# Patient Record
Sex: FEMALE | Race: BLACK OR AFRICAN AMERICAN | NOT HISPANIC OR LATINO | Employment: FULL TIME | URBAN - METROPOLITAN AREA
[De-identification: names, ages, dates, MRNs, and addresses within clinical notes are randomized per-mention and may not be internally consistent; named-entity substitution may affect disease eponyms.]

---

## 2018-03-27 ENCOUNTER — HISTORICAL (OUTPATIENT)
Dept: URGENT CARE | Facility: CLINIC | Age: 23
End: 2018-03-27

## 2018-03-27 LAB
BILIRUB SERPL-MCNC: NEGATIVE MG/DL
BLOOD URINE, POC: NEGATIVE
CLARITY, POC UA: NORMAL
COLOR, POC UA: YELLOW
GLUCOSE UR QL STRIP: NEGATIVE
KETONES UR QL STRIP: NEGATIVE
LEUKOCYTE EST, POC UA: NORMAL
NITRITE, POC UA: NEGATIVE
PH, POC UA: 7
PROTEIN, POC: NORMAL
SPECIFIC GRAVITY, POC UA: 1.01
UROBILINOGEN, POC UA: NORMAL

## 2019-04-29 LAB
INFLUENZA A ANTIGEN, POC: NEGATIVE
INFLUENZA B ANTIGEN, POC: NEGATIVE
RAPID GROUP A STREP (OHS): NEGATIVE

## 2019-10-11 ENCOUNTER — HISTORICAL (OUTPATIENT)
Dept: URGENT CARE | Facility: CLINIC | Age: 24
End: 2019-10-11

## 2019-10-11 LAB
ABS NEUT (OLG): 4.61 X10(3)/MCL (ref 2.1–9.2)
ALBUMIN SERPL-MCNC: 4.4 GM/DL (ref 3.4–5)
ALBUMIN/GLOB SERPL: 1.3 RATIO (ref 1.1–2)
ALP SERPL-CCNC: 56 UNIT/L (ref 38–126)
ALT SERPL-CCNC: 13 UNIT/L (ref 12–78)
AST SERPL-CCNC: 10 UNIT/L (ref 15–37)
BASOPHILS # BLD AUTO: 0 X10(3)/MCL (ref 0–0.2)
BASOPHILS NFR BLD AUTO: 0 %
BILIRUB SERPL-MCNC: 0.5 MG/DL (ref 0.2–1)
BILIRUBIN DIRECT+TOT PNL SERPL-MCNC: 0.1 MG/DL (ref 0–0.5)
BILIRUBIN DIRECT+TOT PNL SERPL-MCNC: 0.4 MG/DL (ref 0–0.8)
BUN SERPL-MCNC: 7 MG/DL (ref 7–18)
CALCIUM SERPL-MCNC: 9.3 MG/DL (ref 8.5–10.1)
CHLORIDE SERPL-SCNC: 107 MMOL/L (ref 98–107)
CO2 SERPL-SCNC: 27 MMOL/L (ref 21–32)
CREAT SERPL-MCNC: 0.81 MG/DL (ref 0.55–1.02)
EOSINOPHIL # BLD AUTO: 0 X10(3)/MCL (ref 0–0.9)
EOSINOPHIL NFR BLD AUTO: 1 %
ERYTHROCYTE [DISTWIDTH] IN BLOOD BY AUTOMATED COUNT: 12.3 % (ref 11.5–17)
GLOBULIN SER-MCNC: 3.3 GM/DL (ref 2.4–3.5)
GLUCOSE SERPL-MCNC: 82 MG/DL (ref 74–106)
HCT VFR BLD AUTO: 42.5 % (ref 37–47)
HGB BLD-MCNC: 13.3 GM/DL (ref 12–16)
INFLUENZA A ANTIGEN, POC: NEGATIVE
INFLUENZA B ANTIGEN, POC: NEGATIVE
LYMPHOCYTES # BLD AUTO: 2.1 X10(3)/MCL (ref 0.6–4.6)
LYMPHOCYTES NFR BLD AUTO: 28 %
MCH RBC QN AUTO: 29.4 PG (ref 27–31)
MCHC RBC AUTO-ENTMCNC: 31.3 GM/DL (ref 33–36)
MCV RBC AUTO: 94 FL (ref 80–94)
MONOCYTES # BLD AUTO: 0.7 X10(3)/MCL (ref 0.1–1.3)
MONOCYTES NFR BLD AUTO: 10 %
NEUTROPHILS # BLD AUTO: 4.61 X10(3)/MCL (ref 2.1–9.2)
NEUTROPHILS NFR BLD AUTO: 61 %
PLATELET # BLD AUTO: 231 X10(3)/MCL (ref 130–400)
PMV BLD AUTO: 11.9 FL (ref 9.4–12.4)
POTASSIUM SERPL-SCNC: 4 MMOL/L (ref 3.5–5.1)
PROT SERPL-MCNC: 7.7 GM/DL (ref 6.4–8.2)
RBC # BLD AUTO: 4.52 X10(6)/MCL (ref 4.2–5.4)
SODIUM SERPL-SCNC: 142 MMOL/L (ref 136–145)
TSH SERPL-ACNC: 1.5 MIU/L (ref 0.36–3.74)
WBC # SPEC AUTO: 7.6 X10(3)/MCL (ref 4.5–11.5)

## 2019-12-02 ENCOUNTER — HISTORICAL (OUTPATIENT)
Dept: ADMINISTRATIVE | Facility: HOSPITAL | Age: 24
End: 2019-12-02

## 2019-12-02 ENCOUNTER — HISTORICAL (OUTPATIENT)
Dept: URGENT CARE | Facility: CLINIC | Age: 24
End: 2019-12-02

## 2019-12-02 LAB
BILIRUB SERPL-MCNC: NEGATIVE MG/DL
BLOOD URINE, POC: NEGATIVE
CLARITY, POC UA: NORMAL
COLOR, POC UA: YELLOW
GLUCOSE UR QL STRIP: NEGATIVE
INFLUENZA A ANTIGEN, POC: NEGATIVE
INFLUENZA B ANTIGEN, POC: NEGATIVE
KETONES UR QL STRIP: NORMAL
LEUKOCYTE EST, POC UA: NEGATIVE
NITRITE, POC UA: POSITIVE
PH, POC UA: 6
PROTEIN, POC: NORMAL
SPECIFIC GRAVITY, POC UA: 1.02
UROBILINOGEN, POC UA: NORMAL

## 2020-03-29 ENCOUNTER — HISTORICAL (OUTPATIENT)
Dept: ADMINISTRATIVE | Facility: HOSPITAL | Age: 25
End: 2020-03-29

## 2020-03-29 LAB
BILIRUB SERPL-MCNC: NEGATIVE MG/DL
BLOOD URINE, POC: NEGATIVE
CLARITY, POC UA: CLEAR
COLOR, POC UA: YELLOW
GLUCOSE UR QL STRIP: NEGATIVE
KETONES UR QL STRIP: NORMAL
LEUKOCYTE EST, POC UA: NEGATIVE
NITRITE, POC UA: NEGATIVE
PH, POC UA: 6.5
PROTEIN, POC: NEGATIVE
SPECIFIC GRAVITY, POC UA: 1.02
UROBILINOGEN, POC UA: NORMAL

## 2020-05-08 ENCOUNTER — HISTORICAL (OUTPATIENT)
Dept: RADIOLOGY | Facility: HOSPITAL | Age: 25
End: 2020-05-08

## 2022-04-10 ENCOUNTER — HISTORICAL (OUTPATIENT)
Dept: ADMINISTRATIVE | Facility: HOSPITAL | Age: 27
End: 2022-04-10
Payer: COMMERCIAL

## 2022-04-27 VITALS
OXYGEN SATURATION: 100 % | DIASTOLIC BLOOD PRESSURE: 69 MMHG | SYSTOLIC BLOOD PRESSURE: 105 MMHG | BODY MASS INDEX: 22.22 KG/M2 | WEIGHT: 141.56 LBS | HEIGHT: 67 IN

## 2022-05-03 NOTE — HISTORICAL [facility]
This is a historical note converted from Jenny. Formatting and pictures may have been removed.  Please reference Jenny for original formatting and attached multimedia. Chief Complaint  mid right abdominal pain, nausea, vomiting for 2 days (reoccuring issue)  History of Present Illness  24-year-old female presents for concern of abdominal pain x 2 days, mainly R sided with associated nausea and vomiting.? Vomiting x 4 yesterday.? Came with pain and without the pain.? No fever, no radiation of the pain.? Described as a crampy and sharp sensation.? Comes and goes.? Sometimes feels it in the L flank.? Some darkening of the urine noted over the last couple of days. No hematuria.?  LMP 3/24 (5 days prior), no blood clots, denies possibility of pregnancy.  Review of Systems  Constitutional_negative for fever  ENMT_negative for rhinorrhea, sinus pressure, sore throat,?blurry vision or change in vision  Respiratory_negative for?cough  Gastrointestinal_negative for nausea or vomiting  Integumentary_negative for rash  Physical Exam  Vitals & Measurements  T:?36.7? ?C (Oral)? HR:?67(Peripheral)? RR:?17? BP:?144/79? SpO2:?100%?  HT:?170?cm? WT:?60?kg? BMI:?20.76? LMP:?03/24/2020 00:00 CDT?  Gen: WD NAD  CV: S1S2 RRR no MRG  Abd: ND, mild tenderness to deep palpation of epigastrium, no organomegaly, normoactive BS  Resp: CTA B  Extr: no CCE  Integument: warm, no rashes or lesions  Psych: Cooperative, approp mood and affect  Assessment/Plan  1.?Nausea?R11.0  ?Injection of Zofran given today with risks and benefits discussed, voiced understanding.?Force fluids, bland diet.  ?Force fluids, Gatorade, take Zofran for nausea, take 30 minutes before eating, monitor for improvement.? Avoid diarrhea suppression medications.  Ordered:  ketorolac, 10 mg = 1 tab(s), Oral, q6hr, PRN PRN for pain, take with food, X 5 day(s), # 20 tab(s), 0 Refill(s), Pharmacy: [facility] #[id number], 170, cm, Height/Length Dosing, 03/29/20 8:50:00 CDT,  60, kg, Weight Dosing, 03/29/20 8:50:00 CDT  ondansetron, 8 mg = 1 tab(s), Oral, BID, # 6 tab(s), 0 Refill(s), Pharmacy: [facility] #[id number], 170, cm, Height/Length Dosing, 03/29/20 8:50:00 CDT, 60, kg, Weight Dosing, 03/29/20 8:50:00 CDT  XR Abdomen Flat and Erect, Routine, 03/29/20 9:19:00 CDT, Abdominal Pain, RLQ and B flank pain, None, Ambulatory, Rad Type, Nausea  RLQ abdominal pain  Flank pain, Not Scheduled, 03/29/20 9:19:00 CDT  ?  2.?RLQ abdominal pain?R10.31  ?X ray of the abdomen done today, per my review no stones seen. Notable for trapped gas.  Will also call with final report.  ER precautions for worsening symptoms.  ?  Discussed CT if symptoms not improving. Will plan to schedule since patient does not have primary MD.  This pain has been reoccurring, severe but stable today.? Rule out nephrolithiasis.  Ordered:  ketorolac, 10 mg = 1 tab(s), Oral, q6hr, PRN PRN for pain, take with food, X 5 day(s), # 20 tab(s), 0 Refill(s), Pharmacy: [facility] #[id number], 170, cm, Height/Length Dosing, 03/29/20 8:50:00 CDT, 60, kg, Weight Dosing, 03/29/20 8:50:00 CDT  ondansetron, 8 mg = 1 tab(s), Oral, BID, # 6 tab(s), 0 Refill(s), Pharmacy: [facility] #[id number], 170, cm, Height/Length Dosing, 03/29/20 8:50:00 CDT, 60, kg, Weight Dosing, 03/29/20 8:50:00 CDT  XR Abdomen Flat and Erect, Routine, 03/29/20 9:19:00 CDT, Abdominal Pain, RLQ and B flank pain, None, Ambulatory, Rad Type, Nausea  RLQ abdominal pain  Flank pain, Not Scheduled, 03/29/20 9:19:00 CDT  ?  3.?Flank pain?R10.9  ?Urine dip with ketones otherwise clear today.  Ordered:  ketorolac, 10 mg = 1 tab(s), Oral, q6hr, PRN PRN for pain, take with food, X 5 day(s), # 20 tab(s), 0 Refill(s), Pharmacy: [facility] #[id number], 170, cm, Height/Length Dosing, 03/29/20 8:50:00 CDT, 60, kg, Weight Dosing, 03/29/20 8:50:00 CDT  ondansetron, 8 mg = 1 tab(s), Oral, BID, # 6 tab(s), 0 Refill(s), Pharmacy: [facility] #[id number], 170,  cm, Height/Length Dosing, 03/29/20 8:50:00 CDT, 60, kg, Weight Dosing, 03/29/20 8:50:00 CDT  XR Abdomen Flat and Erect, Routine, 03/29/20 9:19:00 CDT, Abdominal Pain, RLQ and B flank pain, None, Ambulatory, Rad Type, Nausea  RLQ abdominal pain  Flank pain, Not Scheduled, 03/29/20 9:19:00 CDT  ?   Problem List/Past Medical History  Ongoing  No chronic problems  Historical  No qualifying data  Procedure/Surgical History  Removal of pilonidal cyst (2010)   Medications  No active medications  Allergies  amoxicillin?(Rash)  Social History  Abuse/Neglect  No, 03/29/2020  Alcohol  Never, 11/11/2017  Substance Use  Never, 04/29/2019  Tobacco - Denies Tobacco Use, 11/18/2015  Never (less than 100 in lifetime), N/A, 03/29/2020  Family History  Family history is negative  Immunizations  Vaccine Date Status   meningococcal conjugate vaccine 06/22/2018 Given   Health Maintenance  Health Maintenance  ???Pending?(in the next year)  ??? ??OverDue  ??? ? ? ?Alcohol Misuse Screening due??01/01/20??and every 1??year(s)  ??? ??Due?  ??? ? ? ?ADL Screening due??03/29/20??and every 1??year(s)  ??? ? ? ?Depression Screening due??03/29/20??and every?  ??? ? ? ?Tetanus Vaccine due??03/29/20??and every 10??year(s)  ??? ??Due In Future?  ??? ? ? ?Obesity Screening not due until??01/01/21??and every 1??year(s)  ???Satisfied?(in the past 1 year)  ??? ??Satisfied?  ??? ? ? ?Blood Pressure Screening on??03/29/20.??Satisfied by Joselo Leon  ??? ? ? ?Body Mass Index Check on??03/29/20.??Satisfied by Joselo Leon  ??? ? ? ?Cervical Cancer Screening on??07/01/19.??Satisfied by Luca, Rosalie  ??? ? ? ?Influenza Vaccine on??12/02/19.??Satisfied by Joselo Leon  ??? ? ? ?Obesity Screening on??03/29/20.??Satisfied by Joselo Leon  ?  Diagnostic Results  Yellow  Clear  6.5  1.020  Negative  Negative  2+  Negative  Negative  0.2 mg/dl  Negative  Negative  ?      denies pain/discomfort

## 2022-05-03 NOTE — HISTORICAL [facility]
This is a historical note converted from Jenny. Formatting and pictures may have been removed.  Please reference Jenny for original formatting and attached multimedia. Chief Complaint  cough, chest tightness, vomiting, diarrhea, upset, chills, achy for 6 days exposed to flu  History of Present Illness  23 yo F exposed to flu presents for about 5-6 days of NVD, chest congestion, chills, and general GI upset, tolerating PO, no vomiting in clinic, no measured fever.? Loose BM less than 5 a day.? Vomiting rare. No focal abd pain reported.?Denies?any sexual activity with a man.? Has history of ovarian cyst?many years ago.? LMP about 10 days ago.  Review of Systems  Constitutional_negative for measured fever, positive subjective fever  ENMT_+rhinorrhea, + sinus pressure frontal headache, + sore throat, no blurry vision or change in vision  Respiratory_+ cough  Gastrointestinal_pos for nausea and vomiting  Integumentary_negative for rash  Physical Exam  Vitals & Measurements  T:?36.8? ?C (Oral)? HR:?62(Peripheral)? RR:?17? BP:?120/81? SpO2:?100%?  HT:?170?cm? WT:?63?kg? BMI:?21.8? LMP:?11/21/2019 00:00 CST?  Gen: WD NAD  CV: S1S2 RRR no MRG  Resp: CTA B no RRW,? full excursions  HEENT: clear rhinorrhea, no cervical MARINA, +PND, TMs without bulging or erythema bilaterally, no posterior pharyngeal erythema?  Extr: no CCE  Integument: warm, no rashes to abdomen  GI: + TTP of RLQ, + pain with R hip flexion, no flank TTP  Psych: Cooperative, approp mood and affect  Assessment/Plan  1.?Cough?R05  ?flu test negative  Ordered:  nitrofurantoin, 100 mg = 1 cap(s), Oral, BID, X 7 day(s), # 14 cap(s), 0 Refill(s), Pharmacy: [facility] #[id number]  phenazopyridine, 100 mg = 1 tab(s), Oral, TID, X 3 day(s), # 9 tab(s), 0 Refill(s), Pharmacy: [facility] #[id number]  Urine Culture [id number], Routine collect, 12/02/19 13:28:00 CST, Order for future visit, Urine, Nurse collect, Stop date 12/02/19 13:28:00 CST, Cough  RLQ abdominal  pain  Acute cystitis  XR Abdomen Flat and Erect, Routine, 12/02/19 13:13:00 CST, Abdominal Pain, RLQ pain, None, Ambulatory, Rad Type, Cough  RLQ abdominal pain, Not Scheduled, 12/02/19 13:13:00 CST  ?  2.?RLQ abdominal pain?R10.31  ?Denies possibility of pregnancy.  Urine dip concerning for infection.  X ray of the abdomen with some gas otherwise no concerns.  Ordered:  nitrofurantoin, 100 mg = 1 cap(s), Oral, BID, X 7 day(s), # 14 cap(s), 0 Refill(s), Pharmacy: [facility] #[id number]  phenazopyridine, 100 mg = 1 tab(s), Oral, TID, X 3 day(s), # 9 tab(s), 0 Refill(s), Pharmacy: [facility] #[id number]  Urine Culture [id number], Routine collect, 12/02/19 13:28:00 CST, Order for future visit, Urine, Nurse collect, Stop date 12/02/19 13:28:00 CST, Cough  RLQ abdominal pain  Acute cystitis  XR Abdomen Flat and Erect, Routine, 12/02/19 13:13:00 CST, Abdominal Pain, RLQ pain, None, Ambulatory, Rad Type, Cough  RLQ abdominal pain, Not Scheduled, 12/02/19 13:13:00 CST  ?  3.?Acute cystitis?N30.00  ?Recommend taking Macrobid as directed, may use Pyridium for discomfort,?will send urine for culture, should get urine culture results?in 3 days.? Increase fluid intake.?  Also take gas reducer like Gas X, warm compress or warm bath.  ER for continued or worsening symptoms.  Differential includes ovarian cyst and appendicitis.  Ordered:  nitrofurantoin, 100 mg = 1 cap(s), Oral, BID, X 7 day(s), # 14 cap(s), 0 Refill(s), Pharmacy: [facility] #[id number]  phenazopyridine, 100 mg = 1 tab(s), Oral, TID, X 3 day(s), # 9 tab(s), 0 Refill(s), Pharmacy: [facility] #[id number]  Urine Culture [id number], Routine collect, 12/02/19 13:28:00 CST, Order for future visit, Urine, Nurse collect, Stop date 12/02/19 13:28:00 CST, Cough  RLQ abdominal pain  Acute cystitis  ?   Problem List/Past Medical History  Ongoing  No chronic problems  Historical  No qualifying data  Procedure/Surgical History  Removal of pilonidal cyst (2010)    Medications  No active medications  Allergies  amoxicillin?(Rash)  Social History  Abuse/Neglect  No, 12/02/2019  Alcohol  Never, 11/11/2017  Substance Use  Never, 04/29/2019  Tobacco - Denies Tobacco Use, 11/18/2015  Never (less than 100 in lifetime), N/A, 12/02/2019  Family History  Family history is negative  Immunizations  Vaccine Date Status   meningococcal conjugate vaccine 06/22/2018 Given   Health Maintenance  Health Maintenance  ???Pending?(in the next year)  ??? ??OverDue  ??? ? ? ?Alcohol Misuse Screening due??01/01/19??and every 1??year(s)  ??? ??Due?  ??? ? ? ?ADL Screening due??12/02/19??and every 1??year(s)  ??? ? ? ?Depression Screening due??12/02/19??and every?  ??? ? ? ?Influenza Vaccine due??12/02/19??and every?  ??? ? ? ?Tetanus Vaccine due??12/02/19??and every 10??year(s)  ??? ??Due In Future?  ??? ? ? ?Obesity Screening not due until??01/01/20??and every 1??year(s)  ??? ? ? ?Blood Pressure Screening not due until??12/01/20??and every 1??year(s)  ??? ? ? ?Body Mass Index Check not due until??12/01/20??and every 1??year(s)  ???Satisfied?(in the past 1 year)  ??? ??Satisfied?  ??? ? ? ?Blood Pressure Screening on??12/02/19.??Satisfied by Radha RT Joselo A.  ??? ? ? ?Body Mass Index Check on??12/02/19.??Satisfied by Radha RT Joselo A.  ??? ? ? ?Cervical Cancer Screening on??07/01/19.??Satisfied by Rosalie Segovia  ??? ? ? ?Influenza Vaccine on??12/02/19.??Satisfied by Radha RT Joselo A.  ??? ? ? ?Obesity Screening on??12/02/19.??Satisfied by Joselo Leon  ?  Diagnostic Results  Yellow  Cloudy  6  1.020  Negative  Negative  2+  1+ (30 mg/dl)  Negative  0.2 mg/dl  Negative  Positive  ?

## 2022-06-07 ENCOUNTER — OFFICE VISIT (OUTPATIENT)
Dept: URGENT CARE | Facility: CLINIC | Age: 27
End: 2022-06-07
Payer: COMMERCIAL

## 2022-06-07 VITALS
DIASTOLIC BLOOD PRESSURE: 64 MMHG | SYSTOLIC BLOOD PRESSURE: 101 MMHG | RESPIRATION RATE: 18 BRPM | WEIGHT: 130 LBS | OXYGEN SATURATION: 100 % | HEART RATE: 113 BPM | HEIGHT: 66 IN | BODY MASS INDEX: 20.89 KG/M2 | TEMPERATURE: 101 F

## 2022-06-07 DIAGNOSIS — R52 BODY ACHES: ICD-10-CM

## 2022-06-07 DIAGNOSIS — R51.9 ACUTE NONINTRACTABLE HEADACHE, UNSPECIFIED HEADACHE TYPE: ICD-10-CM

## 2022-06-07 DIAGNOSIS — R05.9 COUGH: ICD-10-CM

## 2022-06-07 DIAGNOSIS — U07.1 COVID-19: Primary | ICD-10-CM

## 2022-06-07 LAB
CTP QC/QA: YES
CTP QC/QA: YES
FLUAV AG NPH QL: NEGATIVE
FLUBV AG NPH QL: NEGATIVE
SARS-COV-2 RDRP RESP QL NAA+PROBE: POSITIVE

## 2022-06-07 PROCEDURE — U0002: ICD-10-PCS | Mod: QW,,, | Performed by: FAMILY MEDICINE

## 2022-06-07 PROCEDURE — 87804 POCT INFLUENZA A/B: ICD-10-PCS | Mod: QW,,, | Performed by: FAMILY MEDICINE

## 2022-06-07 PROCEDURE — 99213 PR OFFICE/OUTPT VISIT, EST, LEVL III, 20-29 MIN: ICD-10-PCS | Mod: 25,,, | Performed by: FAMILY MEDICINE

## 2022-06-07 PROCEDURE — 87804 INFLUENZA ASSAY W/OPTIC: CPT | Mod: QW,,, | Performed by: FAMILY MEDICINE

## 2022-06-07 PROCEDURE — U0002 COVID-19 LAB TEST NON-CDC: HCPCS | Mod: QW,,, | Performed by: FAMILY MEDICINE

## 2022-06-07 PROCEDURE — 99213 OFFICE O/P EST LOW 20 MIN: CPT | Mod: 25,,, | Performed by: FAMILY MEDICINE

## 2022-06-07 RX ORDER — COVID-19 MOLECULAR TEST ASSAY
KIT MISCELLANEOUS
COMMUNITY
Start: 2022-04-19 | End: 2022-07-19

## 2022-06-07 NOTE — PROGRESS NOTES
"Subjective:       Patient ID: Ellis Ramos is a 26 y.o. female.    Vitals:  height is 5' 6" (1.676 m) and weight is 59 kg (130 lb). Her temperature is 100.6 °F (38.1 °C) (abnormal). Her blood pressure is 101/64 and her pulse is 113 (abnormal). Her respiration is 18 and oxygen saturation is 100%.     Chief Complaint: Chills, Cough, Dizziness, Headache (Started yesterday, was exposed to covid  on 06/04. ), and Generalized Body Aches    HPI   Patient is seen and evaluated in an limited status for concern for COVID-19. In order to decrease the risk of exposure to the hospital and health care workers, only a limited exam was done.     Risk factors include :  26-year-old female works for [employer], not vaccinated for COVID-19 present to clinic with concerns of feeling feverish, chills, headache and congestion since yesterday.  Exposure to COVID-19 2 days ago.  Complains of body aches.  Temp in the clinic 100.6.  Otherwise healthy does not take any prescription medications.      Provider Precautions  N95 mask  Gloves  Eye protection- Face Shield    Covid Screening  +  confirmation close contact  No travel to high risk area  No recent testing done    Review of Systems  Constitutional _fever, body aches, headache  ENMT_sore throat, nasal congestion and postnasal drip  Respiratory_coughing, no shortness of breath or wheezing  Cardiovascular_no chest pain, no palpitations  Gastrointestinal_negative for nausea or vomiting  Integumentary_negative for rash    Objective:      Physical Exam    General : Alert and Oriented, No apparent distress, febrile, sounds stuffy and congested  Neck - supple  HENT : Oropharynx no redness or swelling.    Respiratory : Bilateral equal breath sounds, nonlabored respirations  Cardiovascular : Rate, rhythm regular, normal volume pulse, no murmur  Integumentary : Warm, Dry and no rash  Assessment:       1. COVID-19    2. Cough    3. Body aches          Plan:   With Concern for COVID-19 infection. " Swabs obtained today. COVID-19 Test positive  Adequate hydration and rest. Monitor the symptoms closely  Signs and symptoms that should prompt reevaluation discussed as well  Recommendation is to follow a timeline quarantine measures per CDC and LDH  Tylenol as needed for fever, body aches and headache. Antihistamine of choice for congestion.   Robitussin-DM for cough and cold as needed and as directed. Can try vitamin C, zinc 50 mg, vitamin D3 5000 IU for 10 days.  At home quarantine instructions for 10 days since start of symptoms, no fever (100.4 and above) for 24 hours and with improved symptoms can stop home quarantine  Self-care and home quarantine instructions like- Wear a mask, cover your cough and sneeze. Clean any shared surfaces  Call or return to clinic for any questions. ER precautions with any acute change in symptoms. Follow up with primary MD    COVID-19    Cough  -     POCT COVID-19 Rapid Screening    Body aches  -     POCT Influenza A/B    Flu test negative, COVID-19 test negative.  Copy of test results for work  Work excuse for 1 week

## 2022-06-08 ENCOUNTER — TELEPHONE (OUTPATIENT)
Dept: URGENT CARE | Facility: CLINIC | Age: 27
End: 2022-06-08
Payer: COMMERCIAL

## 2022-06-08 RX ORDER — BUTALBITAL, ACETAMINOPHEN AND CAFFEINE 50; 325; 40 MG/1; MG/1; MG/1
1 TABLET ORAL EVERY 6 HOURS PRN
Qty: 20 TABLET | Refills: 0 | Status: SHIPPED | OUTPATIENT
Start: 2022-06-08 | End: 2022-06-13

## 2022-06-08 NOTE — TELEPHONE ENCOUNTER
----- Message from Violeta Daugherty MD sent at 6/8/2022 12:01 PM CDT -----  Regarding: RE: pt condition  Force fluids, rest, Tylenol.  Can also send in Fioricet for headache if desires.   ----- Message -----  From: Yuki Lewis LPN  Sent: 6/8/2022  11:13 AM CDT  To: [facility] Results  Subject: pt condition                                       ----- Message -----  From: Svetlana Faith  Sent: 6/8/2022   9:32 AM CDT  To: [facility] Clinical Support Staff    Jana visited our clinic on 06/07/2022. She tested positive for Covid. She called and stated that her body aches and headaches are severe. She was wondering if she can take anything else for her symptoms. Please advise

## 2022-06-09 RX ORDER — ONDANSETRON 8 MG/1
8 TABLET, ORALLY DISINTEGRATING ORAL EVERY 6 HOURS PRN
Qty: 12 TABLET | Refills: 0 | Status: SHIPPED | OUTPATIENT
Start: 2022-06-09 | End: 2022-07-19

## 2022-07-19 ENCOUNTER — OFFICE VISIT (OUTPATIENT)
Dept: GYNECOLOGY | Facility: CLINIC | Age: 27
End: 2022-07-19
Payer: COMMERCIAL

## 2022-07-19 VITALS
OXYGEN SATURATION: 100 % | DIASTOLIC BLOOD PRESSURE: 66 MMHG | HEART RATE: 63 BPM | HEIGHT: 66 IN | SYSTOLIC BLOOD PRESSURE: 99 MMHG | BODY MASS INDEX: 21.53 KG/M2 | WEIGHT: 134 LBS | RESPIRATION RATE: 18 BRPM | TEMPERATURE: 98 F

## 2022-07-19 DIAGNOSIS — N94.6 DYSMENORRHEA: ICD-10-CM

## 2022-07-19 DIAGNOSIS — Z12.4 PAP SMEAR FOR CERVICAL CANCER SCREENING: Primary | ICD-10-CM

## 2022-07-19 DIAGNOSIS — Z11.3 ROUTINE SCREENING FOR STI (SEXUALLY TRANSMITTED INFECTION): ICD-10-CM

## 2022-07-19 LAB
B-HCG UR QL: NEGATIVE
CTP QC/QA: YES

## 2022-07-19 PROCEDURE — 87591 N.GONORRHOEAE DNA AMP PROB: CPT | Performed by: NURSE PRACTITIONER

## 2022-07-19 PROCEDURE — 81025 URINE PREGNANCY TEST: CPT | Mod: PBBFAC | Performed by: NURSE PRACTITIONER

## 2022-07-19 PROCEDURE — 99395 PREV VISIT EST AGE 18-39: CPT | Mod: S$PBB,,, | Performed by: NURSE PRACTITIONER

## 2022-07-19 PROCEDURE — 87491 CHLMYD TRACH DNA AMP PROBE: CPT | Performed by: NURSE PRACTITIONER

## 2022-07-19 PROCEDURE — 99214 OFFICE O/P EST MOD 30 MIN: CPT | Mod: PBBFAC | Performed by: NURSE PRACTITIONER

## 2022-07-19 PROCEDURE — 99395 PR PREVENTIVE VISIT,EST,18-39: ICD-10-PCS | Mod: S$PBB,,, | Performed by: NURSE PRACTITIONER

## 2022-07-26 LAB
CHLAMYDIA TRACHOMATIS (PRECISION): NEGATIVE
INSULIN SERPL-ACNC: NORMAL U[IU]/ML
LAB AP BETHESDA CATEGORY: NORMAL
LAB AP CLINICAL FINDINGS: NORMAL
LAB AP CONTRACEPTIVES: NORMAL
LAB AP GYN MOLECULAR TESTING: NORMAL
LAB AP LMP DATE: NORMAL
LAB AP OCHS PAP SPECIMEN ADEQUACY: NORMAL
LAB AP OHS PAP INTERPRETATION: NORMAL
LAB AP PAP DISCLAIMER COMMENTS: NORMAL
LAB AP PAP ESTROGEN REPLACEMENT THERAPY: NORMAL
LAB AP PAP PMP: NORMAL
LAB AP PAP PREVIOUS BX: NORMAL
LAB AP PAP PRIOR TREATMENT: NORMAL
NEISSERIA GONORRHOEAE (PRECISION): NEGATIVE

## 2022-07-28 ENCOUNTER — HOSPITAL ENCOUNTER (OUTPATIENT)
Dept: RADIOLOGY | Facility: HOSPITAL | Age: 27
Discharge: HOME | End: 2022-07-28
Attending: NURSE PRACTITIONER
Payer: COMMERCIAL

## 2022-07-28 DIAGNOSIS — N94.6 DYSMENORRHEA: ICD-10-CM

## 2022-07-28 PROCEDURE — 76830 TRANSVAGINAL US NON-OB: CPT | Mod: TC

## 2022-07-29 ENCOUNTER — TELEPHONE (OUTPATIENT)
Dept: GYNECOLOGY | Facility: CLINIC | Age: 27
End: 2022-07-29
Payer: COMMERCIAL

## 2022-07-29 DIAGNOSIS — N94.6 DYSMENORRHEA: Primary | ICD-10-CM

## 2022-08-18 ENCOUNTER — HOSPITAL ENCOUNTER (OUTPATIENT)
Dept: RADIOLOGY | Facility: HOSPITAL | Age: 27
Discharge: HOME | End: 2022-08-18
Attending: NURSE PRACTITIONER
Payer: COMMERCIAL

## 2022-08-18 DIAGNOSIS — N94.6 DYSMENORRHEA: ICD-10-CM

## 2022-08-18 PROCEDURE — 76830 TRANSVAGINAL US NON-OB: CPT | Mod: TC

## 2022-08-19 ENCOUNTER — TELEPHONE (OUTPATIENT)
Dept: GYNECOLOGY | Facility: CLINIC | Age: 27
End: 2022-08-19
Payer: COMMERCIAL

## 2022-08-19 NOTE — TELEPHONE ENCOUNTER
----- Message from Amanda Sauceda sent at 8/19/2022 12:12 PM CDT -----  Regarding: Patient call  Contact: ARIANA OBRIEN  Patient is calling and would like the results of the US that was done. I am not sure if you ordered the US or not . Patient stated that she wanted an emergency appointment because she saw her results in the patient portal and saw that it stated she has cyst.   Please advise

## 2022-08-19 NOTE — TELEPHONE ENCOUNTER
Reviewed pelvic uls with pt. Pt was to have f/u in 6 weeks,however was performed in 10 days. Pt is scheduled with GYN on 9/19/22. Admits some intermittent pelvic pain on Rt side, encouraged Ibuprofen as needed.

## 2022-08-20 ENCOUNTER — HOSPITAL ENCOUNTER (EMERGENCY)
Facility: HOSPITAL | Age: 27
Discharge: HOME | End: 2022-08-20
Attending: FAMILY MEDICINE
Payer: COMMERCIAL

## 2022-08-20 VITALS
DIASTOLIC BLOOD PRESSURE: 69 MMHG | BODY MASS INDEX: 22.15 KG/M2 | HEART RATE: 67 BPM | SYSTOLIC BLOOD PRESSURE: 109 MMHG | TEMPERATURE: 97 F | HEIGHT: 67 IN | OXYGEN SATURATION: 98 % | WEIGHT: 141.13 LBS | RESPIRATION RATE: 20 BRPM

## 2022-08-20 DIAGNOSIS — N94.6 DYSMENORRHEA: Primary | ICD-10-CM

## 2022-08-20 LAB
B-HCG UR QL: NEGATIVE
CTP QC/QA: YES

## 2022-08-20 PROCEDURE — 81025 URINE PREGNANCY TEST: CPT | Performed by: FAMILY MEDICINE

## 2022-08-20 PROCEDURE — 99284 EMERGENCY DEPT VISIT MOD MDM: CPT | Mod: 25

## 2022-08-20 PROCEDURE — 63600175 PHARM REV CODE 636 W HCPCS: Performed by: FAMILY MEDICINE

## 2022-08-20 PROCEDURE — 96372 THER/PROPH/DIAG INJ SC/IM: CPT | Performed by: FAMILY MEDICINE

## 2022-08-20 RX ORDER — KETOROLAC TROMETHAMINE 10 MG/1
10 TABLET, FILM COATED ORAL EVERY 6 HOURS PRN
Qty: 20 TABLET | Refills: 0 | Status: SHIPPED | OUTPATIENT
Start: 2022-08-20 | End: 2022-08-25

## 2022-08-20 RX ORDER — KETOROLAC TROMETHAMINE 30 MG/ML
60 INJECTION, SOLUTION INTRAMUSCULAR; INTRAVENOUS
Status: COMPLETED | OUTPATIENT
Start: 2022-08-20 | End: 2022-08-20

## 2022-08-20 RX ADMIN — KETOROLAC TROMETHAMINE 60 MG: 30 INJECTION, SOLUTION INTRAMUSCULAR at 02:08

## 2022-08-20 NOTE — ED PROVIDER NOTES
Encounter Date: 8/20/2022       History     Chief Complaint   Patient presents with    Abdominal Pain     Abd pain due to menstruation     26-year-old female presents emergency room with complaints dysmenorrhea.  Symptoms began yesterday with a  menstrual cycle.  Patient denies fever chills.  Denies nausea vomiting.  Reports severe lower abdominal cramping.  Patient use Tylenol at home without significant improvement.  Patient reports she has taking Toradol before in the past with good symptom relief.  Symptoms are moderate, nothing makes better or worse, describes as cramping.        Review of patient's allergies indicates:   Allergen Reactions    Amoxicillin Dermatitis     Other reaction(s): Rash     Past Medical History:   Diagnosis Date    Abnormal Pap smear of cervix      Past Surgical History:   Procedure Laterality Date    CYST REMOVAL      On right foot     Family History   Problem Relation Age of Onset    Diabetes Maternal Aunt      Social History     Tobacco Use    Smoking status: Never Smoker    Smokeless tobacco: Never Used   Substance Use Topics    Alcohol use: Yes     Comment: Occasionally    Drug use: Never     Review of Systems   Constitutional: Negative for chills, fatigue and fever.   HENT: Negative for ear pain, rhinorrhea and sore throat.    Eyes: Negative for photophobia and pain.   Respiratory: Negative for cough, shortness of breath and wheezing.    Cardiovascular: Negative for chest pain.   Gastrointestinal: Negative for abdominal pain, diarrhea, nausea and vomiting.   Genitourinary: Positive for pelvic pain and vaginal bleeding. Negative for dysuria.   Neurological: Negative for dizziness, weakness and headaches.   All other systems reviewed and are negative.      Physical Exam     Initial Vitals [08/20/22 0200]   BP Pulse Resp Temp SpO2   109/69 67 20 96.8 °F (36 °C) 98 %      MAP       --         Physical Exam    Nursing note and vitals reviewed.  Constitutional: She appears  well-developed and well-nourished. No distress.   HENT:   Head: Normocephalic and atraumatic.   Eyes: Conjunctivae and EOM are normal. Pupils are equal, round, and reactive to light.   Neck: Neck supple.   Normal range of motion.  Cardiovascular: Normal rate, regular rhythm and normal heart sounds.   Pulmonary/Chest: No respiratory distress. She has no wheezes. She has no rhonchi. She has no rales.   Abdominal: Abdomen is soft. Bowel sounds are normal. She exhibits no distension. There is no abdominal tenderness. There is no rebound and no guarding.   Musculoskeletal:         General: Normal range of motion.      Cervical back: Normal range of motion and neck supple.     Neurological: She is alert and oriented to person, place, and time.   Skin: Skin is warm and dry. Capillary refill takes less than 2 seconds. No erythema.   Psychiatric: She has a normal mood and affect. Her behavior is normal. Judgment and thought content normal.         ED Course   Procedures  Labs Reviewed   POCT URINE PREGNANCY          Imaging Results    None          Medications   ketorolac injection 60 mg (has no administration in time range)     Medical Decision Making:   Initial Assessment:   Patient currently no acute distress.  Will give a dose of Toradol here in the emergency room, and will give her 5 days worth of Toradol to use as needed for pain.  Patient has gyn follow-up.  ER precautions given for any acute worsening.                      Clinical Impression:   Final diagnoses:  [N94.6] Dysmenorrhea (Primary)          ED Disposition Condition    Discharge Stable        ED Prescriptions     Medication Sig Dispense Start Date End Date Auth. Provider    ketorolac (TORADOL) 10 mg tablet Take 1 tablet (10 mg total) by mouth every 6 (six) hours as needed for Pain. 20 tablet 8/20/2022 8/25/2022 Barry Pete MD        Follow-up Information     Follow up With Specialties Details Why Contact Info    [facility]  Dept Emergency Medicine  As needed, If symptoms worsen [address] [facility] [phone]    Lida Hoyos NP [facility], Emergency Medicine   [address] [place] [id number]  [phone]      Primary Care Physician  In 5 days             Barry Pete MD  08/20/22 0237       Barry Pete MD  08/20/22 0238

## 2022-08-25 ENCOUNTER — TELEPHONE (OUTPATIENT)
Dept: GYNECOLOGY | Facility: CLINIC | Age: 27
End: 2022-08-25
Payer: COMMERCIAL

## 2022-08-25 NOTE — TELEPHONE ENCOUNTER
----- Message from JENNA King sent at 8/25/2022  1:05 PM CDT -----  Regarding: FW: referral  I would not be able to send it, I have not seen the bunion to be able to refer her. I would suggest pt f/u with her PCP regarding the bunion and they will decide on the need for the referral.  ----- Message -----  From: Adia Perdue  Sent: 8/25/2022  10:29 AM CDT  To: [facility] Staff  Subject: referral                                         Above pt called asking if  can send a referral to ortho for a bunion she has. I told her she needs to contact her pcp but she stated she hasn't seen them in awhile and Heather was the last provider she seen and wondered if she could sent it. Please Advise Thanks

## 2022-08-30 ENCOUNTER — TELEPHONE (OUTPATIENT)
Dept: GYNECOLOGY | Facility: CLINIC | Age: 27
End: 2022-08-30
Payer: COMMERCIAL

## 2022-09-17 ENCOUNTER — HISTORICAL (OUTPATIENT)
Dept: ADMINISTRATIVE | Facility: HOSPITAL | Age: 27
End: 2022-09-17
Payer: COMMERCIAL

## 2022-09-19 ENCOUNTER — OFFICE VISIT (OUTPATIENT)
Dept: GYNECOLOGY | Facility: CLINIC | Age: 27
End: 2022-09-19
Payer: COMMERCIAL

## 2022-09-19 VITALS
TEMPERATURE: 99 F | HEART RATE: 76 BPM | RESPIRATION RATE: 20 BRPM | BODY MASS INDEX: 22.46 KG/M2 | WEIGHT: 139.75 LBS | DIASTOLIC BLOOD PRESSURE: 69 MMHG | OXYGEN SATURATION: 100 % | SYSTOLIC BLOOD PRESSURE: 106 MMHG | HEIGHT: 66 IN

## 2022-09-19 DIAGNOSIS — N94.6 DYSMENORRHEA: ICD-10-CM

## 2022-09-19 PROCEDURE — 99214 OFFICE O/P EST MOD 30 MIN: CPT | Mod: PBBFAC

## 2022-09-19 RX ORDER — LEVONORGESTREL / ETHINYL ESTRADIOL AND ETHINYL ESTRADIOL 150-30(84)
1 KIT ORAL DAILY
Qty: 1 EACH | Refills: 3 | Status: SHIPPED | OUTPATIENT
Start: 2022-09-19 | End: 2023-03-08

## 2022-09-19 NOTE — PROGRESS NOTES
"  [facility] GYNECOLOGY CLINIC NOTE     Ellis Ramos is a 26 y.o.  presenting to GYN clinic for dysmenorrhea.   Pt reports continued pain with menses. She used Ibuprofen and heating pad in past with little relief. Pain fluctuates throughout menses. Previously on OCPs with no improvement. Last OCP use unknown number of years ago. Previously followed with Dr. Poe. No recent appts; only ED visits due to pain according to patient. Pain mostly in Rt lower quadrant radiating to back. No dyspareunia. No known family hx of endometriosis.     Gynecology  LMP: 23  Menses: onset 12 y/o, irregular, lasting 6 days, occasionally bleeding 2x /month, 5-6 pads daily with quarter size clots   Pap smear: 22 NIL HPV Neg  BC: none years since last BC  STD: denies. Last GC neg     OB History          0    Para   0    Term   0       0    AB   0    Living   0         SAB   0    IAB   0    Ectopic   0    Multiple   0    Live Births   0                Past Medical History:   Diagnosis Date    Abnormal Pap smear of cervix       Past Surgical History:   Procedure Laterality Date    CYST REMOVAL      On right foot      No current outpatient medications  Social History     Tobacco Use    Smoking status: Never    Smokeless tobacco: Never   Substance Use Topics    Alcohol use: Yes     Comment: Occasionally    Drug use: Never       Review of Systems  Pertinent items are noted in HPI.     Objective:     /69 (BP Location: Right arm, Patient Position: Sitting, BP Method: Large (Automatic))   Pulse 76   Temp 98.7 °F (37.1 °C) (Oral)   Resp 20   Ht 5' 6" (1.676 m)   Wt 63.4 kg (139 lb 12.4 oz)   SpO2 100%   BMI 22.56 kg/m²   Physical Exam:  Gen: Well-nourished, well-developed female appearing stated age. Alert, cooperative, in no acute distress.  Chest: ctabl, no wheezes/rales/rhonchi  Abdomen: Soft, non-tender, no masses  Extrem: Extremities normal, atraumatic, non-tender calves.      Relevant Imaging:  US " Pelvis Comp with Transvag NON-OB (xpd  Narrative: EXAMINATION:  US PELVIS COMP WITH TRANSVAG NON-OB (XPD  CLINICAL HISTORY:  dysmenorrhea, repeat per recommendations.; Dysmenorrhea, unspecified  Size: 8.3 x 3.8 x 4 cm  Masses: There is heterogenicity of the uterine parenchyma  Endometrium: Endometrial stripe measures approximately 9 mm with some ill-defined areas and with some areas of increased vascularity.  Right ovary:Size: 3.7 x 2.6 x 2.5 cm cm  Appearance: 18 wall cyst identified measures 2.6 x 1.7 x 1.8 cm  Vascular flow: Normal.  Left ovary:Size: 3.2 x 2.2 x 2.7 cm  Appearance: With a small cystic structure adjacent to the ovary that measures 9 mm x 8 mm x 1.1 cm  Vascular Flow: Normal.    None.  Impression: Endometrial stripe at 9 mm slightly ill-defined with some areas of increased vascularity. Thick wall cyst in the right ovary with measurements as above. Paraovarian cyst on the left with measurements as above some areas with slight increased vascularity    Electronically signed by: Girogio Amezquita  Date:    2022  Time:    13:11        Assessment:       26 y.o.  here for dysmenorrhea.     Plan:     Problem List Items Addressed This Visit          Renal/    Dysmenorrhea     U/S results reviewed with pt and mother  Seasonique prescribed today  Instructed to take Ibuprofen 2-3 days prior to menses    Return to clinic 5 months      Pam Johnson M.D.  [facility] -2

## 2022-11-22 ENCOUNTER — HOSPITAL ENCOUNTER (EMERGENCY)
Facility: HOSPITAL | Age: 27
Discharge: HOME | End: 2022-11-22
Attending: STUDENT IN AN ORGANIZED HEALTH CARE EDUCATION/TRAINING PROGRAM

## 2022-11-22 VITALS
HEIGHT: 66 IN | TEMPERATURE: 98 F | HEART RATE: 65 BPM | OXYGEN SATURATION: 100 % | BODY MASS INDEX: 21.97 KG/M2 | WEIGHT: 136.69 LBS | SYSTOLIC BLOOD PRESSURE: 112 MMHG | DIASTOLIC BLOOD PRESSURE: 76 MMHG | RESPIRATION RATE: 16 BRPM

## 2022-11-22 DIAGNOSIS — Z53.21 ELOPED FROM EMERGENCY DEPARTMENT: Primary | ICD-10-CM

## 2022-11-22 LAB
ALBUMIN SERPL-MCNC: 4.3 GM/DL (ref 3.5–5)
ALBUMIN/GLOB SERPL: 1.5 RATIO (ref 1.1–2)
ALP SERPL-CCNC: 57 UNIT/L (ref 40–150)
ALT SERPL-CCNC: 7 UNIT/L (ref 0–55)
AST SERPL-CCNC: 12 UNIT/L (ref 5–34)
BASOPHILS # BLD AUTO: 0.02 X10(3)/MCL (ref 0–0.2)
BASOPHILS NFR BLD AUTO: 0.2 %
BILIRUBIN DIRECT+TOT PNL SERPL-MCNC: 0.5 MG/DL
BUN SERPL-MCNC: 7.6 MG/DL (ref 7–18.7)
CALCIUM SERPL-MCNC: 9.7 MG/DL (ref 8.4–10.2)
CHLORIDE SERPL-SCNC: 108 MMOL/L (ref 98–107)
CO2 SERPL-SCNC: 27 MMOL/L (ref 22–29)
CREAT SERPL-MCNC: 0.85 MG/DL (ref 0.55–1.02)
EOSINOPHIL # BLD AUTO: 0.04 X10(3)/MCL (ref 0–0.9)
EOSINOPHIL NFR BLD AUTO: 0.5 %
ERYTHROCYTE [DISTWIDTH] IN BLOOD BY AUTOMATED COUNT: 14.4 % (ref 11.5–17)
GFR SERPLBLD CREATININE-BSD FMLA CKD-EPI: >60 MLS/MIN/1.73/M2
GLOBULIN SER-MCNC: 2.9 GM/DL (ref 2.4–3.5)
GLUCOSE SERPL-MCNC: 89 MG/DL (ref 74–100)
HCT VFR BLD AUTO: 39.5 % (ref 37–47)
HGB BLD-MCNC: 12.9 GM/DL (ref 12–16)
IMM GRANULOCYTES # BLD AUTO: 0.02 X10(3)/MCL (ref 0–0.04)
IMM GRANULOCYTES NFR BLD AUTO: 0.2 %
LIPASE SERPL-CCNC: 8 U/L
LYMPHOCYTES # BLD AUTO: 1.79 X10(3)/MCL (ref 0.6–4.6)
LYMPHOCYTES NFR BLD AUTO: 21.9 %
MCH RBC QN AUTO: 29.5 PG (ref 27–31)
MCHC RBC AUTO-ENTMCNC: 32.7 MG/DL (ref 33–36)
MCV RBC AUTO: 90.2 FL (ref 80–94)
MONOCYTES # BLD AUTO: 0.74 X10(3)/MCL (ref 0.1–1.3)
MONOCYTES NFR BLD AUTO: 9.1 %
NEUTROPHILS # BLD AUTO: 5.6 X10(3)/MCL (ref 2.1–9.2)
NEUTROPHILS NFR BLD AUTO: 68.1 %
NRBC BLD AUTO-RTO: 0 %
PLATELET # BLD AUTO: 179 X10(3)/MCL (ref 130–400)
PMV BLD AUTO: 10.9 FL (ref 7.4–10.4)
POTASSIUM SERPL-SCNC: 4.5 MMOL/L (ref 3.5–5.1)
PROT SERPL-MCNC: 7.2 GM/DL (ref 6.4–8.3)
RBC # BLD AUTO: 4.38 X10(6)/MCL (ref 4.2–5.4)
SODIUM SERPL-SCNC: 142 MMOL/L (ref 136–145)
WBC # SPEC AUTO: 8.2 X10(3)/MCL (ref 4.5–11.5)

## 2022-11-22 PROCEDURE — 80053 COMPREHEN METABOLIC PANEL: CPT | Performed by: PHYSICIAN ASSISTANT

## 2022-11-22 PROCEDURE — 99283 EMERGENCY DEPT VISIT LOW MDM: CPT | Mod: 25

## 2022-11-22 PROCEDURE — 85025 COMPLETE CBC W/AUTO DIFF WBC: CPT | Performed by: PHYSICIAN ASSISTANT

## 2022-11-22 PROCEDURE — 83690 ASSAY OF LIPASE: CPT | Performed by: PHYSICIAN ASSISTANT

## 2022-11-22 RX ORDER — KETOROLAC TROMETHAMINE 30 MG/ML
30 INJECTION, SOLUTION INTRAMUSCULAR; INTRAVENOUS
Status: DISCONTINUED | OUTPATIENT
Start: 2022-11-22 | End: 2022-11-22 | Stop reason: HOSPADM

## 2022-11-22 RX ORDER — ONDANSETRON 4 MG/1
4 TABLET, ORALLY DISINTEGRATING ORAL
Status: DISCONTINUED | OUTPATIENT
Start: 2022-11-22 | End: 2022-11-22 | Stop reason: HOSPADM

## 2022-11-22 NOTE — ED PROVIDER NOTES
Encounter Date: 11/22/2022       History     Chief Complaint   Patient presents with    Abdominal Cramping     PT W CO PERIOD CRAMPS X 2 DAYS.       Patient with pmhx of dysmenorrhea presents today c/o lower abdominal cramping that started yesterday with onset of menses. She also endorses nausea. This is typical of her usual menstrual related pain. She has tried taking ibuprofen at home with no improvement. Patient says toradol PO typically helps her pain, but she does not have any. She is on ocps. Followed by [facility] gyn clinic.     The history is provided by the patient. No  was used.   Review of patient's allergies indicates:   Allergen Reactions    Amoxicillin Dermatitis     Other reaction(s): Rash     Past Medical History:   Diagnosis Date    Abnormal Pap smear of cervix      Past Surgical History:   Procedure Laterality Date    CYST REMOVAL      On right foot     Family History   Problem Relation Age of Onset    Diabetes Maternal Aunt      Social History     Tobacco Use    Smoking status: Never    Smokeless tobacco: Never   Substance Use Topics    Alcohol use: Yes     Comment: Occasionally    Drug use: Never     Review of Systems   Constitutional:  Negative for chills and fever.   Respiratory:  Negative for cough, chest tightness and shortness of breath.    Cardiovascular:  Negative for chest pain, palpitations and leg swelling.   Gastrointestinal:  Positive for nausea. Negative for abdominal pain, constipation, diarrhea, rectal pain and vomiting.   Genitourinary:  Positive for hematuria, menstrual problem, pelvic pain and vaginal bleeding. Negative for decreased urine volume, dysuria, flank pain, urgency, vaginal discharge and vaginal pain.   Musculoskeletal:  Negative for arthralgias and myalgias.   Skin:  Negative for rash.   Neurological:  Negative for syncope, light-headedness and headaches.     Physical Exam     Initial Vitals [11/22/22 1314]   BP Pulse Resp Temp SpO2   112/76 65 16 97.9  SUBJECTIVE:    Tramaine Ramirez is an 37 year old male, assigned to me, who presents for follow-up for swelling in his ankles which has been ongoing for some time.  Patient has a history of underweight as well as some vitamin deficiencies for which he has been taking some potassium supplementation as well as other multivitamins/vitamin supplements.  Patient is accompanied by mother today who states that his body appearance is been normal throughout the course of his life as well as very similar to his father's essential skinny appearance.  Patient states he has been noticing some small distention in his abdomen as well has been abstaining from any alcohol but otherwise feels well with some minor fatigue here and there but some improvement since last time I have seen him.  Patient has some lab work done at previous visit for which we have previously gone over but he is doing well overall past that.  No other new issues or concerns to discuss today.        PROBLEM LIST/PAST MEDICAL HISTORY:    There is no problem list on file for this patient.    Past Medical History:   Diagnosis Date   • Diabetes mellitus (CMS/HCC)    • Pneumonia       History reviewed. No pertinent surgical history.    Current Outpatient Medications   Medication Sig Dispense Refill   • folic acid (FOLATE) 1 MG tablet TAKE 1 TABLET BY MOUTH DAILY 30 tablet 1   • potassium CHLORIDE (KLOR-CON M) 20 MEQ julianne ER tablet      • potassium chloride (KLOR-CON) 10 MEQ ER tablet TAKE 1 TABLET BY MOUTH DAILY 7 tablet 0     No current facility-administered medications for this visit.     ALLERGIES:  No Known Allergies    Social History     Tobacco Use   • Smoking status: Current Every Day Smoker     Packs/day: 0.50     Years: 14.00     Pack years: 7.00     Types: Cigarettes   • Smokeless tobacco: Never Used   Substance Use Topics   • Alcohol use: Yes     Alcohol/week: 2.0 - 3.0 standard drinks     Types: 2 - 3 Cans of beer per week     Comment: 2-3 beers  evryother day          FAMILY HISTORY:    Family history reviewed and updated as needed    REVIEW OF SYSTEMS:  Eye Problem(s):negative  ENT Problem(s):negative  Cardiovascular problem(s):negative  Respiratory problem(s):negative  Gastro-intestinal problem(s):excessive gas or bloating  Genito-urinary problem(s):negative  Musculoskeletal problem(s):negative and muscular weakness  Integumentary problem(s):negative  Neurological problem(s):negative  Psychiatric problem(s):negative  Endocrine problem(s):negative  Hematologic and/or Lymphatic problem(s):negative      OBJECTIVE:    Visit Vitals  /72 (BP Location: RUE - Right upper extremity, Patient Position: Sitting, Cuff Size: Regular)   Pulse (!) 108   Ht 5' 9\" (1.753 m)   Wt 47 kg   SpO2 99%   BMI 15.31 kg/m²     Physical Exam:  Examination of the patient reveals:     GENERAL: appears stated age, in no distress, normal affect and ABNORMAL FINDINGS>> patient cachectic and appearance of malnutrition  SKIN normal color, normal texture, normal turgor, no skin rashes, no atypical appearing skin lesions and no bruises  HEAD: normocephalic  EYES: pupils are equal and reactive to light and accommodation extraocular movements are full sclerae and conjunctivae are normal lids and lashes are normal  NOSE: external nose is normal to inspection  MOUTH/THROAT: tongue is midline and appears normal, oropharynx appears normal, soft palate and uvula are normal and oral mucosa is normal  NECK: neck is supple, no thyromegaly, no anterior cervical adenopathy, no posterior cervical adenopathy and no supraclavicular adenopathy  CHEST: contour is normal with normal AP diameter, normal respiratory excursion and respiratory effort is not labored  LUNGS: lungs are clear to auscultation with normal inspiratory/expiratory sounds, no rales, no rhonchi and no wheezes  HEART: normal PMI, normal rate and rhythm, S1 and S2 normal, no S3 or S4, no murmurs and no extra heart sounds  ABDOMEN:  °F (36.6 °C) 100 %      MAP       --         Physical Exam    Vitals reviewed.  Constitutional: She appears well-developed and well-nourished. She is not diaphoretic. No distress.   HENT:   Head: Normocephalic and atraumatic.   Mouth/Throat: Oropharynx is clear and moist. No oropharyngeal exudate.   Eyes: Conjunctivae and EOM are normal.   Neck: Neck supple.   Normal range of motion.  Cardiovascular:  Normal rate, regular rhythm, normal heart sounds and intact distal pulses.           Pulmonary/Chest: Breath sounds normal. No respiratory distress.   Abdominal: Abdomen is soft. Bowel sounds are normal. She exhibits no distension. There is no abdominal tenderness. There is no rebound and no guarding.   Musculoskeletal:         General: No edema.      Cervical back: Normal range of motion and neck supple.     Neurological: She is alert and oriented to person, place, and time. GCS score is 15. GCS eye subscore is 4. GCS verbal subscore is 5. GCS motor subscore is 6.   Skin: Skin is warm and dry. Capillary refill takes less than 2 seconds.   Psychiatric: She has a normal mood and affect.     ED Course   Procedures  Labs Reviewed   COMPREHENSIVE METABOLIC PANEL - Abnormal; Notable for the following components:       Result Value    Chloride 108 (*)     All other components within normal limits   CBC WITH DIFFERENTIAL - Abnormal; Notable for the following components:    MCHC 32.7 (*)     MPV 10.9 (*)     All other components within normal limits   LIPASE - Normal   CBC W/ AUTO DIFFERENTIAL    Narrative:     The following orders were created for panel order CBC auto differential.  Procedure                               Abnormality         Status                     ---------                               -----------         ------                     CBC with Differential[[id number]]        Abnormal            Final result                 Please view results for these tests on the individual orders.          Imaging Results     None          Medications - No data to display          APC / Resident Notes:   I was not physically present during the history, exam or disposition of this patient. I was available at all times for consultation. (Jamie)                   Clinical Impression:   Final diagnoses:  [Z53.21] Eloped from emergency department (Primary)      ED Disposition Condition    Eloped Stable                EVERETT Hammond  11/22/22 1543       Heri Ayala MD  11/22/22 3120     abdomen is soft, normal active bowel sounds, nontender, without masses and ABNORMAL FINDINGS>> abdomen distended a lower portion of the abdomen, no ascites appreciated with percussion  NEUROLOGIC: cranial nerves 2 through 10 normal, motor strength normal, gait and station normal, coordination normal, DTR's normal and symmetric and no tremor noted  EXTREMITIES: no clubbing, no cyanosis, no edema and normal muscle tone and development bilaterally    ASSESSMENT AND PLAN:     1. Abdominal distention    2. Anemia due to folic acid deficiency, unspecified deficiency type    3. Weakness of both legs    4. Decreased liver function      1. Unsure of etiology be given patient's liver function another medical issues will be doing a CT of his abdomen and pelvis to rule out any other significant issues.  2. Will be doing cm P today and promoted continuation of his multivitamin usage and potassium supplementation based on his potassium levels today.  3. Continue with protein input and appropriate nutrition.  4. Given abdominal distension and his other possible liver function issues will monitor CMP today but likely will refer to a hepatologist for further evaluation.      Orders Placed This Encounter   • CT ABDOMEN PELVIS WO CONTRAST   • Comprehensive Metabolic Panel   • SERVICE TO GASTROENTEROLOGY   • DISCONTD: potassium chloride (KLOR-CON) 10 MEQ ER tablet

## 2023-01-23 ENCOUNTER — CLINICAL SUPPORT (OUTPATIENT)
Dept: GYNECOLOGY | Facility: CLINIC | Age: 28
End: 2023-01-23
Payer: COMMERCIAL

## 2023-01-23 DIAGNOSIS — N94.6 DYSMENORRHEA: Primary | ICD-10-CM

## 2023-01-23 DIAGNOSIS — Z30.9 ENCOUNTER FOR CONTRACEPTIVE MANAGEMENT, UNSPECIFIED TYPE: ICD-10-CM

## 2023-01-23 RX ORDER — KETOROLAC TROMETHAMINE 10 MG/1
10 TABLET, FILM COATED ORAL EVERY 6 HOURS PRN
COMMUNITY
Start: 2022-11-22 | End: 2024-02-25

## 2023-01-23 NOTE — PROGRESS NOTES
Established Patient - Audio Only Telehealth Visit     The patient location is: Home  The chief complaint leading to consultation is: Dysmenorrhea  Visit type: Virtual visit with audio only (telephone)  Total time spent with patient: 15 min     The reason for the audio only service rather than synchronous audio and video virtual visit was related to technical difficulties or patient preference/necessity.     Each patient to whom I provide medical services by telemedicine is:  (1) informed of the relationship between the physician and patient and the respective role of any other health care provider with respect to management of the patient; and (2) notified that they may decline to receive medical services by telemedicine and may withdraw from such care at any time. Patient verbally consented to receive this service via voice-only telephone call.     HPI: Ms. Ramos presents via telemedicine for follow up of dysmenorrhea.    She was last seen in 9/2022. At that time she was prescribed Seasonique for suppression of ovulation and was instructed to take NSAIDs 2-3 days prior to menses. Patient states she was still having painful, monthly periods on Seasonique and initially states she was taking medication at the same time every day. She states she went to the ED in 11/2022 where she was prescribed PO toradol and has been taking that for 2-3 days each month. She states that her pain is controlled on the toradol. On further discussion, she admits to having days where she does not take the pill. Discussed with patient that continuous birth control will not work unless taken every day, ideally close to the same time. Discussed with her that if she is forgetting to take the pill, depo Provera would be an option for suppression of ovulation. She states she would be concerned about weight gain with taking depo provera and would prefer to try again with taking the pills every day. She has no other complaints or concerns today.      Assessment and plan:    - Patient would like to try again with taking Seasonique  - Discussed with her that this medication needs to be taken every single day for its intended effect  - Discussed this is a continuous OCP and should suppress ovulation; she should not be having a period.   - Encouraged her to call clinic with any questions or concerns.     RTC 3 months for telemedicine follow up .     Discussed with Dr. Mccullough.     Daija Shields MD  [facility] PGY3                     This service was not originating from a related E/M service provided within the previous 7 days nor will  to an E/M service or procedure within the next 24 hours or my soonest available appointment.  Prevailing standard of care was able to be met in this audio-only visit.

## 2023-03-08 ENCOUNTER — OFFICE VISIT (OUTPATIENT)
Dept: GYNECOLOGY | Facility: CLINIC | Age: 28
End: 2023-03-08
Payer: COMMERCIAL

## 2023-03-08 VITALS
HEIGHT: 66 IN | OXYGEN SATURATION: 99 % | WEIGHT: 138 LBS | HEART RATE: 87 BPM | TEMPERATURE: 99 F | RESPIRATION RATE: 18 BRPM | DIASTOLIC BLOOD PRESSURE: 67 MMHG | SYSTOLIC BLOOD PRESSURE: 98 MMHG | BODY MASS INDEX: 22.18 KG/M2

## 2023-03-08 DIAGNOSIS — N94.6 DYSMENORRHEA: ICD-10-CM

## 2023-03-08 DIAGNOSIS — N92.0 MENORRHAGIA WITH REGULAR CYCLE: ICD-10-CM

## 2023-03-08 DIAGNOSIS — Z30.013 ENCOUNTER FOR INITIAL PRESCRIPTION OF INJECTABLE CONTRACEPTIVE: Primary | ICD-10-CM

## 2023-03-08 DIAGNOSIS — Z30.09 ENCOUNTER FOR COUNSELING REGARDING CONTRACEPTION: ICD-10-CM

## 2023-03-08 DIAGNOSIS — Z30.013 INITIATION OF DEPO PROVERA: ICD-10-CM

## 2023-03-08 DIAGNOSIS — N94.3 PREMENSTRUAL SYNDROME: ICD-10-CM

## 2023-03-08 DIAGNOSIS — N93.9 ABNORMAL UTERINE BLEEDING: ICD-10-CM

## 2023-03-08 LAB
B-HCG UR QL: NEGATIVE
CTP QC/QA: YES

## 2023-03-08 PROCEDURE — 81025 URINE PREGNANCY TEST: CPT | Mod: PBBFAC

## 2023-03-08 PROCEDURE — 96372 THER/PROPH/DIAG INJ SC/IM: CPT | Mod: PBBFAC

## 2023-03-08 PROCEDURE — 99213 OFFICE O/P EST LOW 20 MIN: CPT | Mod: PBBFAC,25

## 2023-03-08 RX ORDER — MEDROXYPROGESTERONE ACETATE 150 MG/ML
150 INJECTION, SUSPENSION INTRAMUSCULAR
Status: COMPLETED | OUTPATIENT
Start: 2023-03-08 | End: 2023-03-08

## 2023-03-08 RX ORDER — MEDROXYPROGESTERONE ACETATE 150 MG/ML
150 INJECTION, SUSPENSION INTRAMUSCULAR
Status: DISCONTINUED | OUTPATIENT
Start: 2023-06-06 | End: 2023-07-20

## 2023-03-08 RX ADMIN — MEDROXYPROGESTERONE ACETATE 150 MG: 150 INJECTION, SUSPENSION, EXTENDED RELEASE INTRAMUSCULAR at 10:03

## 2023-03-08 NOTE — PROGRESS NOTES
Gave patient the depo shot in the right arm. Patient tolerated well and seemed fine when she left the appointment. Patient was advised to call the clinic with any reactions or concerns,

## 2023-03-08 NOTE — PROGRESS NOTES
[facility] GYNECOLOGY CLINIC NOTE   Ellis Ramos is a 27 y.o.  presenting to GYN clinic for counseling on contraception. She has previously been on Seasonique but had difficulty taking daily pills, continued dysmenorrhea, and heavy bleeding. She reports that she has cycles every 30 days with 7-8 days of bleeding with 3 days of heavy bleeding, soaking through both a pad and tampon every hour. She endorses severe pain with her menstrual cycles that affects ADLs and has gone to the ER. She also endorses nausea, vomiting, and migraines with aura during her cycle. Her LMP was 2 weeks ago and she has not been sexually active since then. She has not taken Seasonique for the last month. She desires Depo Provera shots at this time.     She denies smoking or drug use. She drinks alcohol occasionally. She is sexually active.   She has had one abnormal pap one year ago and subsequent negative HPV. She denies any history of STDs.   Denies any medical problems or surgeries aside form a cyst removal on her foot. Does not take any medications.   She endorses a family history of lung, ovarian, and pancreatic cancer- both paternal and maternal grandmother had ovarian cancer and possibly mother as well. She denies any personal or family history of bleeding disorders.     Gynecology  OB History          0    Para   0    Term   0       0    AB   0    Living   0         SAB   0    IAB   0    Ectopic   0    Multiple   0    Live Births   0                Past Medical History:   Diagnosis Date    Abnormal Pap smear of cervix       Past Surgical History:   Procedure Laterality Date    CYST REMOVAL      On right foot      Current Outpatient Medications   Medication Instructions    ketorolac (TORADOL) 10 mg, Oral, Every 6 hours PRN    L norgest/e.estradioL-e.estrad (SEASONIQUE) 0.15 mg-30 mcg (84)/10 mcg (7) 3MPk 1 tablet, Oral, Daily     Social History     Tobacco Use    Smoking status: Never    Smokeless tobacco: Never  "  Substance Use Topics    Alcohol use: Yes     Comment: Occasionally    Drug use: Never       Review of Systems  Pertinent items are noted in HPI.     Objective:     BP 98/67 (BP Location: Right arm, Patient Position: Sitting, BP Method: Medium (Automatic))   Pulse 87   Temp 98.6 °F (37 °C) (Oral)   Resp 18   Ht 5' 6" (1.676 m)   Wt 62.6 kg (138 lb)   LMP 2023 (Exact Date)   SpO2 99%   BMI 22.27 kg/m²   Physical Exam:  Gen: Well-nourished, well-developed female appearing stated age. Alert, cooperative, in no acute distress.  HEENT: No thyromegaly, goiter, or masses  Breasts: General/bilateral: deferred    CV: rrr, no murmurs/rubs/gallops  Chest: ctabl, no wheezes/rales/rhonchi  Abdomen: Soft, mild tenderness to deep palpation in RUQ and LLQ, no masses.  Extrem: Extremities normal, atraumatic, non-tender calves.  Genitals: deferred     Relevant Labs:   none    Relevant Imaging:  none      Assessment:       27 y.o.  here for AUB, dysmenorrhea, and contraception counseling. Patient desires Depo Provera shot every 3 months.   No diagnosis found.       Plan:     Started on Depo Provera shots every 3 months.   -counseled on possible weight and decreased fertility for 18 months after stopping   -counseled on possible irregular bleeding after the first injection and then subsequent decreased menstrual bleeding     Problem List Items Addressed This Visit    None      Return to clinic with any further concerns about contraception or dysmenorrhea     Discussed patient and plan with Dr. Murrell.     "

## 2023-03-08 NOTE — LETTER
March 8, 2023      [facility]  [address] [address]  Phone: [phone]       Patient: Ellis Ramos   YOB: 1995  Date of Visit: 03/08/2023    To Whom It May Concern:    Rafaela Rmaos  was at [facility] on 03/08/2023. The patient may return to work/school on 03/08/2023 with no restrictions. If you have any questions or concerns, or if I can be of further assistance, please do not hesitate to contact me.    Sincerely,    Jaqueline Murrell MD

## 2023-03-09 NOTE — PROGRESS NOTES
[facility] GYNECOLOGY CLINIC NOTE   Ellis Ramos is a 27 y.o.  presenting to GYN clinic for counseling on contraception. She has previously been on Seasonique but had difficulty taking daily pills, continued dysmenorrhea, and heavy bleeding. She reports that she has cycles every 30 days with 7-8 days of bleeding with 3 days of heavy bleeding, soaking through both a pad and tampon every hour. She endorses severe pain with her menstrual cycles that affects ADLs and has gone to the ER. She also endorses nausea, vomiting, and migraines with aura during her cycle. Her LMP was 2 weeks ago and she has not been sexually active since then. She has not taken Seasonique for the last month. She desires Depo Provera shots at this time.     Denies any medical problems or surgeries aside form a cyst removal on her foot. Does not take any medications.     She endorses a family history of lung, ovarian, and pancreatic cancer- both paternal and maternal grandmother had ovarian cancer and possibly mother as well. She denies any personal or family history of bleeding disorders.     Gynecology  She has had one abnormal pap one year ago and subsequent negative HPV. She denies any history of STDs.     Past Medical History:   Diagnosis Date    Abnormal Pap smear of cervix       Past Surgical History:   Procedure Laterality Date    CYST REMOVAL      On right foot      Current Outpatient Medications   Medication Instructions    ketorolac (TORADOL) 10 mg, Oral, Every 6 hours PRN     Social History     Tobacco Use    Smoking status: Never    Smokeless tobacco: Never   Substance Use Topics    Alcohol use: Yes     Comment: Occasionally    Drug use: Never   She denies smoking or drug use. She drinks alcohol occasionally. She is sexually active.     Review of Systems  Pertinent items are noted in HPI.     Objective:     BP 98/67 (BP Location: Right arm, Patient Position: Sitting, BP Method: Medium (Automatic))   Pulse 87   Temp 98.6 °F (37  "°C) (Oral)   Resp 18   Ht 5' 6" (1.676 m)   Wt 62.6 kg (138 lb)   LMP 2023 (Exact Date)   SpO2 99%   BMI 22.27 kg/m²   Physical Exam:  Gen: Well-nourished, well-developed female appearing stated age. Alert, cooperative, in no acute distress.  CV: rrr, no murmurs/rubs/gallops  Chest: ctabl, no wheezes/rales/rhonchi  Abdomen: Soft, mild tenderness to deep palpation in RUQ and LLQ, no masses.  Extrem: Extremities normal, atraumatic, non-tender calves.  Genitals: deferred   Assessment:       27 y.o.  here for AUB, dysmenorrhea, and contraception counseling. Patient desires Depo Provera shot every 3 months.   1. Encounter for initial prescription of injectable contraceptive  medroxyPROGESTERone (DEPO-PROVERA) injection 150 mg    POCT Urine Pregnancy      2. Initiation of Depo Provera  medroxyPROGESTERone (DEPO-PROVERA) injection 150 mg      3. Abnormal uterine bleeding        4. Dysmenorrhea        5. Premenstrual syndrome        6. Menorrhagia with regular cycle        7. Encounter for counseling regarding contraception               Plan:     Started on Depo Provera shots every 3 months.   -counseled on possible weight and decreased fertility for 18 months after stopping   -counseled on possible irregular bleeding after the first injection and then subsequent decreased menstrual bleeding     Problem List Items Addressed This Visit          Renal/    Dysmenorrhea    Initiation of Depo Provera - Primary    Relevant Medications    medroxyPROGESTERone (DEPO-PROVERA) injection 150 mg (Completed)    medroxyPROGESTERone (DEPO-PROVERA) injection 150 mg (Start on 2023 12:00 AM)    Abnormal uterine bleeding    Premenstrual syndrome    Menorrhagia with regular cycle    Encounter for counseling regarding contraception     Initate Depo Provera contraceptive today, repeat q3 months  Discussed risk of weight gain, slow return of fertility up to 18 months  Possible irregular bleeding after the first injection " and then subsequent decrease in bleeding    Return to clinic with any further concerns about contraception or dysmenorrhea     Discussed patient and plan with Dr. Murrell.     Kathleen Santizo MD PGY-3  Obstetrics and Gynecology       Yes

## 2023-04-14 NOTE — PROGRESS NOTES
"  Subjective:       Patient ID: Ellis Ramos is a 26 y.o. female.    Chief Complaint:  Well Woman    History of Present Illness  The patient G0 here for annual exam. Her LMP was 22. Period last 5-7 days and changes pads every 2-3 hours. Pt states cycles are not heavy, however c/o dysmenorrhea with cycles since age of 15. Hx of OCPs in past, no relief. More recent dysmenorrhea, she had to be given Morphine and requesting stronger medications. Usually uses 2 tabs of 800 mg Ibuprofen, heating pads. Previously workup with Dr. Poe, however does not remember outcome. Pt has been followed by [facility], however feels that her concerns have not been addressed. Pt requesting hysterectomy. Pt states over last month she has been also having intermittent pelvic pain and endorses pain with intercourse. History of abnormal pap in [facility], pt had colpo with GYN. Denies breast or urinary complaints. Denies abnormal bleeding or discharge.Pt reports no STIs in the past and no concerns. HPV vaccinated. Denies tobacco use. Dep. screening 0. Denies fly hx of breast, uterine or colon cancer. Ovarian cancer in [facility].      GYN & OB History  Patient's last menstrual period was 2022.       Review of patient's allergies indicates:   Allergen Reactions    Amoxicillin Dermatitis     Other reaction(s): Rash     Past Medical History:   Diagnosis Date    Abnormal Pap smear of cervix      OB History    Para Term  AB Living   0 0 0 0 0 0   SAB IAB Ectopic Multiple Live Births   0 0 0 0 0        Review of Systems  Review of Systems    Negative except for pertinent findings for positives per HPI     Objective:    Physical Exam    BP 99/66 (BP Location: Left arm, Patient Position: Sitting, BP Method: Medium (Automatic))   Pulse 63   Temp 97.9 °F (36.6 °C)   Resp 18   Ht 5' 6" (1.676 m)   Wt 60.8 kg (134 lb)   LMP 2022   SpO2 100%   BMI 21.63 kg/m²   GENERAL: Well-developed female in no acute distress.  SKIN: " Normal to inspection, warm and intact.  BREASTS: No masses, lumps, discharge, tenderness.  VULVA: General appearance normal; external genitalia with no lesions or erythema.  VAGINA: Mucosa normal, pink, no discharge or lesions.  CERVIX: Grossly normal, pink, no erythema or discharge.  BIMANUAL EXAM: reveals a 6 week-sized uterus. The uterus is regular, mobile, and non tender. Yousif adnexa reveal no evidence of masses, tenderness.  PSYCHIATRIC: Patient is oriented to person, place, and time. Mood and affect are normal.    Assessment:       1. Pap smear for cervical cancer screening    2. Dysmenorrhea    3. Routine screening for STI (sexually transmitted infection)       Plan:   Ellis was seen today for well woman.    Diagnoses and all orders for this visit:    Pap smear for cervical cancer screening  -     Liquid-Based Pap Smear, Screening Screening    Dysmenorrhea  -     US Pelvis Comp with Transvag NON-OB (xpd; Future  -     POCT urine pregnancy    Routine screening for STI (sexually transmitted infection)  -     Liquid-Based Pap Smear, Screening Screening    Pap today with G/C  UPT neg  Pelvic uls ordered. Will schedule with GYN for evaluation.   Follow up in about 1 year (around 7/19/2023) for annual exam.     Detail Level: Detailed

## 2023-06-08 ENCOUNTER — CLINICAL SUPPORT (OUTPATIENT)
Dept: GYNECOLOGY | Facility: CLINIC | Age: 28
End: 2023-06-08
Payer: COMMERCIAL

## 2023-06-08 DIAGNOSIS — Z30.9 ENCOUNTER FOR CONTRACEPTIVE MANAGEMENT, UNSPECIFIED TYPE: Primary | ICD-10-CM

## 2023-06-08 PROCEDURE — 96372 THER/PROPH/DIAG INJ SC/IM: CPT | Mod: PBBFAC

## 2023-06-08 RX ADMIN — MEDROXYPROGESTERONE ACETATE 150 MG: 150 INJECTION, SUSPENSION, EXTENDED RELEASE INTRAMUSCULAR at 09:06

## 2023-06-08 NOTE — PROGRESS NOTES
Depo-Provera injection administered per Kathleen Geronimo MD order. Patient tolerated well and left in stable condition.

## 2023-06-13 ENCOUNTER — PATIENT MESSAGE (OUTPATIENT)
Dept: RESEARCH | Facility: HOSPITAL | Age: 28
End: 2023-06-13
Payer: COMMERCIAL

## 2023-06-20 ENCOUNTER — PATIENT MESSAGE (OUTPATIENT)
Dept: RESEARCH | Facility: HOSPITAL | Age: 28
End: 2023-06-20
Payer: COMMERCIAL

## 2023-06-27 ENCOUNTER — PATIENT MESSAGE (OUTPATIENT)
Dept: RESEARCH | Facility: HOSPITAL | Age: 28
End: 2023-06-27
Payer: COMMERCIAL

## 2023-07-11 ENCOUNTER — PATIENT MESSAGE (OUTPATIENT)
Dept: RESEARCH | Facility: HOSPITAL | Age: 28
End: 2023-07-11
Payer: COMMERCIAL

## 2023-07-20 ENCOUNTER — OFFICE VISIT (OUTPATIENT)
Dept: GYNECOLOGY | Facility: CLINIC | Age: 28
End: 2023-07-20
Payer: COMMERCIAL

## 2023-07-20 VITALS
WEIGHT: 147.19 LBS | TEMPERATURE: 98 F | HEART RATE: 72 BPM | BODY MASS INDEX: 23.65 KG/M2 | HEIGHT: 66 IN | DIASTOLIC BLOOD PRESSURE: 62 MMHG | SYSTOLIC BLOOD PRESSURE: 96 MMHG | RESPIRATION RATE: 20 BRPM

## 2023-07-20 DIAGNOSIS — N94.6 DYSMENORRHEA: ICD-10-CM

## 2023-07-20 DIAGNOSIS — Z01.419 ENCOUNTER FOR ANNUAL ROUTINE GYNECOLOGICAL EXAMINATION: Primary | ICD-10-CM

## 2023-07-20 PROCEDURE — 3078F PR MOST RECENT DIASTOLIC BLOOD PRESSURE < 80 MM HG: ICD-10-PCS | Mod: CPTII,,, | Performed by: NURSE PRACTITIONER

## 2023-07-20 PROCEDURE — 3074F SYST BP LT 130 MM HG: CPT | Mod: CPTII,,, | Performed by: NURSE PRACTITIONER

## 2023-07-20 PROCEDURE — 99213 OFFICE O/P EST LOW 20 MIN: CPT | Mod: PBBFAC | Performed by: NURSE PRACTITIONER

## 2023-07-20 PROCEDURE — 3008F BODY MASS INDEX DOCD: CPT | Mod: CPTII,,, | Performed by: NURSE PRACTITIONER

## 2023-07-20 PROCEDURE — 3078F DIAST BP <80 MM HG: CPT | Mod: CPTII,,, | Performed by: NURSE PRACTITIONER

## 2023-07-20 PROCEDURE — 99395 PREV VISIT EST AGE 18-39: CPT | Mod: S$PBB,,, | Performed by: NURSE PRACTITIONER

## 2023-07-20 PROCEDURE — 3008F PR BODY MASS INDEX (BMI) DOCUMENTED: ICD-10-PCS | Mod: CPTII,,, | Performed by: NURSE PRACTITIONER

## 2023-07-20 PROCEDURE — 99395 PR PREVENTIVE VISIT,EST,18-39: ICD-10-PCS | Mod: S$PBB,,, | Performed by: NURSE PRACTITIONER

## 2023-07-20 PROCEDURE — 3074F PR MOST RECENT SYSTOLIC BLOOD PRESSURE < 130 MM HG: ICD-10-PCS | Mod: CPTII,,, | Performed by: NURSE PRACTITIONER

## 2023-07-20 RX ORDER — MEDROXYPROGESTERONE ACETATE 150 MG/ML
150 INJECTION, SUSPENSION INTRAMUSCULAR
Status: ACTIVE | OUTPATIENT
Start: 2023-09-12 | End: 2024-12-04

## 2023-07-20 NOTE — PROGRESS NOTES
"  Subjective:       Patient ID: Ellis Ramos is a 27 y.o. female.    Chief Complaint:  Gynecologic Exam      History of Present Illness  The patient G0 here for annual exam. Pt was started on Depo in 3/2023 for dysmenorrhea per GYN. She is currently amenorrheic and wishes to continue. Does admit some pelvic pain since her last Depo in 2023, described as "stabbing", occurs occasionally last approx a few mins. History of abnormal pap in [facility], pt had colpo with GYN. Pap in [facility]. Denies breast or urinary complaints. Denies abnormal bleeding or discharge.Pt reports no STIs in the past and no concerns. HPV vaccinated. Denies tobacco use. Dep. screening 0. Denies fly hx of breast, uterine or colon cancer. Ovarian cancer in PGM and [facility].    GYN & OB History  No LMP recorded. Patient has had an injection.   Date of Last Pap: 2022    Review of patient's allergies indicates:   Allergen Reactions    Amoxicillin Dermatitis     Other reaction(s): Rash     Past Medical History:   Diagnosis Date    Abnormal Pap smear of cervix      OB History    Para Term  AB Living   0 0 0 0 0 0   SAB IAB Ectopic Multiple Live Births   0 0 0 0 0        Review of Systems  Review of Systems    Negative except for pertinent findings for positives per HPI     Objective:    Physical Exam    BP 96/62 (BP Location: Left arm, Patient Position: Sitting, BP Method: Medium (Automatic))   Pulse 72   Temp 98.1 °F (36.7 °C) (Oral)   Resp 20   Ht 5' 6" (1.676 m)   Wt 66.8 kg (147 lb 3.2 oz)   BMI 23.76 kg/m²   GENERAL: Well-developed female in no acute distress.  SKIN: Normal to inspection, warm and intact.  BREASTS: No masses, lumps, discharge, tenderness.  VULVA: General appearance normal; external genitalia with no lesions or erythema.  VAGINA: Mucosa normal, pink, no abnormal discharge or lesions.  CERVIX: Grossly normal, pink, no erythema or abnormal discharge.  BIMANUAL EXAM: reveals week-sized uterus. The uterus is " regular, mobile, and non tender. Yousif adnexa reveal no evidence of masses, tenderness.  PSYCHIATRIC: Patient is oriented to person, place, and time. Mood and affect are normal.    Assessment:       1. Encounter for annual routine gynecological examination    2. Dysmenorrhea         Plan:   Ellis was seen today for gynecologic exam.    Diagnoses and all orders for this visit:    Encounter for annual routine gynecological examination  -     medroxyPROGESTERone (DEPO-PROVERA) injection 150 mg    Dysmenorrhea    Pelvic today, pap utd per ACOG  Continue Depo  Discussed long term use of Depo can cause bone weakening. Diet high in calcium and vit. D. Pt instructed to increase dietary calcium and vit. D intake. Encouraged dietary calcium rich foods like broccoli, mustard, turnip greens, oranges, dairy products, sardines, salmon, and almonds. Encouraged Vit. D absorption with 30 mins in sun 5 days/week and fatty fish. Increase muscle strengthening and aerobic exercise (walking, climbing, bicycle) at least 30 mins for 3 times/week. Handouts provided.  Discussed risk and benefits of Depo to include weight gain and amenorrhea. Discussed delayed return of ovulation and cycles with Depo, encouraged to stop Depo at least 1 year prior to decision to conceive. Also discussed it can take up to 3-4 shots to regulate cycles with Depo during this time may experience irregular or prolonged bleeding. Pt verbalized understanding.   Follow up in about 1 year (around 7/20/2024) for annual exam.

## 2023-08-21 ENCOUNTER — TELEPHONE (OUTPATIENT)
Dept: GYNECOLOGY | Facility: CLINIC | Age: 28
End: 2023-08-21
Payer: COMMERCIAL

## 2023-08-21 DIAGNOSIS — N93.9 ABNORMAL UTERINE BLEEDING: Primary | ICD-10-CM

## 2023-08-21 NOTE — TELEPHONE ENCOUNTER
----- Message from JENNA King sent at 8/21/2023  9:23 AM CDT -----  Regarding: RE: Depo Shot  At annual in 7/2023, my note states she was amenorrheic. It is common to have some vaginal bleeding with Depo and can take up to 1 year to regulate. She started Depo with GYN in 3/2023. Need more info on bleeding pattern, change pads how often and when did the bleeding restart?  ----- Message -----  From: Ashanti Cervantes MT  Sent: 8/21/2023   9:02 AM CDT  To: JENNA King  Subject: FW: Depo Shot                                    Patient started Depo on 03/08/23. Annual with you on 07/19/22. She has been seen by GYN for Dysmenorrhea and her next annual appointment is 07/25/2024.  ----- Message -----  From: Sammy Ray  Sent: 8/18/2023   3:51 PM CDT  To: Dani DE Staff  Subject: Depo Shot                                        The patient above called about some concerns she is having because she received the Depo shot and is still having some bleeding and wanted to know if it is normal to still cramp and bleed while on the shot. Please advise, Thanks.

## 2023-08-21 NOTE — TELEPHONE ENCOUNTER
Patient called back stating that she is on the depo and is still bleeding as well as her cramping has not gotten better at all. She restarted depo back in 03/2023 for AUB and dysmenorrhea. She has only received 2 injections at this point. She stated she started her cycle last Tuesday and is still on it today. She admits to have somewhat of a cycle every month. When she is on her cycle she changed her pads every 4 hours, admits to feeling weak and dizzy when she is on her cycle. She also stated that her cramping has not gotten better. The cramping happens even when she is not on her cycle. She rates the pain 8/10. She is taking ketorolac to try to help the pain and it is not helping her anymore. She would like to know what she needs to do. Please advise. Thank you.     Pharmacy is correct in patients chart if needed.

## 2023-09-14 ENCOUNTER — TELEPHONE (OUTPATIENT)
Dept: GYNECOLOGY | Facility: CLINIC | Age: 28
End: 2023-09-14

## 2023-09-14 ENCOUNTER — CLINICAL SUPPORT (OUTPATIENT)
Dept: GYNECOLOGY | Facility: CLINIC | Age: 28
End: 2023-09-14
Payer: COMMERCIAL

## 2023-09-14 DIAGNOSIS — N93.9 ABNORMAL UTERINE BLEEDING: Primary | ICD-10-CM

## 2023-09-14 PROCEDURE — 96372 THER/PROPH/DIAG INJ SC/IM: CPT | Mod: PBBFAC

## 2023-09-14 RX ADMIN — MEDROXYPROGESTERONE ACETATE 150 MG: 150 INJECTION, SUSPENSION INTRAMUSCULAR at 10:09

## 2023-09-14 NOTE — PROGRESS NOTES
Depo provera given in left hip, tolerated well.  Let her know about blood work ordered in August, she'll get it done today

## 2023-09-14 NOTE — TELEPHONE ENCOUNTER
Called patient x2 without answer. Left VM regarding normal labs with instruction to call back with further questions.    Lizzette Shaver MD  [employer] PGY-2  09/14/2023 5:32 PM

## 2023-12-04 ENCOUNTER — CLINICAL SUPPORT (OUTPATIENT)
Dept: GYNECOLOGY | Facility: CLINIC | Age: 28
End: 2023-12-04
Payer: COMMERCIAL

## 2023-12-04 DIAGNOSIS — R10.2 PELVIC PAIN: Primary | ICD-10-CM

## 2023-12-04 NOTE — PROGRESS NOTES
"    [facility] Progress Note    Primary Site: [facility]  Patient location: Home  Physician location: In office      Chief Complaint: Follow up of pelvic pain    HPI  Ellis Ramos joined me via our telemedicine platform.    28 y.o. G0 here for follow up of pelvic pain.     Per last visit with NP 07/23:     The patient G0 here for annual exam. Pt was started on Depo in 3/2023 for dysmenorrhea per GYN. She is currently amenorrheic and wishes to continue. Does admit some pelvic pain since her last Depo in 6/2023, described as "stabbing", occurs occasionally last approx a few mins.     Reports that she is still experiencing this pelvic pain, occurs approximately once or twice/week, lasts several minutes. Occasionally takes Toradol for the pain, otherwise does not like to take medicine. Reports pain is worse with intercourse specifically deep penetration. Discussed TVUS results showing thick walled R ovarian cyst and para-ovarian cyst on left side. Pt reports bleeding as improved from last visit, reports light bleeding once/month.     I have reviewed family history, social history, medications and allergies as documented in the patient's electronic medical record.    Reports, labs, outside records, and images have been reviewed with pertinent interpretations below. See telemedicine notes records for intervening communications.    TVUS 07/22    FINDINGS:  Uterus:     Size: 8.3 x 3.8 x 4 cm     Masses: There is heterogenicity of the uterine parenchyma     Endometrium: Endometrial stripe measures approximately 9 mm with some ill-defined areas and with some areas of increased vascularity.     Right ovary:     Size: 3.7 x 2.6 x 2.5 cm cm     Appearance: 18 wall cyst identified measures 2.6 x 1.7 x 1.8 cm     Vascular flow: Normal.     Left ovary:     Size: 3.2 x 2.2 x 2.7 cm     Appearance: With a small cystic structure adjacent to the ovary that measures 9 mm x 8 mm x 1.1 cm     Vascular Flow: Normal.     Free " Fluid:     None.     Impression:     Endometrial stripe at 9 mm slightly ill-defined with some areas of increased vascularity     Thick wall cyst in the right ovary with measurements as above.     Paraovarian cyst on the left with measurements as above some areas with slight increased vascularity    Assessment/Plan  Problem List Items Addressed This Visit    None  Visit Diagnoses       Pelvic pain    -  Primary    Relevant Orders    US Pelvis Comp with Transvag NON-OB (xpd          28 y.o. G0 here for follow up of pelvic pain.     Pelvic Pain:   - Reviewed US findings including thick walled right ovarian cyst from 07/22. Plan to obtain repeat TVUS as it has been over a year since last imaging.   - Discussed with pt multiple possible etiologies of pelvic pain. Recommend in person visit for pelvic exam. Pt voiced understanding.   - RTC in 3 months for in person exam, and to discuss TVUS findings     See correspondence above for plan.   Patient's needs assessed and health education provided. Patient understands risks, benefits, and alternatives of treatment prescribed above. Patient verbalizes understanding and agrees to follow plan.    Patient's identity was confirmed and confidentiality/privacy confirmed prior to visit. I certify that this visit was done via secure two-way transmission with informed consent of the patient and/or guardian.  Each patient to whom I provide medical services by telemedicine is:  (1) informed of the relationship between the physician and patient and the respective role of any other health care provider with respect to management of the patient; and (2) notified that they may decline to receive medical services by telemedicine and may withdraw from such care at any time. Patient verbally consented to receive this service via voice-only telephone call.    Audio only was selected because there was no capability for video conferencing with patient.    50% of the time was counseling or  coordinating care.  Start Time: 8:15 AM   End Time: 8:20 AM     Pam Dias MD   [facility] PGY-3

## 2023-12-05 ENCOUNTER — TELEPHONE (OUTPATIENT)
Dept: GYNECOLOGY | Facility: CLINIC | Age: 28
End: 2023-12-05
Payer: COMMERCIAL

## 2023-12-05 NOTE — TELEPHONE ENCOUNTER
Can we please schedule this patient for a 1-2 month follow up with resident clinic for Ultrasound results and pelvic exam? Thank you!       ----- Message from Pam Dias MD sent at 12/4/2023  8:14 AM CST -----  Regarding: Schedule follow up  Hi!     For this telehealth friend we will be getting a TVUS and scheduling a follow up in person visit for pelvic exam and results review, do we have any availability within the next couple of months?     Thanks,  Pam

## 2023-12-14 ENCOUNTER — CLINICAL SUPPORT (OUTPATIENT)
Dept: GYNECOLOGY | Facility: CLINIC | Age: 28
End: 2023-12-14
Payer: COMMERCIAL

## 2023-12-14 DIAGNOSIS — N94.6 DYSMENORRHEA: Primary | ICD-10-CM

## 2023-12-14 PROCEDURE — 96372 THER/PROPH/DIAG INJ SC/IM: CPT | Mod: PBBFAC

## 2023-12-14 RX ADMIN — MEDROXYPROGESTERONE ACETATE 150 MG: 150 INJECTION, SUSPENSION INTRAMUSCULAR at 10:12

## 2023-12-22 ENCOUNTER — HOSPITAL ENCOUNTER (OUTPATIENT)
Dept: RADIOLOGY | Facility: HOSPITAL | Age: 28
Discharge: HOME | End: 2023-12-22
Attending: STUDENT IN AN ORGANIZED HEALTH CARE EDUCATION/TRAINING PROGRAM
Payer: COMMERCIAL

## 2023-12-22 DIAGNOSIS — R10.2 PELVIC PAIN: ICD-10-CM

## 2023-12-22 PROCEDURE — 76830 TRANSVAGINAL US NON-OB: CPT | Mod: TC

## 2024-01-02 ENCOUNTER — TELEPHONE (OUTPATIENT)
Dept: GYNECOLOGY | Facility: CLINIC | Age: 29
End: 2024-01-02
Payer: COMMERCIAL

## 2024-01-02 NOTE — TELEPHONE ENCOUNTER
----- Message from Sammy Ray sent at 1/2/2024 10:47 AM CST -----  Regarding: Results  The patient above called because she would like for someone to go over her results with her. I did let her know that she has an appt scheduled on 01/22 to go over that but she still would like a call. Please advise,Thanks.   Will message Dr Shields and ask her to review US results and get back with me. Patient verbalizes understanding

## 2024-01-03 ENCOUNTER — TELEPHONE (OUTPATIENT)
Dept: GYNECOLOGY | Facility: CLINIC | Age: 29
End: 2024-01-03
Payer: COMMERCIAL

## 2024-01-03 NOTE — TELEPHONE ENCOUNTER
Called patient to review TVUS with her. Discussed thickened endometrium, possible polyp or other underlying pathology. Discussed with her we will plan to do EMB as well as pelvic pain exam on her in person visit 1/22/24. She states understanding, all questions answered.     Discussed and US reviewed with Dr. Murrell.     Daija Shields MD  [facility] PGY4

## 2024-01-04 ENCOUNTER — TELEPHONE (OUTPATIENT)
Dept: GYNECOLOGY | Facility: CLINIC | Age: 29
End: 2024-01-04
Payer: COMMERCIAL

## 2024-01-04 NOTE — TELEPHONE ENCOUNTER
Called patient to let her know that Dr Shields looked over her US and will go over results during her appt in Jan, nothing to worry about

## 2024-01-22 ENCOUNTER — OFFICE VISIT (OUTPATIENT)
Dept: GYNECOLOGY | Facility: CLINIC | Age: 29
End: 2024-01-22
Payer: COMMERCIAL

## 2024-01-22 VITALS
WEIGHT: 147 LBS | HEIGHT: 66 IN | TEMPERATURE: 99 F | DIASTOLIC BLOOD PRESSURE: 69 MMHG | HEART RATE: 80 BPM | BODY MASS INDEX: 23.63 KG/M2 | SYSTOLIC BLOOD PRESSURE: 103 MMHG | OXYGEN SATURATION: 100 % | RESPIRATION RATE: 18 BRPM

## 2024-01-22 DIAGNOSIS — N85.00 ENDOMETRIAL HYPERPLASIA: Primary | ICD-10-CM

## 2024-01-22 LAB
B-HCG UR QL: NEGATIVE
CTP QC/QA: YES

## 2024-01-22 PROCEDURE — 99213 OFFICE O/P EST LOW 20 MIN: CPT | Mod: PBBFAC,25

## 2024-01-22 PROCEDURE — 58100 BIOPSY OF UTERUS LINING: CPT | Mod: 73,PBBFAC | Performed by: OBSTETRICS & GYNECOLOGY

## 2024-01-22 PROCEDURE — 81025 URINE PREGNANCY TEST: CPT | Mod: PBBFAC

## 2024-01-22 NOTE — PROGRESS NOTES
[facility] OB/[facility]N CLINIC NOTE  [facility]  [address]  [place] [address]  Phone: [phone]  Fax: [phone]    Subjective:     Ellis Ramos is a 28 y.o., G0, uterine fibroid, b/l ovarian cysts, dysmenorrhea, and R pelvic pain, who presents to GYN clinic for endometrial biopsy.     1) Right-sided Pelvic pain   Onset was 12/2023, that is sharp and throbbing  That's pelvic pain occurs 2-3x weekly that lasts about 2 hours every morning; nothing makes the pain worse but improved with lying down and intercourse upon insertion and deep penetration  Has only tried OTC Tylenol but minimal pain relief  Pt was seen previously in clinic on 12/04/23  Repeat TVUS showed Thickened, heterogeneous endometrium with increased vascularity; uterine fibroid measuring 1 cm, Heterogeneous endometrium measuring 1.5 cm in thickness with increased vascularity within the endometrium  Previous TVUS on 07/29/22 - showed Endometrial stripe measures approximately 9 mm with some ill-defined areas and with some areas of increased vascularity   Despite patient being on Depo since 03/2023 (last given on 12/04/23 in previous clinic visit)   Patient was contacted regarding results and was scheduled to do EMB in today's clinic visit   Denies fever, chills, difficulty with urination, change in bowel habits/appetite, but admits to pelvic pain         Allergies: Amoxicillin  OBHx: none  GynHx:  LMP: 09/2023   History of abnormal pap in 2021-ASCUS;pt had colpo with GYN. Pap in 2022-NIL   Contraception: Depo since 03/2023 in setting of dysmenorrhea, compliant.    SurgHx:   Past Surgical History:   Procedure Laterality Date    CYST REMOVAL      On right foot     Medications:     Current Outpatient Medications:     ketorolac (TORADOL) 10 mg tablet, Take 10 mg by mouth every 6 (six) hours as needed., Disp: , Rfl:     Current Facility-Administered Medications:     medroxyPROGESTERone (DEPO-PROVERA) injection 150 mg, 150 mg, Intramuscular, Q90 Days, Dani  "JENNA Parikh, 150 mg at 12/14/23 1015    FM Hx: Denies hx of ovarian, uterine, endometrial, or colon cancer. Denies history of bleeding or coagulation disorders.  Family History   Problem Relation Age of Onset    Ovarian cancer Paternal Grandmother     Ovarian cancer Maternal Grandmother     Diabetes Maternal Aunt        Social Hx: Denies tobacco, alcohol and illicit drug usage.  Social History     Socioeconomic History    Marital status: Single   Tobacco Use    Smoking status: Some Days     Types: Cigarettes, Vaping with nicotine     Passive exposure: Current    Smokeless tobacco: Current   Substance and Sexual Activity    Alcohol use: Not Currently     Comment: Occasionally    Drug use: Never    Sexual activity: Yes     Partners: Male     Birth control/protection: None       Review of Systems  Denies fevers, chills, headache, blurry vision, nausea, vomiting, dizziness, or syncope.   Denies chest pain, shortness of breath, RUQ pain, or calf pain.    Objective:     Vitals:    01/22/24 1031   BP: 103/69   Pulse: 80   Resp: 18   Temp: 98.7 °F (37.1 °C)   TempSrc: Oral   SpO2: 100%   Weight: 66.7 kg (147 lb)   Height: 5' 6" (1.676 m)     Body mass index is 23.73 kg/m².    Physical Exam:   General: alert and oriented, in no acute distress  Lungs: clear to auscultation bilaterally, no conversational dyspnea  Heart: regular rate and rhythm  Abdomen: Soft, non-distended, mild tender to palpation in right adnexal region, no involuntary guarding, no rebound tenderness  Extremities: Normal, atraumatic, non-edematous, No cords or calf tenderness, No significant calf/ankle edema  External genitalia: Normal female genitalia without lesion, discharge or tenderness. Normal appearing urethral meatus. Normal appearing external anus.  Bimanual Exam: No pelvic lymphadenopathy noted bilaterally. Vagina with adequate capacity. Uterus 8cm in size, no cervical motion tenderness. Smooth in contour, no masses. Good descent, mobile. Tenderness " present on posterior vaginal wall on levator ani muscle. No adnexal fullness/tenderness. Normal urethra. Normal bladder  Speculum Exam: Vaginal mucosa normal in appearance. Pink. No masses/lesions. Cervix well visualized deviated to the right along with uterus, smooth in contour no masses or lesions. Os normal in appearance, no blood or discharge coming from the os    Note: RN chaperone present for entirety of exam.     Imaging  No images are attached to the encounter.  Assessment/Plan:      Endometrial Mass  Increase in endometrial strip from 9 mm on 07/2022 to 1.5 cm on 12/2022  Failure to achieve endometrial sample today in outpt clinic due to right-sided deviated uterus and cervix  Will plan for outpatient hysteroscopy with D&C; will contact patient with appt time/date    Health maintenance  Pap 07/19/22 - NIL  RTC in 6 months with GYN NP.    Future Appointments   Date Time Provider Department Center   3/14/2024 10:10 AM NURSE, [facility] GYN [facility]   7/25/2024  9:10 AM Heather Louise, ANP [facility] GYN [facility]       Patient and plan were discussed with Dr. Murrell.     Guanaco Cooney MD   [facility] Resident HO-1  01/22/2024

## 2024-01-29 ENCOUNTER — TELEPHONE (OUTPATIENT)
Dept: GYNECOLOGY | Facility: CLINIC | Age: 29
End: 2024-01-29
Payer: COMMERCIAL

## 2024-01-29 NOTE — TELEPHONE ENCOUNTER
Dr. Cooney saw her on 1/22/24 and told her she would be getting a call with a SX date for [facility] D&C.          ----- Message from Adia Perdue sent at 1/26/2024  8:09 AM CST -----  Regarding: SX date  Above pt is calling for her SX date please Advise Thanks

## 2024-01-31 ENCOUNTER — TELEPHONE (OUTPATIENT)
Dept: GYNECOLOGY | Facility: CLINIC | Age: 29
End: 2024-01-31
Payer: COMMERCIAL

## 2024-01-31 NOTE — TELEPHONE ENCOUNTER
----- Message from Adia Perdue sent at 1/31/2024  9:23 AM CST -----  Regarding: SX date  Above pt is calling for her SX date. Please Advise Thanks

## 2024-02-04 ENCOUNTER — TELEPHONE (OUTPATIENT)
Dept: GYNECOLOGY | Facility: CLINIC | Age: 29
End: 2024-02-04
Payer: COMMERCIAL

## 2024-02-04 NOTE — TELEPHONE ENCOUNTER
Called Ms Ellensocrates Richard to discuss surgical planning. Voice recording stated not accepting calls at this time. Will attempt again at later date.    Jeff De Anda MD  [facility]  PGY-3

## 2024-02-08 ENCOUNTER — TELEPHONE (OUTPATIENT)
Dept: GYNECOLOGY | Facility: CLINIC | Age: 29
End: 2024-02-08
Payer: COMMERCIAL

## 2024-02-08 NOTE — TELEPHONE ENCOUNTER
Hi,  Pt called wanting to schedule her sx.  I told her to make sure to answer her phone and to be expecting a call.  # [phone]

## 2024-02-09 ENCOUNTER — TELEPHONE (OUTPATIENT)
Dept: GYNECOLOGY | Facility: CLINIC | Age: 29
End: 2024-02-09
Payer: COMMERCIAL

## 2024-02-09 NOTE — TELEPHONE ENCOUNTER
----- Message from Sammy Ray sent at 2/9/2024  9:39 AM CST -----  Regarding: Schedule Surgery  The patient above called because she would like to be scheduled for surgery and would like for someone to call her back as soon as possible. Please advise, thanks.

## 2024-02-18 ENCOUNTER — TELEPHONE (OUTPATIENT)
Dept: GYNECOLOGY | Facility: CLINIC | Age: 29
End: 2024-02-18
Payer: COMMERCIAL

## 2024-02-18 NOTE — TELEPHONE ENCOUNTER
Called Ms Ellis Ramos again to discuss surgical planning. Phone number listed as her mobile number is actually patient's mother's telephone number. Spoke with her mother who inquired about scheduling patient's surgery. Instructed her to have her daughter call the clinic this week during clinic hours to discuss scheduling     Also called 2nd number listed (listed at home number) confirmed this is patient's cell phone number. No answer x 2. Left voicemail.    Jeff De Anda MD  [facility]  PGY-3

## 2024-02-19 ENCOUNTER — TELEPHONE (OUTPATIENT)
Dept: GYNECOLOGY | Facility: CLINIC | Age: 29
End: 2024-02-19
Payer: COMMERCIAL

## 2024-02-19 NOTE — TELEPHONE ENCOUNTER
Patient returning Dr. Macedo call yesterday to discuss scheduling her surgery. I informed patient I will let the doctors know she called and will return her call today. Patient verbalized understanding.       Jeff De Anda MD    2/18/24  5:28 PM  Note  Called Ms Ellis Ramos again to discuss surgical planning. Phone number listed as her mobile number is actually patient's mother's telephone number. Spoke with her mother who inquired about scheduling patient's surgery. Instructed her to have her daughter call the clinic this week during clinic hours to discuss scheduling      Also called 2nd number listed (listed at home number) confirmed this is patient's cell phone number. No answer x 2. Left voicemail.     Jeff De Anda MD  [facility]  PGY-3

## 2024-02-20 ENCOUNTER — HOSPITAL ENCOUNTER (EMERGENCY)
Facility: HOSPITAL | Age: 29
Discharge: HOME | End: 2024-02-20
Attending: INTERNAL MEDICINE
Payer: COMMERCIAL

## 2024-02-20 VITALS
RESPIRATION RATE: 16 BRPM | HEART RATE: 78 BPM | TEMPERATURE: 98 F | BODY MASS INDEX: 24.21 KG/M2 | DIASTOLIC BLOOD PRESSURE: 77 MMHG | OXYGEN SATURATION: 100 % | SYSTOLIC BLOOD PRESSURE: 114 MMHG | WEIGHT: 150 LBS

## 2024-02-20 DIAGNOSIS — N93.9 VAGINAL BLEEDING: Primary | ICD-10-CM

## 2024-02-20 LAB
ALBUMIN SERPL-MCNC: 4.3 G/DL (ref 3.5–5)
ALBUMIN/GLOB SERPL: 1.4 RATIO (ref 1.1–2)
ALP SERPL-CCNC: 54 UNIT/L (ref 40–150)
ALT SERPL-CCNC: 8 UNIT/L (ref 0–55)
APPEARANCE UR: ABNORMAL
AST SERPL-CCNC: 13 UNIT/L (ref 5–34)
B-HCG UR QL: NEGATIVE
BACTERIA #/AREA URNS AUTO: ABNORMAL /HPF
BASOPHILS # BLD AUTO: 0.02 X10(3)/MCL
BASOPHILS NFR BLD AUTO: 0.3 %
BILIRUB SERPL-MCNC: 1 MG/DL
BILIRUB UR QL STRIP.AUTO: NEGATIVE
BUN SERPL-MCNC: 8.1 MG/DL (ref 7–18.7)
CALCIUM SERPL-MCNC: 9.4 MG/DL (ref 8.4–10.2)
CAOX CRY URNS QL MICRO: ABNORMAL /HPF
CHLORIDE SERPL-SCNC: 111 MMOL/L (ref 98–107)
CO2 SERPL-SCNC: 24 MMOL/L (ref 22–29)
COLOR UR AUTO: ABNORMAL
CREAT SERPL-MCNC: 0.88 MG/DL (ref 0.55–1.02)
CTP QC/QA: YES
EOSINOPHIL # BLD AUTO: 0.05 X10(3)/MCL (ref 0–0.9)
EOSINOPHIL NFR BLD AUTO: 0.8 %
ERYTHROCYTE [DISTWIDTH] IN BLOOD BY AUTOMATED COUNT: 11.7 % (ref 11.5–17)
GFR SERPLBLD CREATININE-BSD FMLA CKD-EPI: >60 MLS/MIN/1.73/M2
GLOBULIN SER-MCNC: 3 GM/DL (ref 2.4–3.5)
GLUCOSE SERPL-MCNC: 94 MG/DL (ref 74–100)
GLUCOSE UR QL STRIP.AUTO: NORMAL
HCT VFR BLD AUTO: 42.7 % (ref 37–47)
HGB BLD-MCNC: 14.3 G/DL (ref 12–16)
HOLD SPECIMEN: NORMAL
HOLD SPECIMEN: NORMAL
HYALINE CASTS #/AREA URNS LPF: ABNORMAL /LPF
IMM GRANULOCYTES # BLD AUTO: 0.02 X10(3)/MCL (ref 0–0.04)
IMM GRANULOCYTES NFR BLD AUTO: 0.3 %
KETONES UR QL STRIP.AUTO: NEGATIVE
LEUKOCYTE ESTERASE UR QL STRIP.AUTO: 25
LYMPHOCYTES # BLD AUTO: 1.7 X10(3)/MCL (ref 0.6–4.6)
LYMPHOCYTES NFR BLD AUTO: 25.8 %
MCH RBC QN AUTO: 31.5 PG (ref 27–31)
MCHC RBC AUTO-ENTMCNC: 33.5 G/DL (ref 33–36)
MCV RBC AUTO: 94.1 FL (ref 80–94)
MONOCYTES # BLD AUTO: 0.81 X10(3)/MCL (ref 0.1–1.3)
MONOCYTES NFR BLD AUTO: 12.3 %
MUCOUS THREADS URNS QL MICRO: ABNORMAL /LPF
NEUTROPHILS # BLD AUTO: 4 X10(3)/MCL (ref 2.1–9.2)
NEUTROPHILS NFR BLD AUTO: 60.5 %
NITRITE UR QL STRIP.AUTO: NEGATIVE
NRBC BLD AUTO-RTO: 0 %
PH UR STRIP.AUTO: 6.5 [PH]
PLATELET # BLD AUTO: 195 X10(3)/MCL (ref 130–400)
PMV BLD AUTO: 10.4 FL (ref 7.4–10.4)
POTASSIUM SERPL-SCNC: 4.3 MMOL/L (ref 3.5–5.1)
PROT SERPL-MCNC: 7.3 GM/DL (ref 6.4–8.3)
PROT UR QL STRIP.AUTO: ABNORMAL
RBC # BLD AUTO: 4.54 X10(6)/MCL (ref 4.2–5.4)
RBC #/AREA URNS AUTO: >100 /HPF
RBC UR QL AUTO: ABNORMAL
SODIUM SERPL-SCNC: 141 MMOL/L (ref 136–145)
SP GR UR STRIP.AUTO: 1.02 (ref 1–1.03)
SQUAMOUS #/AREA URNS LPF: ABNORMAL /HPF
UROBILINOGEN UR STRIP-ACNC: NORMAL
WBC # SPEC AUTO: 6.6 X10(3)/MCL (ref 4.5–11.5)
WBC #/AREA URNS AUTO: ABNORMAL /HPF
WBC CLUMPS UR QL AUTO: ABNORMAL
YEAST BUDDING URNS QL: ABNORMAL /HPF

## 2024-02-20 PROCEDURE — 87086 URINE CULTURE/COLONY COUNT: CPT | Performed by: PHYSICIAN ASSISTANT

## 2024-02-20 PROCEDURE — 85025 COMPLETE CBC W/AUTO DIFF WBC: CPT | Performed by: PHYSICIAN ASSISTANT

## 2024-02-20 PROCEDURE — 96372 THER/PROPH/DIAG INJ SC/IM: CPT | Performed by: PHYSICIAN ASSISTANT

## 2024-02-20 PROCEDURE — 81025 URINE PREGNANCY TEST: CPT | Performed by: PHYSICIAN ASSISTANT

## 2024-02-20 PROCEDURE — 81001 URINALYSIS AUTO W/SCOPE: CPT | Performed by: PHYSICIAN ASSISTANT

## 2024-02-20 PROCEDURE — 80053 COMPREHEN METABOLIC PANEL: CPT | Performed by: PHYSICIAN ASSISTANT

## 2024-02-20 PROCEDURE — 63600175 PHARM REV CODE 636 W HCPCS: Performed by: PHYSICIAN ASSISTANT

## 2024-02-20 PROCEDURE — 99284 EMERGENCY DEPT VISIT MOD MDM: CPT | Mod: 25

## 2024-02-20 RX ORDER — METHOCARBAMOL 100 MG/ML
500 INJECTION, SOLUTION INTRAMUSCULAR; INTRAVENOUS
Status: DISCONTINUED | OUTPATIENT
Start: 2024-02-20 | End: 2024-02-20 | Stop reason: HOSPADM

## 2024-02-20 RX ORDER — IBUPROFEN 800 MG/1
800 TABLET ORAL 3 TIMES DAILY
Qty: 21 TABLET | Refills: 0 | Status: SHIPPED | OUTPATIENT
Start: 2024-02-20 | End: 2024-02-27

## 2024-02-20 RX ORDER — KETOROLAC TROMETHAMINE 30 MG/ML
30 INJECTION, SOLUTION INTRAMUSCULAR; INTRAVENOUS
Status: COMPLETED | OUTPATIENT
Start: 2024-02-20 | End: 2024-02-20

## 2024-02-20 RX ADMIN — KETOROLAC TROMETHAMINE 30 MG: 30 INJECTION, SOLUTION INTRAMUSCULAR; INTRAVENOUS at 12:02

## 2024-02-20 NOTE — FIRST PROVIDER EVALUATION
Medical screening examination initiated.  I have conducted a focused provider triage encounter, findings are as follows:    Brief history of present illness:  Patient with hx of ovarian cysts, uterine fibroid, and dysmenorrhea presents today c/o increased pelvic pain and vaginal bleeding for 1 week.     There were no vitals filed for this visit.    Pertinent physical exam:  Vitals stable. In NAD.    Brief workup plan:  Labs    Preliminary workup initiated; this workup will be continued and followed by the physician or advanced practice provider that is assigned to the patient when roomed.

## 2024-02-20 NOTE — Clinical Note
"Ellis Villafana" Richard was seen and treated in our emergency department on 2/20/2024.  She may return to work on 02/22/2024.       If you have any questions or concerns, please don't hesitate to call.      Tavo Anderson MD"

## 2024-02-20 NOTE — ED PROVIDER NOTES
Encounter Date: 2/20/2024       History     Chief Complaint   Patient presents with    Vaginal Bleeding     PT REPORTS CONTINUED RLQ ABD PAIN W  VAG BLEEDING SINCE 2/14/24. PT REPORTS NEEDING D&C JUST NOT SCHED YET.       Patient reports to the emergency room with complaints of vaginal bleeding for the past 1 week; patient reports associated lower abdominal pain for the same amount of time; patient is currently being seen in gynecology clinic in a D&C has been discussed but has been unable to be scheduled at this point    The history is provided by the patient.   Vaginal Bleeding  The current episode started more than 1 week ago. The problem has not changed since onset.Associated symptoms include abdominal pain. Pertinent negatives include no chest pain, no headaches and no shortness of breath.     Review of patient's allergies indicates:   Allergen Reactions    Amoxicillin Dermatitis     Other reaction(s): Rash     Past Medical History:   Diagnosis Date    Abnormal Pap smear of cervix      Past Surgical History:   Procedure Laterality Date    CYST REMOVAL      On right foot     Family History   Problem Relation Age of Onset    Ovarian cancer Paternal Grandmother     Ovarian cancer Maternal Grandmother     Diabetes Maternal Aunt      Social History     Tobacco Use    Smoking status: Some Days     Types: Cigarettes, Vaping with nicotine     Passive exposure: Current    Smokeless tobacco: Current   Substance Use Topics    Alcohol use: Not Currently     Comment: Occasionally    Drug use: Never     Review of Systems   Constitutional:  Negative for fever.   HENT:  Negative for sore throat.    Eyes: Negative.    Respiratory:  Negative for shortness of breath.    Cardiovascular:  Negative for chest pain.   Gastrointestinal:  Positive for abdominal pain. Negative for nausea.   Genitourinary:  Positive for vaginal bleeding. Negative for dysuria.   Musculoskeletal:  Negative for back pain.   Skin:  Negative for rash.    Neurological:  Negative for weakness and headaches.   Hematological:  Does not bruise/bleed easily.   Psychiatric/Behavioral: Negative.         Physical Exam     Initial Vitals [02/20/24 0919]   BP Pulse Resp Temp SpO2   114/77 78 16 97.9 °F (36.6 °C) 100 %      MAP       --         Physical Exam    Vitals reviewed.  Constitutional: She appears well-developed and well-nourished.   HENT:   Head: Normocephalic and atraumatic.   Eyes: Conjunctivae and EOM are normal. Pupils are equal, round, and reactive to light.   Neck: Neck supple.   Normal range of motion.  Cardiovascular:  Normal rate, regular rhythm, normal heart sounds and intact distal pulses.           Pulmonary/Chest: Breath sounds normal. No respiratory distress. She has no wheezes. She exhibits no tenderness.   Abdominal: Abdomen is soft. Bowel sounds are normal. There is abdominal tenderness.   Tenderness with palpation as marked     There is no rebound, no guarding and negative Cervantes's sign. negative Rovsing's sign  Musculoskeletal:         General: Normal range of motion.      Cervical back: Normal range of motion and neck supple.     Neurological: She is alert and oriented to person, place, and time. She displays normal reflexes. No cranial nerve deficit or sensory deficit.   Skin: Skin is warm and dry.   Psychiatric: She has a normal mood and affect. Her behavior is normal. Judgment and thought content normal.         ED Course   Procedures  Labs Reviewed   COMPREHENSIVE METABOLIC PANEL - Abnormal; Notable for the following components:       Result Value    Chloride 111 (*)     All other components within normal limits   URINALYSIS, REFLEX TO URINE CULTURE - Abnormal; Notable for the following components:    Color, UA Light-Orange (*)     Appearance, UA Turbid (*)     Protein, UA 1+ (*)     Blood, UA 3+ (*)     Leukocyte Esterase, UA 25 (*)     WBC, UA 21-50 (*)     WBC Clumps, UA Moderate (*)     Budding Yeast, UA Few (*)     Squamous Epithelial  Cells, UA Few (*)     Mucous, UA Few (*)     Calcium Oxalate Crystals, UA Few (*)     RBC, UA >100 (*)     All other components within normal limits   CBC WITH DIFFERENTIAL - Abnormal; Notable for the following components:    MCV 94.1 (*)     MCH 31.5 (*)     All other components within normal limits   CULTURE, URINE   CBC W/ AUTO DIFFERENTIAL    Narrative:     The following orders were created for panel order CBC auto differential.  Procedure                               Abnormality         Status                     ---------                               -----------         ------                     CBC with Differential[[id number]]       Abnormal            Final result                 Please view results for these tests on the individual orders.   EXTRA TUBES    Narrative:     The following orders were created for panel order EXTRA TUBES.  Procedure                               Abnormality         Status                     ---------                               -----------         ------                     Light Blue Top Hold[[id number]]                             Final result               Gold Top Hold[[id number]]                                   Final result                 Please view results for these tests on the individual orders.   LIGHT BLUE TOP HOLD   GOLD TOP HOLD   POCT URINE PREGNANCY          Imaging Results              US Pelvis Comp with Transvag NON-OB (xpd (Final result)  Result time 02/20/24 12:10:49      Final result by Adwoa Sim MD (02/20/24 12:10:49)                   Impression:      Mildly heterogeneous, thickened endometrium.      Electronically signed by: Adwoa Sim  Date:    02/20/2024  Time:    12:10               Narrative:    EXAMINATION:  US PELVIS COMP WITH TRANSVAG NON-OB (XPD)    CLINICAL HISTORY:  vaginal bleeding / right sided pain;    TECHNIQUE:  Transabdominal sonography of the pelvis was performed, followed by transvaginal sonography to better  evaluate the uterus and ovaries.    COMPARISON:  12/22/2023    FINDINGS:  UTERUS/CERVIX:    Size: 7.4 x 4.4 x 3.9 cm    Masses: None    Endometrium: 14 mm, mildly heterogeneous.    RIGHT OVARY:    Size: 3 x 2.4 x 2.1 cm    Appearance: Dominant follicle measures 1.9 cm.    Vascular flow: Normal spectral waveforms.    LEFT OVARY:    Attempted visualization of the left ovary.  Left ovary not seen for unknown reason, possibly due to shadowing bowel gas and/or body habitus.    FREE FLUID:    None                                       Medications   ketorolac injection 30 mg (30 mg Intramuscular Given 2/20/24 1217)     Medical Decision Making  Amount and/or Complexity of Data Reviewed  Radiology: ordered.  Discussion of management or test interpretation with external provider(s): Discussed with Gynecology on-call Dr. Roach - she recommended an ultrasound be performed in the emergency room and call back with results    Risk  Prescription drug management.  Risk Details: Given strict ED return precautions. I have spoken with the patient and/or caregivers. I have explained the patient's condition, diagnoses and treatment plan based on the information available to me at this time. I have answered the patient's and/or caregiver's questions and addressed any concerns. The patient and/or caregivers have as good an understanding of the patient's diagnosis, condition and treatment plan as can be expected at this point. The vital signs have been stable. The patient's condition is stable and appropriate for discharge from the emergency department.      The patient will pursue further outpatient evaluation with the primary care physician or other designated or consulting physician as outlined in the discharge instructions. The patient and/or caregivers are agreeable to this plan of care and follow-up instructions have been explained in detail. The patient and/or caregivers have received these instructions in written format and have  expressed an understanding of the discharge instructions. The patient and/or caregivers are aware that any significant change in condition or worsening of symptoms should prompt an immediate return to this or the closest emergency department or a call to 911.               ED Course as of 24e 2024   1230 Informed gyn on-call who secure chat that ultrasound results had returned [AL]   1355 Still awaiting gyn on-call to provider recommendation based on today's ultrasound results [AL]      ED Course User Index  [AL] Howard Haynes PA                           Clinical Impression:  Final diagnoses:  [N93.9] Vaginal bleeding (Primary)          ED Disposition Condition    Discharge Stable          ED Prescriptions       Medication Sig Dispense Start Date End Date Auth. Provider    ibuprofen (ADVIL,MOTRIN) 800 MG tablet () Take 1 tablet (800 mg total) by mouth 3 (three) times daily. for 7 days 21 tablet 2024 Howard Haynes PA          Follow-up Information       Follow up With Specialties Details Why Contact Info    Lida Hoyos NP [facility], Emergency Medicine   [address] [place] [address]  [phone]      discharge followup    If your symptoms become WORSE or you DO NOT IMPROVE and you are unable to reach your health care provider, you should RETURN to the emergency department    discharge info    Discussed all pertinent ED information, results, diagnosis and treatment plan; All questions and concerns were addressed at this time. Patient voices understanding of information and instructions. Patient is comfortable with plan and discharge             Tavo Anderson MD  24

## 2024-02-22 LAB — BACTERIA UR CULT: NORMAL

## 2024-02-23 ENCOUNTER — TELEPHONE (OUTPATIENT)
Dept: GYNECOLOGY | Facility: CLINIC | Age: 29
End: 2024-02-23
Payer: COMMERCIAL

## 2024-02-23 NOTE — TELEPHONE ENCOUNTER
Good Afternoon,     There have been a lot of missed called between  and pt to get her a Sx date.   She awaits by phone for another call, even if it is on the weekend.  # [phone]   or   [phone].     Thank You, Nory

## 2024-02-25 ENCOUNTER — TELEPHONE (OUTPATIENT)
Dept: GYNECOLOGY | Facility: CLINIC | Age: 29
End: 2024-02-25
Payer: COMMERCIAL

## 2024-02-25 ENCOUNTER — TELEPHONE (OUTPATIENT)
Dept: OBSTETRICS AND GYNECOLOGY | Facility: HOSPITAL | Age: 29
End: 2024-02-25
Payer: COMMERCIAL

## 2024-02-25 ENCOUNTER — HOSPITAL ENCOUNTER (EMERGENCY)
Facility: HOSPITAL | Age: 29
Discharge: HOME | End: 2024-02-25
Attending: STUDENT IN AN ORGANIZED HEALTH CARE EDUCATION/TRAINING PROGRAM
Payer: COMMERCIAL

## 2024-02-25 VITALS
BODY MASS INDEX: 24.1 KG/M2 | RESPIRATION RATE: 18 BRPM | OXYGEN SATURATION: 99 % | TEMPERATURE: 98 F | SYSTOLIC BLOOD PRESSURE: 118 MMHG | DIASTOLIC BLOOD PRESSURE: 74 MMHG | HEART RATE: 98 BPM | HEIGHT: 66 IN | WEIGHT: 149.94 LBS

## 2024-02-25 DIAGNOSIS — N80.9 ENDOMETRIOSIS, UNSPECIFIED: ICD-10-CM

## 2024-02-25 DIAGNOSIS — R10.2 PELVIC PAIN: Primary | ICD-10-CM

## 2024-02-25 LAB
ALBUMIN SERPL-MCNC: 4.2 G/DL (ref 3.5–5)
ALBUMIN/GLOB SERPL: 1.4 RATIO (ref 1.1–2)
ALP SERPL-CCNC: 52 UNIT/L (ref 40–150)
ALT SERPL-CCNC: 9 UNIT/L (ref 0–55)
APPEARANCE UR: CLEAR
AST SERPL-CCNC: 13 UNIT/L (ref 5–34)
B-HCG UR QL: NEGATIVE
BACTERIA #/AREA URNS AUTO: ABNORMAL /HPF
BASOPHILS # BLD AUTO: 0.02 X10(3)/MCL
BASOPHILS NFR BLD AUTO: 0.4 %
BILIRUB SERPL-MCNC: 0.4 MG/DL
BILIRUB UR QL STRIP.AUTO: NEGATIVE
BUN SERPL-MCNC: 7 MG/DL (ref 7–18.7)
CALCIUM SERPL-MCNC: 9.2 MG/DL (ref 8.4–10.2)
CAOX CRY URNS QL MICRO: ABNORMAL /HPF
CHLORIDE SERPL-SCNC: 110 MMOL/L (ref 98–107)
CO2 SERPL-SCNC: 25 MMOL/L (ref 22–29)
COLOR UR AUTO: ABNORMAL
CREAT SERPL-MCNC: 0.95 MG/DL (ref 0.55–1.02)
CTP QC/QA: YES
EOSINOPHIL # BLD AUTO: 0.07 X10(3)/MCL (ref 0–0.9)
EOSINOPHIL NFR BLD AUTO: 1.3 %
ERYTHROCYTE [DISTWIDTH] IN BLOOD BY AUTOMATED COUNT: 11.9 % (ref 11.5–17)
GFR SERPLBLD CREATININE-BSD FMLA CKD-EPI: >60 MLS/MIN/1.73/M2
GLOBULIN SER-MCNC: 2.9 GM/DL (ref 2.4–3.5)
GLUCOSE SERPL-MCNC: 121 MG/DL (ref 74–100)
GLUCOSE UR QL STRIP.AUTO: NORMAL
HCT VFR BLD AUTO: 45.2 % (ref 37–47)
HGB BLD-MCNC: 15.1 G/DL (ref 12–16)
HOLD SPECIMEN: NORMAL
HYALINE CASTS #/AREA URNS LPF: ABNORMAL /LPF
IMM GRANULOCYTES # BLD AUTO: 0.01 X10(3)/MCL (ref 0–0.04)
IMM GRANULOCYTES NFR BLD AUTO: 0.2 %
KETONES UR QL STRIP.AUTO: NEGATIVE
LEUKOCYTE ESTERASE UR QL STRIP.AUTO: NEGATIVE
LYMPHOCYTES # BLD AUTO: 1.64 X10(3)/MCL (ref 0.6–4.6)
LYMPHOCYTES NFR BLD AUTO: 31.2 %
MCH RBC QN AUTO: 31.7 PG (ref 27–31)
MCHC RBC AUTO-ENTMCNC: 33.4 G/DL (ref 33–36)
MCV RBC AUTO: 95 FL (ref 80–94)
MONOCYTES # BLD AUTO: 0.32 X10(3)/MCL (ref 0.1–1.3)
MONOCYTES NFR BLD AUTO: 6.1 %
MUCOUS THREADS URNS QL MICRO: ABNORMAL /LPF
NEUTROPHILS # BLD AUTO: 3.19 X10(3)/MCL (ref 2.1–9.2)
NEUTROPHILS NFR BLD AUTO: 60.8 %
NITRITE UR QL STRIP.AUTO: ABNORMAL
NRBC BLD AUTO-RTO: 0 %
PH UR STRIP.AUTO: 7 [PH]
PLATELET # BLD AUTO: 219 X10(3)/MCL (ref 130–400)
PMV BLD AUTO: 10.9 FL (ref 7.4–10.4)
POTASSIUM SERPL-SCNC: 3.6 MMOL/L (ref 3.5–5.1)
PROT SERPL-MCNC: 7.1 GM/DL (ref 6.4–8.3)
PROT UR QL STRIP.AUTO: ABNORMAL
RBC # BLD AUTO: 4.76 X10(6)/MCL (ref 4.2–5.4)
RBC #/AREA URNS AUTO: ABNORMAL /HPF
RBC UR QL AUTO: ABNORMAL
SODIUM SERPL-SCNC: 142 MMOL/L (ref 136–145)
SP GR UR STRIP.AUTO: 1.02 (ref 1–1.03)
SQUAMOUS #/AREA URNS LPF: ABNORMAL /HPF
UROBILINOGEN UR STRIP-ACNC: NORMAL
WBC # SPEC AUTO: 5.25 X10(3)/MCL (ref 4.5–11.5)
WBC #/AREA URNS AUTO: ABNORMAL /HPF

## 2024-02-25 PROCEDURE — 63600175 PHARM REV CODE 636 W HCPCS: Performed by: PHYSICIAN ASSISTANT

## 2024-02-25 PROCEDURE — 85025 COMPLETE CBC W/AUTO DIFF WBC: CPT | Performed by: PHYSICIAN ASSISTANT

## 2024-02-25 PROCEDURE — 96372 THER/PROPH/DIAG INJ SC/IM: CPT | Performed by: PHYSICIAN ASSISTANT

## 2024-02-25 PROCEDURE — 80053 COMPREHEN METABOLIC PANEL: CPT | Performed by: PHYSICIAN ASSISTANT

## 2024-02-25 PROCEDURE — 81025 URINE PREGNANCY TEST: CPT | Performed by: PHYSICIAN ASSISTANT

## 2024-02-25 PROCEDURE — 99283 EMERGENCY DEPT VISIT LOW MDM: CPT

## 2024-02-25 PROCEDURE — 81001 URINALYSIS AUTO W/SCOPE: CPT | Performed by: PHYSICIAN ASSISTANT

## 2024-02-25 PROCEDURE — 25000003 PHARM REV CODE 250: Performed by: PHYSICIAN ASSISTANT

## 2024-02-25 RX ORDER — HYDROCODONE BITARTRATE AND ACETAMINOPHEN 7.5; 325 MG/1; MG/1
1 TABLET ORAL
Status: DISCONTINUED | OUTPATIENT
Start: 2024-02-25 | End: 2024-02-25 | Stop reason: HOSPADM

## 2024-02-25 RX ORDER — KETOROLAC TROMETHAMINE 10 MG/1
10 TABLET, FILM COATED ORAL EVERY 6 HOURS PRN
Qty: 20 TABLET | Refills: 0 | Status: SHIPPED | OUTPATIENT
Start: 2024-02-25 | End: 2024-03-01

## 2024-02-25 RX ORDER — KETOROLAC TROMETHAMINE 30 MG/ML
30 INJECTION, SOLUTION INTRAMUSCULAR; INTRAVENOUS
Status: DISCONTINUED | OUTPATIENT
Start: 2024-02-25 | End: 2024-02-25 | Stop reason: HOSPADM

## 2024-02-25 NOTE — TELEPHONE ENCOUNTER
Called pt to discuss pelvic pain. Pt seen in ED twice for pelvic pain in past 2 days, chronic in nature. Upon further chart review discussed with patient may have endometriosis given deviated uterus without hx pelvic surgery. Pelvic MRI ordered for further evaluation, also discussed may need dx laparoscopy for further evaluation of pain. Pt amenable to this plan, all questions answered.     Luh Dolan MD, MPH  [facility], PGY4

## 2024-02-25 NOTE — Clinical Note
"Ellis Ramos (Bre) was seen and treated in our emergency department on 2/25/2024.  She may return to work on 02/28/2024.       If you have any questions or concerns, please don't hesitate to call.       RN    "
"Ellis Ramos (Bre) was seen and treated in our emergency department on 2/25/2024.  She may return to work on 02/28/2024.       If you have any questions or concerns, please don't hesitate to call.       RN    "
Peripheral Venous

## 2024-02-25 NOTE — ED PROVIDER NOTES
Encounter Date: 2/25/2024       History     Chief Complaint   Patient presents with    Flank Pain     Pt reports right sided flank pain with increase in urinary frequency.      Ellis Ramos is a 28 y.o. female with a history of chronic right sided pelvic pain, insertional dyspareunia, and endometrial thickening who presents to the ED complaining of right pelvic/flank pain. Patient states this pain has been ongoing for months and remains unchanged. She has been seen by gynecology and states it was recommended she have a D&C and endometrial biopsy. She has been unable to get in contact with anyone in clinic and the procedure has still not been scheduled. She states she started her menstrual cycle on 2/14 and has continued to bleed despite being on Depo-provera for menstrual suppression. She was seen in this ED on Tuesday where she had labs and ultrasound performed. She was given toradol with no improvement in pain. She denies fevers, chills, abdominal pain, constipation, N/V/D.     The history is provided by the patient.     Review of patient's allergies indicates:   Allergen Reactions    Amoxicillin Dermatitis     Other reaction(s): Rash     Past Medical History:   Diagnosis Date    Abnormal Pap smear of cervix      Past Surgical History:   Procedure Laterality Date    CYST REMOVAL      On right foot     Family History   Problem Relation Age of Onset    Ovarian cancer Paternal Grandmother     Ovarian cancer Maternal Grandmother     Diabetes Maternal Aunt      Social History     Tobacco Use    Smoking status: Some Days     Types: Cigarettes, Vaping with nicotine     Passive exposure: Current    Smokeless tobacco: Current   Substance Use Topics    Alcohol use: Not Currently     Comment: Occasionally    Drug use: Never     Review of Systems   Constitutional:  Negative for chills and fever.   HENT:  Negative for congestion and sore throat.    Eyes:  Negative for redness and itching.   Respiratory:  Negative for cough  and shortness of breath.    Cardiovascular:  Negative for chest pain.   Gastrointestinal:  Negative for abdominal pain, nausea and vomiting.   Genitourinary:  Positive for pelvic pain and vaginal bleeding. Negative for dysuria and frequency.   Musculoskeletal:  Negative for arthralgias and back pain.   Skin:  Negative for rash and wound.   Neurological:  Negative for weakness.   Hematological:  Does not bruise/bleed easily.       Physical Exam     Initial Vitals [02/25/24 0910]   BP Pulse Resp Temp SpO2   118/74 98 16 98.2 °F (36.8 °C) 99 %      MAP       --         Physical Exam    Nursing note and vitals reviewed.  Constitutional: She appears well-developed and well-nourished. No distress.   HENT:   Head: Normocephalic and atraumatic.   Mouth/Throat: No oropharyngeal exudate.   Eyes: EOM are normal. No scleral icterus.   Neck: Neck supple.   Normal range of motion.  Cardiovascular:  Normal rate and regular rhythm.           No murmur heard.  Pulmonary/Chest: Breath sounds normal. No respiratory distress. She has no wheezes.   Abdominal: Abdomen is soft. Bowel sounds are normal. She exhibits no distension. There is abdominal tenderness.       Musculoskeletal:         General: No tenderness. Normal range of motion.      Cervical back: Normal range of motion and neck supple.     Neurological: She is alert and oriented to person, place, and time. No cranial nerve deficit.   Skin: Skin is warm and dry. Capillary refill takes less than 2 seconds. No erythema.   Psychiatric: She has a normal mood and affect. Her behavior is normal. Judgment and thought content normal.         ED Course   Procedures  Labs Reviewed   COMPREHENSIVE METABOLIC PANEL - Abnormal; Notable for the following components:       Result Value    Chloride 110 (*)     Glucose Level 121 (*)     All other components within normal limits   CBC WITH DIFFERENTIAL - Abnormal; Notable for the following components:    MCV 95.0 (*)     MCH 31.7 (*)     MPV 10.9  (*)     All other components within normal limits   CBC W/ AUTO DIFFERENTIAL    Narrative:     The following orders were created for panel order CBC auto differential.  Procedure                               Abnormality         Status                     ---------                               -----------         ------                     CBC with Differential[[id number]]       Abnormal            Final result                 Please view results for these tests on the individual orders.   URINALYSIS, REFLEX TO URINE CULTURE   EXTRA TUBES    Narrative:     The following orders were created for panel order EXTRA TUBES.  Procedure                               Abnormality         Status                     ---------                               -----------         ------                     Light Blue Top Hold[[id number]]                             In process                 Gold Top Hold[[id number]]                                   In process                 Pink Top Hold[[id number]]                                   In process                   Please view results for these tests on the individual orders.   LIGHT BLUE TOP HOLD   GOLD TOP HOLD   PINK TOP HOLD   POCT URINE PREGNANCY          Imaging Results    None          Medications   HYDROcodone-acetaminophen 7.5-325 mg per tablet 1 tablet (1 tablet Oral Not Given 2/25/24 1002)   ketorolac injection 30 mg (30 mg Intramuscular Not Given 2/25/24 1013)     Medical Decision Making  Differential: chronic pelvic pain, UTI, among others    ED management: pt HDS and afebrile. Resting comfortably in NAD. Mild right pelvic TTP without rebound or guarding. No CVA tenderness. H/H stable at baseline. On chart review pt has had visits dating back to 2019 for right pelvic pain. She has had multiple CT scans and ultrasounds, all without acute process however recent findings of endometrial thickening. She follows with gynecology and there is a plan for D&C for further  evaluation. Pt is frustrated that this has not been scheduled. I contacted gyn on call, Dr. Dolan, and she called patient to discuss further. Labs unremarkable today. Pt had US on Tuesday of this past week with stable findings. She does not have acute change in symptoms to warrant repeat imaging today. I offered her norco due to no improvement with toradol on last ED visit and she refused. Requesting discharge with something to take prn. Will prescribe toradol. Discussed that she should discontinue immediately if she were to become pregnant. Hold other NSAIDs while taking and take with food. Plan for outpatient MRI per gyn and follow up in clinic for results/further management. Pt is happy and agreeable with this plan. ED return precautions given. She verbalized understanding. All questions answered.     Amount and/or Complexity of Data Reviewed  Labs: ordered.    Risk  Prescription drug management.               ED Course as of 02/25/24 1053   Sun Feb 25, 2024   1000 Discussed with Dr. Dolan, gyn on call. She is familiar with patient and will notify clinic to contact her this week to schedule follow up appointment. [KD]      ED Course User Index  [KD] Jenniffer Jones PA-C                           Clinical Impression:  Final diagnoses:  [R10.2] Pelvic pain (Primary)          ED Disposition Condition    Discharge Stable          ED Prescriptions    None       Follow-up Information       Follow up With Specialties Details Why Contact Info    [facility] Emergency Medicine  If symptoms worsen [address] [id number] [phone]    [facility] Gynecology Schedule an appointment as soon as possible for a visit   [address] [id number] [phone]             Jenniffer Jones PA-C  02/25/24 6122

## 2024-02-25 NOTE — TELEPHONE ENCOUNTER
Called patient to set fupf or pre-op. Will set up for  pre-op appointment on 3/13, with surgery date on 3/21.    Juwan Guerrero MD PGY3  Obstetrics & Gynecology

## 2024-03-13 ENCOUNTER — HOSPITAL ENCOUNTER (OUTPATIENT)
Dept: RADIOLOGY | Facility: HOSPITAL | Age: 29
Discharge: HOME | End: 2024-03-13
Attending: STUDENT IN AN ORGANIZED HEALTH CARE EDUCATION/TRAINING PROGRAM
Payer: COMMERCIAL

## 2024-03-13 ENCOUNTER — CLINICAL SUPPORT (OUTPATIENT)
Dept: GYNECOLOGY | Facility: CLINIC | Age: 29
End: 2024-03-13
Attending: STUDENT IN AN ORGANIZED HEALTH CARE EDUCATION/TRAINING PROGRAM
Payer: COMMERCIAL

## 2024-03-13 DIAGNOSIS — N93.9 ABNORMAL UTERINE BLEEDING: ICD-10-CM

## 2024-03-13 DIAGNOSIS — R10.2 PELVIC PAIN: Primary | ICD-10-CM

## 2024-03-13 DIAGNOSIS — R10.2 PELVIC PAIN: ICD-10-CM

## 2024-03-13 DIAGNOSIS — N94.6 DYSMENORRHEA: ICD-10-CM

## 2024-03-13 LAB — B-HCG SERPL QL: NEGATIVE

## 2024-03-13 PROCEDURE — 25500020 PHARM REV CODE 255

## 2024-03-13 PROCEDURE — 72197 MRI PELVIS W/O & W/DYE: CPT | Mod: TC

## 2024-03-13 PROCEDURE — A9577 INJ MULTIHANCE: HCPCS

## 2024-03-13 PROCEDURE — 81025 URINE PREGNANCY TEST: CPT | Performed by: STUDENT IN AN ORGANIZED HEALTH CARE EDUCATION/TRAINING PROGRAM

## 2024-03-13 RX ADMIN — GADOBENATE DIMEGLUMINE 14 ML: 529 INJECTION, SOLUTION INTRAVENOUS at 09:03

## 2024-03-13 NOTE — ASSESSMENT & PLAN NOTE
Pt with heavy bleeding despite depo provera, TVUS notable for thickened heterogenous EMS, possible endometrial polyp. Discussed will do HSC D&C, possible polypectomy at time of surgery to further investigate bleeding on depo provera

## 2024-03-13 NOTE — ASSESSMENT & PLAN NOTE
Pt with hx dyspareunia, dysmenorrhea, dyschezia, all concerning for endometriosis. Discussed will evaluate via diagnostic laparoscopy, if endometriosis noted will biopsy and excise as much as feasible. Discussed surgery unlikely to alleviate pain but will help serve diagnostic purpose.

## 2024-03-13 NOTE — PROGRESS NOTES
[facility] Progress Note    Primary Site: [facility]  Patient location: Home  Physician location: In office      Chief Complaint: pelvic pain    HPI  Ellis Ramos joined me via our telemedicine platform.    I have reviewed family history, social history, medications and allergies as documented in the patient's electronic medical record.    Reports, labs, outside records, and images have been reviewed with pertinent interpretations below. See telemedicine notes records for intervening communications.    29 yo G0 with long standing hx pelvic pain and dysmenorrhea refractory to NSAIDs. Pt denies improvement in pain since ED visit despite scheduled NSAIDs. MRI pelvis done today, notable for deviated uterus, no endometrial implants noted on my review of images but final read for MRI not yet done.     Assessment/Plan  Problem List Items Addressed This Visit          Renal/    Dysmenorrhea     See pelvic pain         Abnormal uterine bleeding     Pt with heavy bleeding despite depo provera, TVUS notable for thickened heterogenous EMS, possible endometrial polyp. Discussed will do HSC D&C, possible polypectomy at time of surgery to further investigate bleeding on depo provera            GI    Pelvic pain - Primary     Pt with hx dyspareunia, dysmenorrhea, dyschezia, all concerning for endometriosis. Discussed will evaluate via diagnostic laparoscopy, if endometriosis noted will biopsy and excise as much as feasible. Discussed surgery unlikely to alleviate pain but will help serve diagnostic purpose.             See correspondence above for plan.   Patient's needs assessed and health education provided. Patient understands risks, benefits, and alternatives of treatment prescribed above. Patient verbalizes understanding and agrees to follow plan.    Patient's identity was confirmed and confidentiality/privacy confirmed prior to visit. I certify that this visit was done via secure two-way transmission with  informed consent of the patient and/or guardian.  Each patient to whom I provide medical services by telemedicine is:  (1) informed of the relationship between the physician and patient and the respective role of any other health care provider with respect to management of the patient; and (2) notified that they may decline to receive medical services by telemedicine and may withdraw from such care at any time. Patient verbally consented to receive this service via voice-only telephone call.    Audio only was selected because there was no capability for video conferencing with patient.      100% of the time was counseling or coordinating care.  Start Time: 2:45pm  End Time: 3:01pm    Luh Dolan MD, MPH  [facility], PGY4

## 2024-03-14 ENCOUNTER — TELEPHONE (OUTPATIENT)
Dept: GYNECOLOGY | Facility: CLINIC | Age: 29
End: 2024-03-14

## 2024-03-14 ENCOUNTER — CLINICAL SUPPORT (OUTPATIENT)
Dept: GYNECOLOGY | Facility: CLINIC | Age: 29
End: 2024-03-14
Payer: COMMERCIAL

## 2024-03-14 DIAGNOSIS — N93.9 ABNORMAL UTERINE BLEEDING: Primary | ICD-10-CM

## 2024-03-14 PROCEDURE — 96372 THER/PROPH/DIAG INJ SC/IM: CPT | Mod: PBBFAC

## 2024-03-14 RX ADMIN — MEDROXYPROGESTERONE ACETATE 150 MG: 150 INJECTION, SUSPENSION INTRAMUSCULAR at 10:03

## 2024-03-14 NOTE — TELEPHONE ENCOUNTER
She came to me today asking if we had a SX date yet.  She is capable of any day asap.    Thank You, Nory

## 2024-03-14 NOTE — PROGRESS NOTES
Depo-Provera injection administered per Heather Louise NP order. Patient tolerated well and left in stable condition.

## 2024-03-17 ENCOUNTER — TELEPHONE (OUTPATIENT)
Dept: GYNECOLOGY | Facility: CLINIC | Age: 29
End: 2024-03-17
Payer: COMMERCIAL

## 2024-03-17 DIAGNOSIS — N93.9 ABNORMAL UTERINE BLEEDING (AUB): Primary | ICD-10-CM

## 2024-03-17 DIAGNOSIS — R10.2 PELVIC PAIN: ICD-10-CM

## 2024-03-17 DIAGNOSIS — N84.0 POLYP OF ENDOMETRIUM: ICD-10-CM

## 2024-03-17 NOTE — TELEPHONE ENCOUNTER
----- Message from Juwan Guerrero MD sent at 3/17/2024  9:22 AM CDT -----  Regarding: Pre-op  Hello,  This lady is going for HSC D&C< polypectomy, and diagnostic lap for AUB-P and pelvic pain. Can we get her set up for pre-op on 4/3.  Juwan Guerrero MD PGY3  Obstetrics & Gynecology

## 2024-03-17 NOTE — TELEPHONE ENCOUNTER
Called Ms. Ramos for surgical planning for AUB-P and pelvic pain for HSC D&C and diagnostic lap.  Message sent for pre-op on 4/3.  Case request placed for surgery on 4/11.    Juwan Guerrero MD PGY3  Obstetrics & Gynecology

## 2024-03-26 NOTE — PROGRESS NOTES
[facility] Preoperative Visit History and Physical    HPI:   28 y.o. G0 with a history of uterine fibroid, b/l ovarian cysts, dysmenorrhea, and R pelvic pain.     Right-sided Pelvic pain   Onset was 2023, that is sharp and throbbing that radiates to the front and towards the back, however pain has present since about 13  years, when she had a cyst rupture and reports preseence of pelvic pain since  That's pelvic pain occurs 2-3x weekly that lasts about 2 hours every morning; nothing makes the pain worse but improved with lying down   Reports pain with intercourse upon insertion and deep penetration. She is unable to finish due to pain. Rpeorts is not sexually active due to pelvic pain and AUB  Has only tried OTC Tylenol and ibuprofen but minimal pain relief. Toradol had helped for a short period of time, but overtime worsended while on it.    Pt was seen previously in clinic on 23  Repeat TVUS showed Thickened, heterogeneous endometrium with increased vascularity; uterine fibroid measuring 1 cm, Heterogeneous endometrium measuring 1.5 cm in thickness with increased vascularity within the endometrium  Previous TVUS on 22 - showed Endometrial stripe measures approximately 9 mm with some ill-defined areas and with some areas of increased vascularity   Despite patient being on Depo since 2023 (last given on 3/14/24) Reports contiued prolonged heavy cycles  Patient with initial discussion of possible hysteroscopy D&C, however new imaging with MRI demonstrates normal EMS.    Denies fever, chills, difficulty with urination, change in bowel habits/appetite, but admits to pelvic pain     GynHx:  [facility]: ;   LMP: 24-3/14/2024  Contraception: Depo Provera   Denies STIs/abnormal paps    ObHx:  OB History          0    Para   0    Term   0       0    AB   0    Living   0         SAB   0    IAB   0    Ectopic   0    Multiple   0    Live Births   0               PMH:  Past Medical  "History:   Diagnosis Date    Abnormal Pap smear of cervix      SurgHx:  Past Surgical History:   Procedure Laterality Date    CYST REMOVAL      On right foot     FamHx:  Family History   Problem Relation Age of Onset    Ovarian cancer Paternal Grandmother     Ovarian cancer Maternal Grandmother     Diabetes Maternal Aunt      Denies history of breast, endometrial, colon cancers.    SocialHx:  Social History     Socioeconomic History    Marital status: Single   Tobacco Use    Smoking status: Some Days     Types: Cigarettes, Vaping with nicotine     Passive exposure: Current    Smokeless tobacco: Current   Substance and Sexual Activity    Alcohol use: Not Currently     Comment: Occasionally    Drug use: Never    Sexual activity: Yes     Partners: Male     Birth control/protection: None   Occasional alcohol use  Denies tobacco/illicit drug use.    Works at [employer] in the [employer].  She lives with my girlfriend and feels safe at home.  She will be staying with her mother for recovery.    All:  Review of patient's allergies indicates:   Allergen Reactions    Amoxicillin Dermatitis     Other reaction(s): Rash     Meds:  Prior to Admission medications    Not on File     Review of Systems: As stated in HPI.      Objective:     Vitals:    03/27/24 1445   BP: 110/70   Pulse: 88   Resp: 12   Temp: 97.9 °F (36.6 °C)   SpO2: 100%   Weight: 68.5 kg (151 lb)   Height: 5' 6" (1.676 m)     Body mass index is 24.37 kg/m².    PHYSICAL EXAM  GEN: NAD, A&O x3  CV: RRR   LUNGS: CTABL  ABDOMEN: soft, ND, no masses, mild tenderness in RLQ with positive Carnett's sign no SI joint tenderness  INCISIONS: none  VAGINA: Moist, no lesions or masses  VULVA: Normal external genitalia, decreased sensation on left side at the level of the mons pubis and inner thigh  CERVIX: Well visualized without any masses or lesions. No CMT. Os normal in appearance without bleeding or discharge.   UTERUS: 8 cm in size, mobile, not broad, smooth in " contour  ADNEXA: No fullness, masses or tenderness  PELVIC FLOOR: levator ani ttp, obturator internus nttp, piriformis mm ttp; urethra  nttp; bladder neck ttp    LABS:  Last mammogram: N/A  Last pap: N/A  EMB: N/A  Colonoscopy: N/A    IMAGING:  MRI 2/25/2024  FINDINGS:  Uterus measures 8.5 x 4.5 x 4.8 cm.  There is a focus of increased T1 signal at the anterior inferior fundus best seen on image 20 series 11.  This could represent a small endometrioma..  There is a possible small fibroid anteriorly measuring 9 mm.  Endometrial stripe is normal.  Junctional zone normal.     There is a simple appearing thin walled left ovarian cyst measuring 2.1 cm with fluid signal.  Adnexa otherwise demonstrate a normal appearance.     No pelvic mass or lymphadenopathy.     Visualized bowel loops, rectum, bladder normal.     Musculoskeletal structures unremarkable.     No abnormal enhancement.     Impression:     Nodular focus of increased T1 signal measuring approximately 9 mm rising from the inferior aspect of the fundus which could represent a small uterine margin endometrioma versus a small subserosal fibroid.     Suspected small anterior mid myometrial fibroid.     Simple appearing left ovarian cyst, 2.1 cm, a benign incidental finding.        Electronically signed by: Kary Park MD  Date:                                            03/15/2024  Time:                                           10:03    TVUS 2/20/2024     FINDINGS:  UTERUS/CERVIX:     Size: 7.4 x 4.4 x 3.9 cm  Masses: None  Endometrium: 14 mm, mildly heterogeneous.    RIGHT OVARY:  Size: 3 x 2.4 x 2.1 cm  Appearance: Dominant follicle measures 1.9 cm.  Vascular flow: Normal spectral waveforms.     LEFT OVARY:  Attempted visualization of the left ovary.  Left ovary not seen for unknown reason, possibly due to shadowing bowel gas and/or body habitus.     FREE FLUID:  None     Impression:  Mildly heterogeneous, thickened endometrium.    Electronically signed by:  Adwoa Roney  Date:                                            2024  Time:                                           12:10    Assessment:      28 y.o.  with AUB and pelvic pain for surgical management.  1. Pelvic pain  Wet Prep, Genital    Urinalysis, Reflex to Urine Culture    Case Request Operating Room: LAPAROSCOPY, DIAGNOSTIC, CYSTOSCOPY, CHROMOTUBATION, OVIDUCT, DESTRUCTION, ENDOMETRIOSIS      2. Cervical cancer screening  Liquid-Based Pap Smear, Screening Screening      3. Preop testing  Type & Screen      4. Myalgia of pelvic floor        5. Adenomyosis        6. Fertility testing        7. Bladder pain            Plan:     Pelvic pain  Discussed with patient pain is multifactorial:  - Suspected endometriosis: Discussed MRI read with evidence suspicious for presence of endometriosis at the posterior aspect of the uterus, and uterine deviation to patient's right. Discussed recommend diagnostic lap to further evaluate presence of endometriosis, with opportunity to remove any visible suspected endometriotic lesions, however discussed in those case 30% of pts with endometriosis s/p diagnostic lap about 30% will improve, 30% will have no difference to pain, and 30 % will worsen. Discussed possibility of scars that can be taken down at time of surgery although minimal evidence of scar tissue noted on MRI.  Patient with decreased sensation on left side at the level of the mons pubis and inner thigh. Discussed may be endometriotic lesion affecting nerve, plan to pay close attention to anterior abdominal wall.  - Pelvic floor myalgia: see assessment and plan  - Adenomyosis: (see Assessment and plan)  - Bladder pain/ Suspected IC: (see bladder pain Assessment and plan)      Adenomyosis  Discussed TVUS with evidence of adenomyosis and can be a cause of pain and bleeding. Discussed recommend hormonal suppression. Discussed options of switching from depo provera to IUD, vs OCPs, patient unsure at this time  and would like to think about. Consents signed for IUD insertion however patient unsure of desire for IUD insertion at this time, will confirm on day of surgery     Myalgia of pelvic floor  tenderness to palpation involving levator ani, and pyriformis syndrome bilaterally. PFPT referral sent for further management and evaluation.     Fertility testing  Discussed with evidence of endometriosis and increased risk of scar tissue and distorted abnormality, patient desires evaluation of tubal patency.  Discussed chromopertubation at time of surgery to assess tubal patency    Bladder pain  bladder tenderness on exam, with patient reports pain with full bladder and voiding every 1-2 hrs. Discussed IC is a possibility of bladder tenderness that can also add to pelvic pain. UA ordered to rule out infectious process. Discussed IC diet, and have pt pay attention to diet and bladder pain pattern and avoid bladder irritants.  Discussed can perform cystoscopy at time of surgery to assess for IC    Counseling: Alternatives to this planned procedure were explained to the patient including expectant, medical and other types of surgical management. She was counseled that this procedure is may improve her pain, however he pain is multifactorial and needs to be address in multiple ways. Discussed continuous suppression with oral hormonal regimen, IUD, Depo provera, or implant. Discussed this can help address pain and bleeding, and does not take opportunity of future fertility.  We have reviewed the typical perioperative course with hospital stay, and post-operative precautions and restrictions have been reviewed.    Patient counseled on the fact that cystotomy complicates approximately 0.3 to 11/1000 benign gynecologic surgeries, especially urogynecologic procedures and hysterectomy.  Patient is aware that, in the event of cystotomy, continuous bladder drainage will be continued for 7-10 days with Parish catheter in place.   Counseled  on ureteral injury rates of 0.2-7.3%, bowel injury rates of <1%.   Transfusion of blood and blood products discussed. Patient was consented for blood transfusion in the case of an emergency.  She understands the possibility of blood transfusion reaction and the attendant risk of transmission of HIV & Hepatitis C to be 1 in 2 million and the risk of Hepatitis B to be 1 in 200,000.  She is aware of the possibility of transfusion reaction. All questions were answered.   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  She has consented to an exam under anesthesia and understands that medical students participate in this portion of the procedure.  All questions were answered.    Preop testing ordered.  Surgery case requested. Surgical consents signed.  Instructions reviewed, including NPO after midnight.   Counseled to hold the following medications on the day of surgery: none   PAT appointment requested   Current contraception: Depo provera    To OR for Diagnostic lap with excision/fulguration of endometriosis, chromopertubation, and cystoscopy with Dr. Murrell     Scheduled on 4/11/2024    Discussed with Dr. Murrell.    Juwan Guerrero MD PGY3  Obstetrics & Gynecology   03/27/2024

## 2024-03-27 ENCOUNTER — OFFICE VISIT (OUTPATIENT)
Dept: GYNECOLOGY | Facility: CLINIC | Age: 29
End: 2024-03-27
Payer: COMMERCIAL

## 2024-03-27 DIAGNOSIS — N80.03 ADENOMYOSIS: ICD-10-CM

## 2024-03-27 DIAGNOSIS — R10.2 PELVIC PAIN: Primary | ICD-10-CM

## 2024-03-27 DIAGNOSIS — Z12.4 CERVICAL CANCER SCREENING: ICD-10-CM

## 2024-03-27 DIAGNOSIS — Z01.818 PREOP TESTING: ICD-10-CM

## 2024-03-27 DIAGNOSIS — R39.89 BLADDER PAIN: ICD-10-CM

## 2024-03-27 DIAGNOSIS — Z31.41 FERTILITY TESTING: ICD-10-CM

## 2024-03-27 DIAGNOSIS — M79.18 MYALGIA OF PELVIC FLOOR: ICD-10-CM

## 2024-03-27 LAB
CLUE CELLS VAG QL WET PREP: ABNORMAL
T VAGINALIS VAG QL WET PREP: ABNORMAL
WBC #/AREA VAG WET PREP: ABNORMAL
YEAST SPEC QL WET PREP: ABNORMAL

## 2024-03-27 PROCEDURE — 87625 HPV TYPES 16 & 18 ONLY: CPT

## 2024-03-27 PROCEDURE — 87625 HPV TYPES 16 & 18 ONLY: CPT | Mod: 59

## 2024-03-27 PROCEDURE — 87210 SMEAR WET MOUNT SALINE/INK: CPT

## 2024-03-27 PROCEDURE — 99212 OFFICE O/P EST SF 10 MIN: CPT | Mod: PBBFAC

## 2024-03-27 PROCEDURE — 87624 HPV HI-RISK TYP POOLED RSLT: CPT

## 2024-03-27 PROCEDURE — 88174 CYTOPATH C/V AUTO IN FLUID: CPT

## 2024-03-27 NOTE — LETTER
March 27, 2024      [facility]  [address] [address]  Phone: [phone]       Patient: Ellis Ramos   YOB: 1995  Date of Visit: 03/27/2024    To Whom It May Concern:    Rafaela Ramos  was at [facility] on 03/27/2024.   She may return to school on 03/28/2024, with no restrictions. If you have any questions or concerns, or if I can be of further assistance, please do not hesitate to contact me.    Sincerely,    Nory Castro, Surgery Nurse Navigator  Women's Health  # [phone]

## 2024-03-28 PROBLEM — Z31.41 FERTILITY TESTING: Status: ACTIVE | Noted: 2024-03-28

## 2024-03-28 PROBLEM — N80.03 ADENOMYOSIS: Status: ACTIVE | Noted: 2024-03-28

## 2024-03-28 PROBLEM — N94.3 PREMENSTRUAL SYNDROME: Status: RESOLVED | Noted: 2023-03-08 | Resolved: 2024-03-28

## 2024-03-28 PROBLEM — M79.18 MYALGIA OF PELVIC FLOOR: Status: ACTIVE | Noted: 2024-03-27

## 2024-03-28 PROBLEM — Z12.4 CERVICAL CANCER SCREENING: Status: ACTIVE | Noted: 2024-03-28

## 2024-03-28 PROBLEM — Z01.818 PREOP TESTING: Status: ACTIVE | Noted: 2024-03-28

## 2024-03-28 PROBLEM — R39.89 BLADDER PAIN: Status: ACTIVE | Noted: 2024-03-27

## 2024-03-28 PROBLEM — M79.18 MYALGIA OF PELVIC FLOOR: Status: ACTIVE | Noted: 2024-03-28

## 2024-03-28 NOTE — ASSESSMENT & PLAN NOTE
tenderness to palpation involving levator ani, and pyriformis syndrome bilaterally. PFPT referral sent for further management and evaluation.

## 2024-03-28 NOTE — ASSESSMENT & PLAN NOTE
Discussed with patient pain is multifactorial:  - Suspected endometriosis: Discussed MRI read with evidence suspicious for presence of endometriosis at the posterior aspect of the uterus, and uterine deviation to patient's right. Discussed recommend diagnostic lap to further evaluate presence of endometriosis, with opportunity to remove any visible suspected endometriotic lesions, however discussed in those case 30% of pts with endometriosis s/p diagnostic lap about 30% will improve, 30% will have no difference to pain, and 30 % will worsen. Discussed possibility of scars that can be taken down at time of surgery although minimal evidence of scar tissue noted on MRI.  Patient with decreased sensation on left side at the level of the mons pubis and inner thigh. Discussed may be endometriotic lesion affecting nerve, plan to pay close attention to anterior abdominal wall.  - Pelvic floor myalgia: see assessment and plan  - Adenomyosis: (see Assessment and plan)  - Bladder pain/ Suspected IC: (see bladder pain Assessment and plan)

## 2024-03-28 NOTE — ASSESSMENT & PLAN NOTE
bladder tenderness on exam, with patient reports pain with full bladder and voiding every 1-2 hrs. Discussed IC is a possibility of bladder tenderness that can also add to pelvic pain. UA ordered to rule out infectious process. Discussed IC diet, and have pt pay attention to diet and bladder pain pattern and avoid bladder irritants.  Discussed can perform cystoscopy at time of surgery to assess for IC

## 2024-03-28 NOTE — ASSESSMENT & PLAN NOTE
Discussed with evidence of endometriosis and increased risk of scar tissue and distorted abnormality, patient desires evaluation of tubal patency.  Discussed chromopertubation at time of surgery to assess tubal patency

## 2024-03-28 NOTE — ASSESSMENT & PLAN NOTE
Discussed TVUS with evidence of adenomyosis and can be a cause of pain and bleeding. Discussed recommend hormonal suppression. Discussed options of switching from depo provera to IUD, vs OCPs, patient unsure at this time and would like to think about. Consents signed for IUD insertion however patient unsure of desire for IUD insertion at this time, will confirm on day of surgery

## 2024-03-31 VITALS
HEIGHT: 66 IN | BODY MASS INDEX: 24.27 KG/M2 | DIASTOLIC BLOOD PRESSURE: 70 MMHG | WEIGHT: 151 LBS | RESPIRATION RATE: 12 BRPM | SYSTOLIC BLOOD PRESSURE: 110 MMHG | TEMPERATURE: 98 F | OXYGEN SATURATION: 100 % | HEART RATE: 88 BPM

## 2024-04-01 ENCOUNTER — LAB VISIT (OUTPATIENT)
Dept: LAB | Facility: HOSPITAL | Age: 29
End: 2024-04-01
Payer: COMMERCIAL

## 2024-04-01 DIAGNOSIS — Z01.818 PREOP TESTING: ICD-10-CM

## 2024-04-01 LAB
GROUP & RH: NORMAL
INDIRECT COOMBS: NORMAL
SPECIMEN OUTDATE: NORMAL

## 2024-04-01 PROCEDURE — 36415 COLL VENOUS BLD VENIPUNCTURE: CPT

## 2024-04-01 PROCEDURE — 86901 BLOOD TYPING SEROLOGIC RH(D): CPT

## 2024-04-03 ENCOUNTER — TELEPHONE (OUTPATIENT)
Dept: GYNECOLOGY | Facility: CLINIC | Age: 29
End: 2024-04-03
Payer: COMMERCIAL

## 2024-04-03 ENCOUNTER — ANESTHESIA EVENT (OUTPATIENT)
Dept: SURGERY | Facility: HOSPITAL | Age: 29
End: 2024-04-03
Payer: COMMERCIAL

## 2024-04-03 PROBLEM — Z01.818 PREOP TESTING: Status: RESOLVED | Noted: 2024-03-28 | Resolved: 2024-04-03

## 2024-04-03 PROBLEM — Z30.09 ENCOUNTER FOR COUNSELING REGARDING CONTRACEPTION: Status: RESOLVED | Noted: 2023-03-08 | Resolved: 2024-04-03

## 2024-04-03 PROBLEM — Z12.4 CERVICAL CANCER SCREENING: Status: RESOLVED | Noted: 2024-03-28 | Resolved: 2024-04-03

## 2024-04-03 PROBLEM — R10.2 PELVIC PAIN: Status: RESOLVED | Noted: 2024-03-13 | Resolved: 2024-04-03

## 2024-04-03 LAB
HIGH RISK HPV 16: NEGATIVE
HIGH RISK HPV 18/45: POSITIVE
HIGH RISK HPV: POSITIVE
PSYCHE PATHOLOGY RESULT: NORMAL

## 2024-04-03 RX ORDER — NAPROXEN 500 MG/1
500 TABLET ORAL 2 TIMES DAILY
Qty: 30 TABLET | Refills: 1 | Status: ON HOLD | OUTPATIENT
Start: 2024-04-03 | End: 2024-04-11 | Stop reason: HOSPADM

## 2024-04-03 NOTE — ANESTHESIA PREPROCEDURE EVALUATION
Ellis Ramos is a 28 y.o. female PRESENTING FOR  LAPAROSCOPY, DIAGNOSTIC       CYSTOSCOPY       CHROMOTUBATION, OVIDUCT (Bilateral: Abdomen)       DESTRUCTION, ENDOMETRIOSIS (Abdomen) with a history of   -ADENOMYOSIS/CYSTS  -AUB  -ABNL PAP  -PELVIC PAIN  -VAPES     BETA-BLOCKER: NONE    New Orders for Anesthesia: UPT    Patient Active Problem List   Diagnosis    Dysmenorrhea    Initiation of Depo Provera    Abnormal uterine bleeding    Menorrhagia with regular cycle    Adenomyosis    Myalgia of pelvic floor    Fertility testing    Bladder pain       Pre-op Assessment    I have reviewed the NPO Status.      Review of Systems  Anesthesia Hx:  No problems with previous Anesthesia                Social:  Vaping       Cardiovascular:  Cardiovascular Normal                                            Pulmonary:  Pulmonary Normal                       Renal/:  Renal/ Normal                 Hepatic/GI:  Hepatic/GI Normal                 Neurological:  Neurology Normal                                      Endocrine:  Endocrine Normal              Vitals:    04/11/24 0533 04/11/24 0619 04/11/24 0634 04/11/24 0636   BP:  122/72     Pulse:  78     Resp: 16 20  20   Temp:  36.3 °C (97.3 °F) 36.3 °C (97.3 °F)    TempSrc:  Temporal     SpO2:  100%     Weight:             Physical Exam  General: Alert, Cooperative and Well nourished    Airway:  Mallampati: II   Mouth Opening: Normal  TM Distance: Normal  Tongue: Normal  Neck ROM: Normal ROM    Dental:  Intact    Chest/Lungs:  Normal Respiratory Rate, Clear to auscultation    Heart:  Rate: Normal  Rhythm: Regular Rhythm  Sounds: Normal      Lab Results   Component Value Date    WBC 5.25 02/25/2024    HGB 15.1 02/25/2024    HCT 45.2 02/25/2024    MCV 95.0 (H) 02/25/2024     02/25/2024       CMP  Sodium Level   Date Value Ref Range Status   02/25/2024 142 136 - 145 mmol/L Final     Potassium Level   Date Value Ref Range Status   02/25/2024 3.6 3.5 - 5.1 mmol/L Final      Carbon Dioxide   Date Value Ref Range Status   02/25/2024 25 22 - 29 mmol/L Final     Blood Urea Nitrogen   Date Value Ref Range Status   02/25/2024 7.0 7.0 - 18.7 mg/dL Final     Creatinine   Date Value Ref Range Status   02/25/2024 0.95 0.55 - 1.02 mg/dL Final     Calcium Level Total   Date Value Ref Range Status   02/25/2024 9.2 8.4 - 10.2 mg/dL Final     Albumin Level   Date Value Ref Range Status   02/25/2024 4.2 3.5 - 5.0 g/dL Final     Bilirubin Total   Date Value Ref Range Status   02/25/2024 0.4 <=1.5 mg/dL Final     Alkaline Phosphatase   Date Value Ref Range Status   02/25/2024 52 40 - 150 unit/L Final     Aspartate Aminotransferase   Date Value Ref Range Status   02/25/2024 13 5 - 34 unit/L Final     Alanine Aminotransferase   Date Value Ref Range Status   02/25/2024 9 0 - 55 unit/L Final     eGFR   Date Value Ref Range Status   02/25/2024 >60 mls/min/1.73/m2 Final      Latest Reference Range & Units 04/11/24 05:54   hCG Qualitative, Urine Negative  Negative         Anesthesia Plan  Type of Anesthesia, risks & benefits discussed:    Anesthesia Type: Gen ETT  Intra-op Monitoring Plan: Standard ASA Monitors  Post Op Pain Control Plan: multimodal analgesia  Induction:  IV  Airway Plan: Direct  Informed Consent: Informed consent signed with the Patient and all parties understand the risks and agree with anesthesia plan.  All questions answered.   ASA Score: 2  Day of Surgery Review of History & Physical: H&P Update referred to the surgeon/provider.    Ready For Surgery From Anesthesia Perspective.     .

## 2024-04-03 NOTE — TELEPHONE ENCOUNTER
Called Ms. Ramos of pap smear results of NILM/HRHPV (+) . Discussed next step of colposcopy prior to scheduled procedure. Plan for fast path prior to 4/11 in anticipation for possible excisional procedure.    Juwan Guerrero MD PGY3  Obstetrics & Gynecology

## 2024-04-04 ENCOUNTER — TELEPHONE (OUTPATIENT)
Dept: GYNECOLOGY | Facility: CLINIC | Age: 29
End: 2024-04-04
Payer: COMMERCIAL

## 2024-04-04 NOTE — TELEPHONE ENCOUNTER
Called and confirmed colpo appt for tomorrow, arrival at 10am and reviewed late policy of 15 minutes past appt time. verbalizes understanding

## 2024-04-05 ENCOUNTER — PROCEDURE VISIT (OUTPATIENT)
Dept: GYNECOLOGY | Facility: CLINIC | Age: 29
End: 2024-04-05
Payer: COMMERCIAL

## 2024-04-05 VITALS
TEMPERATURE: 98 F | OXYGEN SATURATION: 100 % | HEART RATE: 73 BPM | SYSTOLIC BLOOD PRESSURE: 112 MMHG | RESPIRATION RATE: 18 BRPM | HEIGHT: 66 IN | WEIGHT: 151.88 LBS | BODY MASS INDEX: 24.41 KG/M2 | DIASTOLIC BLOOD PRESSURE: 72 MMHG

## 2024-04-05 DIAGNOSIS — R87.810 HUMAN PAPILLOMAVIRUS (HPV) TYPE 18 DNA DETECTED IN CERVICAL SPECIMEN: Primary | ICD-10-CM

## 2024-04-05 DIAGNOSIS — Z23 NEED FOR HPV VACCINATION: ICD-10-CM

## 2024-04-05 DIAGNOSIS — Z11.3 SCREEN FOR STD (SEXUALLY TRANSMITTED DISEASE): ICD-10-CM

## 2024-04-05 LAB
C TRACH DNA SPEC QL NAA+PROBE: NOT DETECTED
CLUE CELLS VAG QL WET PREP: NORMAL
N GONORRHOEA DNA SPEC QL NAA+PROBE: NOT DETECTED
SOURCE (OHS): NORMAL
T VAGINALIS VAG QL WET PREP: NORMAL
WBC #/AREA VAG WET PREP: NORMAL
YEAST SPEC QL WET PREP: NORMAL

## 2024-04-05 PROCEDURE — 87210 SMEAR WET MOUNT SALINE/INK: CPT

## 2024-04-05 PROCEDURE — 57454 BX/CURETT OF CERVIX W/SCOPE: CPT | Mod: PBBFAC | Performed by: OBSTETRICS & GYNECOLOGY

## 2024-04-05 PROCEDURE — 88305 TISSUE EXAM BY PATHOLOGIST: CPT | Mod: TC,91

## 2024-04-05 PROCEDURE — 87491 CHLMYD TRACH DNA AMP PROBE: CPT

## 2024-04-05 NOTE — PROCEDURES
Colposcopy    Date/Time: 4/5/2024 10:15 AM    Performed by: Eber Mccullough MD  Authorized by: Eber Mccullough MD    Consent obatined:  Prior to procedure the appropriate consent was completed and verified  Timeout:Immediately prior to procedure a time out was called to verify the correct patient, procedure, equipment, support staff and site/side marked as required  Assistants?: No      Colposcopy Site:  Cervix  Position:  Supine  Acrowhite Lesion? Yes (10- 1 clock AWFL extending into os; 3-6 oclock AWFL extending into os. 6 oclock lesion dense; Lesions could not be completely visualized)    Atypical Vessels: No    Transformation Zone Adequate?: No    Biopsy?: Yes         Location:  Cervix ((3 00, 6 00, 10 00 and 1 00))  ECC Performed?: Yes    LEEP Performed?: No    Estimated blood loss (cc):  1   Patient tolerated the procedure well with no immediate complications.   Post-operative instructions were provided for the patient.   Patient was discharged and will follow up if any complications occur

## 2024-04-05 NOTE — PROGRESS NOTES
[facility] Colposcopy Clinic Note    Subjective:         HPI:   Ellis Ramos is a 28 y.o.  presents for colposcopy. Results of abnormal pap smear were discussed with patient. NILM HR HPV 18+ Indications/risks/benefits of colposcopy were discussed and consents were signed and witnessed prior to the procedure, all questions answered.      Patient with a history of dysmenorrhea as well as chronic pelvic pain scheduled for diagnostic laparoscopy with chromopertubation on .    Today the patient has no complaints.    Pap History:   2019 NILM no HPV testing  2022 NILM no HPV testing  3/2024 NILM, HPV 18/45+    Gardasil Vaccine Status: None    Previous excisional procedure: None    Tobacco use: None    Contraception: Depo shot since 2023    Sexual history: Not currently sexually active, multiple lifetime partners    STD History: No history, routine testing today    Menstrual history: LMP 2024, irregular 2/2 depo    OB hx: none    Desires future fertility? yes    Review of Systems    + Pelvic pain since December    Patient denies fever, dizziness, headache, SOB, chest pain, abdominal pain, nausea, vomiting, constipation, diarrhea, dysuria, extremity pain, or weakness.     Assessment/Plan:       28 y.o.  now s/p colposcopy.    Clinical impression:  Moderate to severe cervical dysplasia- counseled patient based on clinical exam today we will likely recommend excisional procedure concurrently with diagnostic laparoscopy.  We will follow-up on pathology results from colposcopy biopsies to confirm plan of care.    Colposcopy performed today with dense areas of aceto-white focal lesion noted at 10 to 1 o'clock position in 3 to 6 o'clock position.  Cervical biopsies at 3, 6, 10, 1:00 a.m. as well as ECC was performed today.  See procedure note for details on procedure.  Problem List Items Addressed This Visit    None      Precautions given to the patient to return to ED if vaginal bleeding,  fevers, pain, or any other concerns.  Patient expressed understanding and all questions were answered.    Gardisil vaccine series started today.   Routine STI testing today.   Follow up prn pending results of colposcopy.       Arpita Dewitt, MS3    Eber Mccullough MD  GYN staff

## 2024-04-09 LAB
ESTROGEN SERPL-MCNC: NORMAL PG/ML
INSULIN SERPL-ACNC: NORMAL U[IU]/ML
LAB AP CLINICAL INFORMATION: NORMAL
LAB AP GROSS DESCRIPTION: NORMAL
LAB AP REPORT FOOTNOTES: NORMAL
T3RU NFR SERPL: NORMAL %

## 2024-04-10 ENCOUNTER — PATIENT MESSAGE (OUTPATIENT)
Dept: ADMINISTRATIVE | Facility: OTHER | Age: 29
End: 2024-04-10
Payer: COMMERCIAL

## 2024-04-10 ENCOUNTER — TELEPHONE (OUTPATIENT)
Dept: GYNECOLOGY | Facility: CLINIC | Age: 29
End: 2024-04-10
Payer: COMMERCIAL

## 2024-04-10 NOTE — TELEPHONE ENCOUNTER
Called patient to review results of the colposcopy completed on 04/05/24. She strongly desires future fertility; as such, will plan for LEEP with possible top hat given inability to visualize transformation zone and indeterminate ECC specimen. Patient also endorses continued AUB with desire to undergo hysteroscopy D&C for further evaluation. Discussed addition of LEEP and hysteroscopy D&C with OR team.    Patient and plan discussed with Dr. Hair.    Kristi Godfrey MD  [employer], PGY-3

## 2024-04-11 ENCOUNTER — ANESTHESIA (OUTPATIENT)
Dept: SURGERY | Facility: HOSPITAL | Age: 29
End: 2024-04-11
Payer: COMMERCIAL

## 2024-04-11 ENCOUNTER — HOSPITAL ENCOUNTER (OUTPATIENT)
Facility: HOSPITAL | Age: 29
Discharge: HOME | End: 2024-04-11
Attending: OBSTETRICS & GYNECOLOGY | Admitting: STUDENT IN AN ORGANIZED HEALTH CARE EDUCATION/TRAINING PROGRAM
Payer: COMMERCIAL

## 2024-04-11 DIAGNOSIS — N80.03 ADENOMYOSIS: ICD-10-CM

## 2024-04-11 DIAGNOSIS — Z98.890 POSTOPERATIVE STATE: ICD-10-CM

## 2024-04-11 DIAGNOSIS — N93.9 ABNORMAL UTERINE BLEEDING: ICD-10-CM

## 2024-04-11 DIAGNOSIS — N94.6 DYSMENORRHEA: ICD-10-CM

## 2024-04-11 DIAGNOSIS — N92.0 MENORRHAGIA WITH REGULAR CYCLE: ICD-10-CM

## 2024-04-11 DIAGNOSIS — R39.89 BLADDER PAIN: Primary | ICD-10-CM

## 2024-04-11 DIAGNOSIS — R10.2 PELVIC PAIN: ICD-10-CM

## 2024-04-11 LAB
B-HCG UR QL: NEGATIVE
CTP QC/QA: YES
GROUP & RH: NORMAL
INDIRECT COOMBS: NORMAL
SPECIMEN OUTDATE: NORMAL

## 2024-04-11 PROCEDURE — 71000015 HC POSTOP RECOV 1ST HR: Performed by: STUDENT IN AN ORGANIZED HEALTH CARE EDUCATION/TRAINING PROGRAM

## 2024-04-11 PROCEDURE — 25000003 PHARM REV CODE 250: Performed by: STUDENT IN AN ORGANIZED HEALTH CARE EDUCATION/TRAINING PROGRAM

## 2024-04-11 PROCEDURE — D9220A PRA ANESTHESIA: Mod: ANES,,, | Performed by: ANESTHESIOLOGY

## 2024-04-11 PROCEDURE — 63600175 PHARM REV CODE 636 W HCPCS

## 2024-04-11 PROCEDURE — 88305 TISSUE EXAM BY PATHOLOGIST: CPT | Mod: TC,91 | Performed by: STUDENT IN AN ORGANIZED HEALTH CARE EDUCATION/TRAINING PROGRAM

## 2024-04-11 PROCEDURE — 88307 TISSUE EXAM BY PATHOLOGIST: CPT | Mod: TC | Performed by: STUDENT IN AN ORGANIZED HEALTH CARE EDUCATION/TRAINING PROGRAM

## 2024-04-11 PROCEDURE — 25000003 PHARM REV CODE 250

## 2024-04-11 PROCEDURE — 71000016 HC POSTOP RECOV ADDL HR: Performed by: STUDENT IN AN ORGANIZED HEALTH CARE EDUCATION/TRAINING PROGRAM

## 2024-04-11 PROCEDURE — 81025 URINE PREGNANCY TEST: CPT | Performed by: NURSE PRACTITIONER

## 2024-04-11 PROCEDURE — 63600175 PHARM REV CODE 636 W HCPCS: Performed by: ANESTHESIOLOGY

## 2024-04-11 PROCEDURE — 37000008 HC ANESTHESIA 1ST 15 MINUTES: Performed by: STUDENT IN AN ORGANIZED HEALTH CARE EDUCATION/TRAINING PROGRAM

## 2024-04-11 PROCEDURE — 86901 BLOOD TYPING SEROLOGIC RH(D): CPT

## 2024-04-11 PROCEDURE — 37000009 HC ANESTHESIA EA ADD 15 MINS: Performed by: STUDENT IN AN ORGANIZED HEALTH CARE EDUCATION/TRAINING PROGRAM

## 2024-04-11 PROCEDURE — 36000708 HC OR TIME LEV III 1ST 15 MIN: Performed by: STUDENT IN AN ORGANIZED HEALTH CARE EDUCATION/TRAINING PROGRAM

## 2024-04-11 PROCEDURE — C1782 MORCELLATOR: HCPCS | Performed by: STUDENT IN AN ORGANIZED HEALTH CARE EDUCATION/TRAINING PROGRAM

## 2024-04-11 PROCEDURE — 25000003 PHARM REV CODE 250: Performed by: ANESTHESIOLOGY

## 2024-04-11 PROCEDURE — 63600175 PHARM REV CODE 636 W HCPCS: Mod: JZ,JG | Performed by: STUDENT IN AN ORGANIZED HEALTH CARE EDUCATION/TRAINING PROGRAM

## 2024-04-11 PROCEDURE — 25000003 PHARM REV CODE 250: Performed by: NURSE ANESTHETIST, CERTIFIED REGISTERED

## 2024-04-11 PROCEDURE — 63600175 PHARM REV CODE 636 W HCPCS: Performed by: NURSE ANESTHETIST, CERTIFIED REGISTERED

## 2024-04-11 PROCEDURE — 27201423 OPTIME MED/SURG SUP & DEVICES STERILE SUPPLY: Performed by: STUDENT IN AN ORGANIZED HEALTH CARE EDUCATION/TRAINING PROGRAM

## 2024-04-11 PROCEDURE — 36000709 HC OR TIME LEV III EA ADD 15 MIN: Performed by: STUDENT IN AN ORGANIZED HEALTH CARE EDUCATION/TRAINING PROGRAM

## 2024-04-11 PROCEDURE — 71000033 HC RECOVERY, INTIAL HOUR: Performed by: STUDENT IN AN ORGANIZED HEALTH CARE EDUCATION/TRAINING PROGRAM

## 2024-04-11 PROCEDURE — D9220A PRA ANESTHESIA: Mod: CRNA,,, | Performed by: NURSE ANESTHETIST, CERTIFIED REGISTERED

## 2024-04-11 RX ORDER — LIDOCAINE HYDROCHLORIDE 20 MG/ML
INJECTION INTRAVENOUS
Status: DISCONTINUED | OUTPATIENT
Start: 2024-04-11 | End: 2024-04-11

## 2024-04-11 RX ORDER — ONDANSETRON HYDROCHLORIDE 2 MG/ML
4 INJECTION, SOLUTION INTRAVENOUS DAILY PRN
Status: DISCONTINUED | OUTPATIENT
Start: 2024-04-11 | End: 2024-04-11 | Stop reason: HOSPADM

## 2024-04-11 RX ORDER — HYOSCYAMINE SULFATE 0.12 MG/1
0.12 TABLET SUBLINGUAL EVERY 4 HOURS PRN
Qty: 15 TABLET | Refills: 0 | Status: SHIPPED | OUTPATIENT
Start: 2024-04-11

## 2024-04-11 RX ORDER — IBUPROFEN 600 MG/1
600 TABLET ORAL 3 TIMES DAILY
Qty: 30 TABLET | Refills: 0 | Status: SHIPPED | OUTPATIENT
Start: 2024-04-11

## 2024-04-11 RX ORDER — ACETAMINOPHEN 500 MG
1000 TABLET ORAL EVERY 6 HOURS PRN
Qty: 60 TABLET | Refills: 0 | Status: SHIPPED | OUTPATIENT
Start: 2024-04-11

## 2024-04-11 RX ORDER — PHENYLEPHRINE HYDROCHLORIDE 10 MG/ML
INJECTION INTRAVENOUS
Status: DISCONTINUED | OUTPATIENT
Start: 2024-04-11 | End: 2024-04-11

## 2024-04-11 RX ORDER — OXYCODONE HYDROCHLORIDE 5 MG/1
5 TABLET ORAL
Status: DISCONTINUED | OUTPATIENT
Start: 2024-04-11 | End: 2024-04-11 | Stop reason: HOSPADM

## 2024-04-11 RX ORDER — MISOPROSTOL 100 UG/1
200 TABLET ORAL
Status: COMPLETED | OUTPATIENT
Start: 2024-04-11 | End: 2024-04-11

## 2024-04-11 RX ORDER — GABAPENTIN 300 MG/1
600 CAPSULE ORAL
Status: COMPLETED | OUTPATIENT
Start: 2024-04-11 | End: 2024-04-11

## 2024-04-11 RX ORDER — OXYCODONE HYDROCHLORIDE 5 MG/1
5 TABLET ORAL EVERY 4 HOURS PRN
Qty: 6 TABLET | Refills: 0 | Status: SHIPPED | OUTPATIENT
Start: 2024-04-11

## 2024-04-11 RX ORDER — ONDANSETRON 4 MG/1
4 TABLET, ORALLY DISINTEGRATING ORAL ONCE
Status: DISCONTINUED | OUTPATIENT
Start: 2024-04-11 | End: 2024-04-11

## 2024-04-11 RX ORDER — MORPHINE SULFATE 2 MG/ML
2 INJECTION, SOLUTION INTRAMUSCULAR; INTRAVENOUS EVERY 5 MIN PRN
Status: DISCONTINUED | OUTPATIENT
Start: 2024-04-11 | End: 2024-04-11 | Stop reason: HOSPADM

## 2024-04-11 RX ORDER — MEPERIDINE HYDROCHLORIDE 25 MG/ML
12.5 INJECTION INTRAMUSCULAR; INTRAVENOUS; SUBCUTANEOUS EVERY 10 MIN PRN
Status: DISCONTINUED | OUTPATIENT
Start: 2024-04-11 | End: 2024-04-11 | Stop reason: HOSPADM

## 2024-04-11 RX ORDER — LIDOCAINE HYDROCHLORIDE 10 MG/ML
INJECTION INFILTRATION; PERINEURAL
Status: DISCONTINUED | OUTPATIENT
Start: 2024-04-11 | End: 2024-04-11 | Stop reason: HOSPADM

## 2024-04-11 RX ORDER — METHYLENE BLUE 5 MG/ML
INJECTION INTRAVENOUS
Status: DISCONTINUED | OUTPATIENT
Start: 2024-04-11 | End: 2024-04-11 | Stop reason: HOSPADM

## 2024-04-11 RX ORDER — FAMOTIDINE 20 MG/1
20 TABLET, FILM COATED ORAL
Status: COMPLETED | OUTPATIENT
Start: 2024-04-11 | End: 2024-04-11

## 2024-04-11 RX ORDER — SODIUM CHLORIDE, SODIUM LACTATE, POTASSIUM CHLORIDE, CALCIUM CHLORIDE 600; 310; 30; 20 MG/100ML; MG/100ML; MG/100ML; MG/100ML
INJECTION, SOLUTION INTRAVENOUS CONTINUOUS
Status: ACTIVE | OUTPATIENT
Start: 2024-04-11

## 2024-04-11 RX ORDER — PROPOFOL 10 MG/ML
VIAL (ML) INTRAVENOUS
Status: DISCONTINUED | OUTPATIENT
Start: 2024-04-11 | End: 2024-04-11

## 2024-04-11 RX ORDER — TRAMADOL HYDROCHLORIDE 50 MG/1
50 TABLET ORAL
Status: COMPLETED | OUTPATIENT
Start: 2024-04-11 | End: 2024-04-11

## 2024-04-11 RX ORDER — ONDANSETRON HYDROCHLORIDE 2 MG/ML
INJECTION, SOLUTION INTRAVENOUS
Status: DISCONTINUED | OUTPATIENT
Start: 2024-04-11 | End: 2024-04-11

## 2024-04-11 RX ORDER — KETOROLAC TROMETHAMINE 30 MG/ML
INJECTION, SOLUTION INTRAMUSCULAR; INTRAVENOUS
Status: DISCONTINUED | OUTPATIENT
Start: 2024-04-11 | End: 2024-04-11

## 2024-04-11 RX ORDER — GLYCOPYRROLATE 0.2 MG/ML
INJECTION INTRAMUSCULAR; INTRAVENOUS
Status: DISCONTINUED | OUTPATIENT
Start: 2024-04-11 | End: 2024-04-11

## 2024-04-11 RX ORDER — SENNOSIDES 8.6 MG/1
1 TABLET ORAL DAILY PRN
Qty: 14 TABLET | Refills: 0 | Status: SHIPPED | OUTPATIENT
Start: 2024-04-11

## 2024-04-11 RX ORDER — FENTANYL CITRATE 50 UG/ML
INJECTION, SOLUTION INTRAMUSCULAR; INTRAVENOUS
Status: DISCONTINUED | OUTPATIENT
Start: 2024-04-11 | End: 2024-04-11

## 2024-04-11 RX ORDER — SODIUM CHLORIDE 0.9 % (FLUSH) 0.9 %
10 SYRINGE (ML) INJECTION
Status: DISCONTINUED | OUTPATIENT
Start: 2024-04-11 | End: 2024-04-11 | Stop reason: HOSPADM

## 2024-04-11 RX ORDER — DEXAMETHASONE SODIUM PHOSPHATE 4 MG/ML
INJECTION, SOLUTION INTRA-ARTICULAR; INTRALESIONAL; INTRAMUSCULAR; INTRAVENOUS; SOFT TISSUE
Status: DISCONTINUED | OUTPATIENT
Start: 2024-04-11 | End: 2024-04-11

## 2024-04-11 RX ORDER — DOXYCYCLINE HYCLATE 100 MG
200 TABLET ORAL
Status: DISCONTINUED | OUTPATIENT
Start: 2024-04-11 | End: 2024-04-11

## 2024-04-11 RX ORDER — KETAMINE HCL IN 0.9 % NACL 50 MG/5 ML
SYRINGE (ML) INTRAVENOUS
Status: DISCONTINUED | OUTPATIENT
Start: 2024-04-11 | End: 2024-04-11

## 2024-04-11 RX ORDER — ROCURONIUM BROMIDE 10 MG/ML
INJECTION, SOLUTION INTRAVENOUS
Status: DISCONTINUED | OUTPATIENT
Start: 2024-04-11 | End: 2024-04-11

## 2024-04-11 RX ORDER — ONDANSETRON 4 MG/1
4 TABLET, ORALLY DISINTEGRATING ORAL ONCE
Status: COMPLETED | OUTPATIENT
Start: 2024-04-11 | End: 2024-04-11

## 2024-04-11 RX ORDER — MIDAZOLAM HYDROCHLORIDE 2 MG/2ML
2 INJECTION, SOLUTION INTRAMUSCULAR; INTRAVENOUS ONCE AS NEEDED
Status: COMPLETED | OUTPATIENT
Start: 2024-04-11 | End: 2024-04-11

## 2024-04-11 RX ORDER — ACETAMINOPHEN 500 MG
1000 TABLET ORAL
Status: COMPLETED | OUTPATIENT
Start: 2024-04-11 | End: 2024-04-11

## 2024-04-11 RX ORDER — HYOSCYAMINE SULFATE 0.125 MG
125 TABLET ORAL EVERY 4 HOURS PRN
Status: DISCONTINUED | OUTPATIENT
Start: 2024-04-11 | End: 2024-04-11 | Stop reason: HOSPADM

## 2024-04-11 RX ADMIN — FAMOTIDINE 20 MG: 20 TABLET, FILM COATED ORAL at 07:04

## 2024-04-11 RX ADMIN — MISOPROSTOL 200 MCG: 100 TABLET ORAL at 07:04

## 2024-04-11 RX ADMIN — SODIUM CHLORIDE, POTASSIUM CHLORIDE, SODIUM LACTATE AND CALCIUM CHLORIDE: 600; 310; 30; 20 INJECTION, SOLUTION INTRAVENOUS at 09:04

## 2024-04-11 RX ADMIN — Medication 15 MG: at 08:04

## 2024-04-11 RX ADMIN — SODIUM CHLORIDE, POTASSIUM CHLORIDE, SODIUM LACTATE AND CALCIUM CHLORIDE: 600; 310; 30; 20 INJECTION, SOLUTION INTRAVENOUS at 06:04

## 2024-04-11 RX ADMIN — SUGAMMADEX 200 MG: 100 INJECTION, SOLUTION INTRAVENOUS at 09:04

## 2024-04-11 RX ADMIN — ACETAMINOPHEN 1000 MG: 500 TABLET ORAL at 06:04

## 2024-04-11 RX ADMIN — ROCURONIUM BROMIDE 10 MG: 10 INJECTION INTRAVENOUS at 07:04

## 2024-04-11 RX ADMIN — MIDAZOLAM HYDROCHLORIDE 2 MG: 1 INJECTION, SOLUTION INTRAMUSCULAR; INTRAVENOUS at 06:04

## 2024-04-11 RX ADMIN — DOXYCYCLINE 200 MG: 100 INJECTION, POWDER, LYOPHILIZED, FOR SOLUTION INTRAVENOUS at 07:04

## 2024-04-11 RX ADMIN — FENTANYL CITRATE 75 MCG: 50 INJECTION INTRAMUSCULAR; INTRAVENOUS at 07:04

## 2024-04-11 RX ADMIN — PHENYLEPHRINE HYDROCHLORIDE 50 MCG: 10 INJECTION INTRAVENOUS at 07:04

## 2024-04-11 RX ADMIN — GLYCOPYRROLATE 0.1 MG: 0.2 INJECTION INTRAMUSCULAR; INTRAVENOUS at 07:04

## 2024-04-11 RX ADMIN — PHENYLEPHRINE HYDROCHLORIDE 100 MCG: 10 INJECTION INTRAVENOUS at 07:04

## 2024-04-11 RX ADMIN — HYOSCYAMINE SULFATE 125 MCG: 0.12 TABLET ORAL at 01:04

## 2024-04-11 RX ADMIN — PHENYLEPHRINE HYDROCHLORIDE 50 MCG: 10 INJECTION INTRAVENOUS at 09:04

## 2024-04-11 RX ADMIN — TRAMADOL HYDROCHLORIDE 50 MG: 50 TABLET, COATED ORAL at 06:04

## 2024-04-11 RX ADMIN — GABAPENTIN 600 MG: 300 CAPSULE ORAL at 06:04

## 2024-04-11 RX ADMIN — OXYCODONE HYDROCHLORIDE 5 MG: 5 TABLET ORAL at 10:04

## 2024-04-11 RX ADMIN — Medication 25 MG: at 07:04

## 2024-04-11 RX ADMIN — DEXAMETHASONE SODIUM PHOSPHATE 8 MG: 4 INJECTION, SOLUTION INTRA-ARTICULAR; INTRALESIONAL; INTRAMUSCULAR; INTRAVENOUS; SOFT TISSUE at 07:04

## 2024-04-11 RX ADMIN — FENTANYL CITRATE 25 MCG: 50 INJECTION INTRAMUSCULAR; INTRAVENOUS at 08:04

## 2024-04-11 RX ADMIN — LIDOCAINE HYDROCHLORIDE 60 MG: 20 INJECTION INTRAVENOUS at 07:04

## 2024-04-11 RX ADMIN — Medication 10 MG: at 09:04

## 2024-04-11 RX ADMIN — ONDANSETRON 4 MG: 2 INJECTION INTRAMUSCULAR; INTRAVENOUS at 10:04

## 2024-04-11 RX ADMIN — PROPOFOL 150 MG: 10 INJECTION, EMULSION INTRAVENOUS at 07:04

## 2024-04-11 RX ADMIN — ROCURONIUM BROMIDE 30 MG: 10 INJECTION INTRAVENOUS at 08:04

## 2024-04-11 RX ADMIN — ONDANSETRON 4 MG: 2 INJECTION INTRAMUSCULAR; INTRAVENOUS at 09:04

## 2024-04-11 RX ADMIN — ONDANSETRON 4 MG: 4 TABLET, ORALLY DISINTEGRATING ORAL at 12:04

## 2024-04-11 RX ADMIN — KETOROLAC TROMETHAMINE 30 MG: 30 INJECTION, SOLUTION INTRAMUSCULAR at 09:04

## 2024-04-11 RX ADMIN — ROCURONIUM BROMIDE 40 MG: 10 INJECTION INTRAVENOUS at 07:04

## 2024-04-11 RX ADMIN — SODIUM CHLORIDE, POTASSIUM CHLORIDE, SODIUM LACTATE AND CALCIUM CHLORIDE: 600; 310; 30; 20 INJECTION, SOLUTION INTRAVENOUS at 07:04

## 2024-04-11 NOTE — DISCHARGE INSTRUCTIONS
· Keep follow up appointment with [facility]. Resume home medications unless otherwise instructed by your doctor.    · Pelvic rest for 6 weeks (NO baths, NO soaking, tampons, douches or sex).    · No heavy lifting(over 10 pounds)/straining for next few weeks.    · May return to work when feeling better and not taking narcotic pain medication.    · Take pain medication as prescribed. If you are experiencing severe pain even with your pain medications, please call the Womens clinic at [phone] to notify your doctor.    · Take over the counter laxatives such as Miralax  or prescribed Senna for constipation. Do not strain to have a bowel movement.    · Brace belly with pillow when coughing/sneezing/deep breathing.    · No driving or consuming alcohol for the next 24 hours.    · Some bleeding/spotting is to be expected for several days.    - May have some discomfort, light bleeding when first voiding for the first few times, all normal.    · Notify MD of bleeding more than 1 pad per hour, any moderate to severe pain unrelieved by pain medicine or for any signs of infection including fever above 100.4, yellow/green foul-smelling drainage, nausea or vomiting. Clinics number: [phone], or after business hours or emergency call [phone].    · Thanks for choosing [facility]! Have a smooth recovery!

## 2024-04-11 NOTE — OP NOTE
[facility]  General Surgery  Operative Note    SUMMARY     Surgery Date: 4/11/2024     Surgeon(s) and Role:     * Yola Hair MD - Primary     * Kristi Godfrey MD - Resident - Assisting     * Dorina rPatt MD - Resident - Assisting    Pre-op Diagnosis:    Cervical dysplasia  Abnormal uterine bleeding  Pelvic pain  Bladder pain    Post-op Diagnosis:    Cervical dysplasia  Abnormal uterine bleeding  Pelvic pain  Uterine polyps  Interstitial cystitis   Left fallopian tube occlusion    Procedures:   LEEP  Hysteroscopy D&C  Diagnostic laparoscopy  Chromotubation  Cystoscopy with hydrodistension    Anesthesia: General    Operative Findings: Normal external female genitalia without lesion. Bimanual exam revealing 8 cm uterus deviated to right, anteverted, mobile. 3 cm beneath pubic arch, 8 cm between ischial spines, moderate descent noted. No adnexal masses or irregularity to contour of uterus noted. Vaginal mucosa pink and moist, cervix smooth, no lesions, abnormal discharge, or bleeding noted.    During CKC, cervix noted to be deviated to patient right. Application of Lugol's revealing area of decreased uptake circumferentially around transformation zone. Hysteroscopic evaluation revealing endocervical canal without lesion. Uterine cavity with diffuse polypoid tissue throughout.  Bilateral tubal ostia visualized.     On laparoscopy, no evidence of injury to underlying structures on survey. Normal liver edge, gallbladder, and stomach. Ovaries and fallopian tubes normal in appearance. Uterus 8 cm, with 6 5-10 mm submucosal fibroids visible on posterior and fundal surface of uterus. Ureters visualized bilaterally. No evidence of endometriosis on diaphragm, peritoneal surfaces, appendix, or pelvic organs.     Chromotubation revealing patent right tube and occluded left tube. No obvious lesion or mass visualized distorting tube.     Cystoscopy revealing no gross lesions of bladder. Bilateral  ureteral orifices visualized. After hydrodistension, Hunner's lesions and petechiae noted diffusely throughout bladder.     Technique: The patient was taken to the operating room with IVF running. SCDs were placed on bilateral lower extremities for DVT prophylaxis. General anesthesia was obtained without difficulty and found to be adequate. She was placed in the dorsal lithotomy position with legs in Saman type stirrups. Exam under anesthesia revealed the findings as above. She was prepped and draped in the normal sterile fashion. A straight catheter was placed to drain the bladder. A timeout was performed.    A weighted speculum was placed into posterior aspect of the vagina. A right angle retractor was placed into the anterior aspect of the vagina. The anterior lip of the cervix was grasped with a single-toothed tenaculum. Lugol's was applied to cervix with area of decreased uptake noted circumferentially just outside transformation zone. The weighted speculum and right angle retractor were removed and an insulated speculum was placed into the vagina. An insulated sidewall retractor was also positioned in vagina. Two passes with the loop excisor was made from the left to right of the cervix. Next, two passes were made with the top hat into the endocervix. An ECC was obtained using kevorkian currette. Hemostasis was obtained using the roller ball tip on the bovie.     The cervix was then sequentially dilated to 6 mm using Hegar dilators. A Betocchi hysteroscope was introduced into the cervix with hydrodistension. The above findings were then noted. Photographs were taken of the endometrial cavity. The hysteroscope was then removed. The cervix was then dilated to 8 mm using Hegar dilators. The tenaculum was noted to slip from the anterior lip of the cervix and was replaced with an Allis clamp. The Myosure hysteroscope was then advanced into the uterine cavity  and the tissue resecting device was used to remove  polypoid tissue. The hysteroscope was then removed. The Allis clamp was noted to slip from the anterior lip of the cervix, and was replaced with a rings forceps. The uterus was curetted in a clockwise fashion until a gritty feeling was noted in all aspects of the uterus. The endometrial scrapings were sent to pathology. A Humi manipulator was then placed in the uterus in the usual fashion and the rings forceps was removed from the cervix.     Attention was then paid to the abdomen. A 5-mm infraumbilical incision was made with a scalpel. A Veress needle was used to enter the abdomen. Upon confirmation of entry into the abdomen, low-flow CO2 gas was initiated with confirmation of low pressure and thus high-flow was initiated. Next, under direct visualization, a 5-mm trocar introduced into the abdomen. No visceral or vascular injury occurred with entry into abdomen. At that time, Trendelenberg position was obtained to keep the bowel out of the operative field. A survey of the abdomen and pelvis was performed with findings as listed above. Methylene blue mixed with saline was injected through the Humi manipulator, with passage of dye into the pelvic cavity noted from from the right fallopian tube. The left tube was noted to be occluded with no spillage of dye. An additional 5 mm incision was made on the abdomen in the right lower quadrant, and a trocar was placed under direct visualization. A grasper was used to manipulate the right fallopian tube, and no clear source of occlusion was identified. Methylene blue was again injected into the uterus with the same results as before, spillage noted from right fallopian tube only. All instruments were removed from the abdomen. The abdomen was then desufflated and trocars were removed. The trocar incisions were closed and hemostasis was noted.     Cystoscopy was performed with findings as noted above. The bladder was filled to approximately 80 mm Hg pressure for 5 minutes. The  bladder was drained, and the cystoscope was reinserted. Findings confirming interstitial cystitis noted as listed above. The cystoscope was removed.     A weighted speculum and right angle retractor were placed into the vagina, and the cervix was reexamined. A 3 mm laceration was noted on anterior lip of cervix at location of tenaculum placement. A figure of eight suture was placed and good hemostasis was noted. All instruments were removed from the vagina.     The patient tolerated the procedure well. The instrument and sponge counts were correct times two. The patient was awakened from anesthesia and was taken to recovery in stable condition. Dr. Hair was present for the entire procedure.     Complications: None    IV fluids: 900 mL    Fluid deficit: 590 mL normal saline    Drain: 350 mL clear, yellow urine    Estimated Blood Loss: 100 mL    Specimens:   Specimen (24h ago, onward)       Start     Ordered    04/11/24 0803  Specimen to Pathology  RELEASE UPON ORDERING        References:    Click here for ordering Quick Tip   Question:  Release to patient  Answer:  Immediate    04/11/24 0803                            Condition: Good    Disposition: PACU - hemodynamically stable.    Dorina Pratt MD  [facility], PGY-2

## 2024-04-11 NOTE — ANESTHESIA POSTPROCEDURE EVALUATION
Anesthesia Post Evaluation    Patient: Ellis Ramos    Procedure(s) Performed: Procedure(s) (LRB):  LAPAROSCOPY, DIAGNOSTIC (N/A)  CYSTOSCOPY (N/A)  CHROMOTUBATION, OVIDUCT (Bilateral)  HYSTEROSCOPY, WITH DILATION AND CURETTAGE OF UTERUS (N/A)  LEEP CONIZATION, CERVIX (N/A)    Final Anesthesia Type: general      Patient location during evaluation: DOSC  Post-procedure vital signs: reviewed and stable  Airway patency: patent      Anesthetic complications: no      Cardiovascular status: hemodynamically stable  Respiratory status: spontaneous ventilation  Follow-up not needed.              Vitals Value Taken Time   /74 04/11/24 1132   Temp 36.4 °C (97.5 °F) 04/11/24 1019   Pulse 59 04/11/24 1145   Resp 20 04/11/24 1047   SpO2 100 % 04/11/24 1145   Vitals shown include unvalidated device data.      Event Time   Out of Recovery 10:16:00         Pain/Janie Score: Pain Rating Prior to Med Admin: 8 (4/11/2024 10:40 AM)  Janie Score: 10 (4/11/2024 10:19 AM)

## 2024-04-11 NOTE — BRIEF OP NOTE
[facility]  Brief Operative Note and Discharge Summary    Surgery Date: 4/11/2024     Surgeon(s) and Role:     * Yola Hair MD - Primary     * Kristi Godfrey MD - Resident - Assisting     * Dorina Pratt MD - Resident - Assisting    Pre-op Diagnosis:    Cervical dysplasia  Abnormal uterine bleeding  Pelvic pain  Bladder pain    Post-op Diagnosis:    Cervical dysplasia  Abnormal uterine bleeding  Pelvic pain  Uterine polyps  Interstitial cystitis   Left fallopian tube occlusion    Procedures:   LEEP  Hysteroscopy D&C  Diagnostic laparoscopy  Chromotubation  Cystoscopy with hydrodistension    Anesthesia: General    Operative Findings: Normal external female genitalia without lesion. Bimanual exam revealing 8 cm uterus deviated to right, anteverted, mobile. 3 cm beneath pubic arch, 8 cm between ischial spines, moderate descent noted. No adnexal masses or irregularity to contour of uterus noted. Vaginal mucosa pink and moist, cervix smooth, no lesions, abnormal discharge, or bleeding noted.    During CKC, cervix noted to be deviated to patient right. Application of Lugol's revealing area of decreased uptake circumferentially around transformation zone. Hysteroscopic evaluation revealing endocervical canal without lesion. Uterine cavity with diffuse polypoid tissue throughout.  Bilateral tubal ostia visualized.     On laparoscopy, no evidence of injury to underlying structures on survey. Normal liver edge, gallbladder, and stomach. Ovaries and fallopian tubes normal in appearance. Uterus 8 cm, with 6 5-10 mm submucosal fibroids visible on posterior and fundal surface of uterus. Ureters visualized bilaterally. No evidence of endometriosis on diaphragm, peritoneal surfaces, appendix, or pelvic organs.     Chromotubation revealing patent right tube and occluded left tube. No obvious lesion or mass visualized distorting tube.     Cystoscopy revealing no gross lesions of bladder. Bilateral  ureteral orifices visualized. After hydrodistension, Hunner's lesions and petechiae noted diffusely throughout bladder.     Complications: None    IV fluids: 900 mL    Fluid deficit: 590 mL normal saline    Drain: 350 mL clear, yellow urine    Estimated Blood Loss: 100 mL         Specimens:   Specimen (24h ago, onward)       Start     Ordered    04/11/24 0803  Specimen to Pathology  RELEASE UPON ORDERING        References:    Click here for ordering Quick Tip   Question:  Release to patient  Answer:  Immediate    04/11/24 0803                      Discharge Note    OUTCOME: Patient tolerated treatment/procedure well without complication and is now ready for discharge.    DISPOSITION: Home or Self Care    FOLLOWUP: With OBGYN team in 2 weeks    DISCHARGE INSTRUCTIONS:    Discharge Procedure Orders   Diet Adult Regular     Pelvic Rest     Lifting restrictions   Order Comments: 10 pounds     No driving until:   Order Comments: Until no longer requiring narcotics     Activity as tolerated        Clinical Reference Documents Added to Patient Instructions         Document    CYSTOSCOPY DISCHARGE INSTRUCTIONS (ENGLISH)    HYSTEROSCOPY DISCHARGE INSTRUCTIONS (ENGLISH)            Dorina Pratt MD  [facility], PGY-2

## 2024-04-11 NOTE — ANESTHESIA PROCEDURE NOTES
Intubation    Date/Time: 4/11/2024 7:19 AM    Performed by: Jus Pratt CRNA  Authorized by: Bette Butterfield MD    Intubation:     Induction:  Intravenous    Mask Ventilation:  Easy mask    Attempts:  1    Attempted By:  CRNA and student    Method of Intubation:  Direct    Blade:  Daugherty 2    Laryngeal View Grade: Grade I - full view of cords      Difficult Airway Encountered?: No      Complications:  None    Airway Device:  Oral endotracheal tube    Airway Device Size:  7.0    Style/Cuff Inflation:  Cuffed (inflated to minimal occlusive pressure)    Inflation Amount (mL):  6    Tube secured:  22    Secured at:  The lips    Placement Verified By:  Capnometry and Revisualization with laryngoscopy    Complicating Factors:  None    Findings Post-Intubation:  BS equal bilateral and atraumatic/condition of teeth unchanged  Notes:      Performed by GILSON Gray

## 2024-04-11 NOTE — TRANSFER OF CARE
Anesthesia Transfer of Care Note    Patient: Ellis Ramos    Procedure(s) Performed: Procedure(s) (LRB):  LAPAROSCOPY, DIAGNOSTIC (N/A)  CYSTOSCOPY (N/A)  CHROMOTUBATION, OVIDUCT (Bilateral)  HYSTEROSCOPY, WITH DILATION AND CURETTAGE OF UTERUS (N/A)  LEEP CONIZATION, CERVIX (N/A)    Patient location: PACU    Anesthesia Type: general    Transport from OR: Transported from OR on room air with adequate spontaneous ventilation    Post pain: adequate analgesia    Post assessment: no apparent anesthetic complications and tolerated procedure well    Post vital signs: stable    Level of consciousness: sedated    Nausea/Vomiting: no nausea/vomiting    Complications: none    Transfer of care protocol was followed      Last vitals: Visit Vitals  /72   Pulse 78   Temp 36.3 °C (97.3 °F)   Resp 20   Wt 68.1 kg (150 lb 3.2 oz)   LMP 02/14/2024 (Exact Date)   SpO2 100%   Breastfeeding No   BMI 24.24 kg/m²

## 2024-04-11 NOTE — H&P
H&P Interval Update    Patient seen and evaluated by myself and the staff attending this morning. In addition to the procedures as listed in her preop visit with Dr. Guerrero (see below), a LEEP will be completed due to high-grade cervical dysplasia noted on colposcopy on 04/05/24, and a hysteroscopy D&C will be completed for further evaluation of AUB of unknown etiology. The patient is able to express in her own words the procedures she will be undergoing today and wishes to proceed. No changes in PMH/ED visits/Hospital admissions since last visit (H&P noted below). NPO since yesterday. She is here today with her mother, Giuliana, who can be reached at [phone].     To the OR for LEEP, hysteroscopy D&C, diagnostic laparoscopy with chromopertubation and possible excision vs. fulguration of endometriosis, and cystoscopy.     [facility] Preoperative Visit History and Physical    HPI:   28 y.o. G0 with a history of uterine fibroid, b/l ovarian cysts, dysmenorrhea, and R pelvic pain.     Right-sided Pelvic pain   Onset was 12/2023, that is sharp and throbbing that radiates to the front and towards the back, however pain has present since about 12  years, when she had a cyst rupture and reports preseence of pelvic pain since  That's pelvic pain occurs 2-3x weekly that lasts about 2 hours every morning; nothing makes the pain worse but improved with lying down   Reports pain with intercourse upon insertion and deep penetration. She is unable to finish due to pain. Rpeorts is not sexually active due to pelvic pain and AUB  Has only tried OTC Tylenol and ibuprofen but minimal pain relief. Toradol had helped for a short period of time, but overtime worsended while on it.    Pt was seen previously in clinic on 12/04/23  Repeat TVUS showed Thickened, heterogeneous endometrium with increased vascularity; uterine fibroid measuring 1 cm, Heterogeneous endometrium measuring 1.5 cm in thickness with increased vascularity  within the endometrium  Previous TVUS on 22 - showed Endometrial stripe measures approximately 9 mm with some ill-defined areas and with some areas of increased vascularity   Despite patient being on Depo since 2023 (last given on 3/14/24) Reports contiued prolonged heavy cycles  Patient with initial discussion of possible hysteroscopy D&C, however new imaging with MRI demonstrates normal EMS.    Denies fever, chills, difficulty with urination, change in bowel habits/appetite, but admits to pelvic pain     GynHx:  [facility]: ;   LMP: 24-3/14/2024  Contraception: Depo Provera   Denies STIs/abnormal paps    ObHx:  OB History          0    Para   0    Term   0       0    AB   0    Living   0         SAB   0    IAB   0    Ectopic   0    Multiple   0    Live Births   0               PMH:  Past Medical History:   Diagnosis Date    Abnormal Pap smear of cervix      SurgHx:  Past Surgical History:   Procedure Laterality Date    CYST REMOVAL      On right foot     FamHx:  Family History   Problem Relation Age of Onset    Ovarian cancer Paternal Grandmother     Ovarian cancer Maternal Grandmother     Diabetes Maternal Aunt      Denies history of breast, endometrial, colon cancers.    SocialHx:  Social History     Socioeconomic History    Marital status: Single   Tobacco Use    Smoking status: Never     Passive exposure: Never    Smokeless tobacco: Current   Substance and Sexual Activity    Alcohol use: Not Currently     Comment: Occasionally    Drug use: Never    Sexual activity: Not Currently     Partners: Male     Birth control/protection: None   Occasional alcohol use  Denies tobacco/illicit drug use.    Works at [employer] in the [employer].  She lives with my girlfriend and feels safe at home.  She will be staying with her mother for recovery.    All:  Review of patient's allergies indicates:   Allergen Reactions    Amoxicillin Dermatitis     Other reaction(s): Rash     Meds:  Prior to Admission  medications    Not on File     Review of Systems: As stated in HPI.      Objective:     Vitals:    04/11/24 0504 04/11/24 0509 04/11/24 0523 04/11/24 0533   BP:  119/76     Pulse:  77     Resp:   16 16   Temp:  97.4 °F (36.3 °C)     TempSrc:  Oral     SpO2:  100%     Weight: 68.1 kg (150 lb 3.2 oz)        Body mass index is 24.24 kg/m².    PHYSICAL EXAM  GEN: NAD, A&O x3  CV: RRR   LUNGS: CTABL  ABDOMEN: soft, ND, no masses, mild tenderness in RLQ with positive Carnett's sign no SI joint tenderness  INCISIONS: none  VAGINA: Moist, no lesions or masses  VULVA: Normal external genitalia, decreased sensation on left side at the level of the mons pubis and inner thigh  CERVIX: Well visualized without any masses or lesions. No CMT. Os normal in appearance without bleeding or discharge.   UTERUS: 8 cm in size, mobile, not broad, smooth in contour  ADNEXA: No fullness, masses or tenderness  PELVIC FLOOR: levator ani ttp, obturator internus nttp, piriformis mm ttp; urethra  nttp; bladder neck ttp    LABS:  Last mammogram: N/A  Last pap: N/A  EMB: N/A  Colonoscopy: N/A    IMAGING:  MRI 2/25/2024  FINDINGS:  Uterus measures 8.5 x 4.5 x 4.8 cm.  There is a focus of increased T1 signal at the anterior inferior fundus best seen on image 20 series 11.  This could represent a small endometrioma..  There is a possible small fibroid anteriorly measuring 9 mm.  Endometrial stripe is normal.  Junctional zone normal.     There is a simple appearing thin walled left ovarian cyst measuring 2.1 cm with fluid signal.  Adnexa otherwise demonstrate a normal appearance.     No pelvic mass or lymphadenopathy.     Visualized bowel loops, rectum, bladder normal.     Musculoskeletal structures unremarkable.     No abnormal enhancement.     Impression:     Nodular focus of increased T1 signal measuring approximately 9 mm rising from the inferior aspect of the fundus which could represent a small uterine margin endometrioma versus a small  subserosal fibroid.     Suspected small anterior mid myometrial fibroid.     Simple appearing left ovarian cyst, 2.1 cm, a benign incidental finding.        Electronically signed by: Kary Park MD  Date:                                            03/15/2024  Time:                                           10:03    TVUS 2024     FINDINGS:  UTERUS/CERVIX:     Size: 7.4 x 4.4 x 3.9 cm  Masses: None  Endometrium: 14 mm, mildly heterogeneous.    RIGHT OVARY:  Size: 3 x 2.4 x 2.1 cm  Appearance: Dominant follicle measures 1.9 cm.  Vascular flow: Normal spectral waveforms.     LEFT OVARY:  Attempted visualization of the left ovary.  Left ovary not seen for unknown reason, possibly due to shadowing bowel gas and/or body habitus.     FREE FLUID:  None     Impression:  Mildly heterogeneous, thickened endometrium.    Electronically signed by: Adwoa Sim  Date:                                            2024  Time:                                           12:10    Assessment:      28 y.o.  with AUB and pelvic pain for surgical management.  1. Pelvic pain  Wet Prep, Genital    Urinalysis, Reflex to Urine Culture    Case Request Operating Room: LAPAROSCOPY, DIAGNOSTIC, CYSTOSCOPY, CHROMOTUBATION, OVIDUCT, DESTRUCTION, ENDOMETRIOSIS      2. Cervical cancer screening  Liquid-Based Pap Smear, Screening Screening      3. Preop testing  Type & Screen      4. Myalgia of pelvic floor        5. Adenomyosis        6. Fertility testing        7. Bladder pain            Plan:     No problem-specific Assessment & Plan notes found for this encounter.      Counseling: Alternatives to this planned procedure were explained to the patient including expectant, medical and other types of surgical management. She was counseled that this procedure is may improve her pain, however he pain is multifactorial and needs to be address in multiple ways. Discussed continuous suppression with oral hormonal regimen, IUD, Depo  provera, or implant. Discussed this can help address pain and bleeding, and does not take opportunity of future fertility.  We have reviewed the typical perioperative course with hospital stay, and post-operative precautions and restrictions have been reviewed.    Patient counseled on the fact that cystotomy complicates approximately 0.3 to 11/1000 benign gynecologic surgeries, especially urogynecologic procedures and hysterectomy.  Patient is aware that, in the event of cystotomy, continuous bladder drainage will be continued for 7-10 days with Parish catheter in place.   Counseled on ureteral injury rates of 0.2-7.3%, bowel injury rates of <1%.   Transfusion of blood and blood products discussed. Patient was consented for blood transfusion in the case of an emergency.  She understands the possibility of blood transfusion reaction and the attendant risk of transmission of HIV & Hepatitis C to be 1 in 2 million and the risk of Hepatitis B to be 1 in 200,000.  She is aware of the possibility of transfusion reaction. All questions were answered.   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  She has consented to an exam under anesthesia and understands that medical students participate in this portion of the procedure.  All questions were answered.    Preop testing ordered.  Surgery case requested. Surgical consents signed.  Instructions reviewed, including NPO after midnight.   Counseled to hold the following medications on the day of surgery: none   PAT appointment requested   Current contraception: Depo provera    To OR for Diagnostic lap with excision/fulguration of endometriosis, chromopertubation, and cystoscopy with Dr. Murrell     Scheduled on 4/11/2024    Discussed with Dr. Murrell.    Juwan Guerrero MD PGY3  Obstetrics & Gynecology   03/27/2024

## 2024-04-12 ENCOUNTER — PATIENT MESSAGE (OUTPATIENT)
Dept: ADMINISTRATIVE | Facility: OTHER | Age: 29
End: 2024-04-12
Payer: COMMERCIAL

## 2024-04-15 VITALS
BODY MASS INDEX: 24.24 KG/M2 | TEMPERATURE: 98 F | WEIGHT: 150.19 LBS | RESPIRATION RATE: 20 BRPM | SYSTOLIC BLOOD PRESSURE: 109 MMHG | HEART RATE: 64 BPM | OXYGEN SATURATION: 100 % | DIASTOLIC BLOOD PRESSURE: 74 MMHG

## 2024-04-17 ENCOUNTER — TELEPHONE (OUTPATIENT)
Dept: GYNECOLOGY | Facility: CLINIC | Age: 29
End: 2024-04-17
Payer: COMMERCIAL

## 2024-04-17 NOTE — TELEPHONE ENCOUNTER
----- Message from Sammy Ray sent at 4/17/2024  8:53 AM CDT -----  Regarding: Patient Results  The patient above called because she would like to get the results from her biopsy that was done on last Thursday. Please advise, Thanks.

## 2024-04-22 ENCOUNTER — OFFICE VISIT (OUTPATIENT)
Dept: GYNECOLOGY | Facility: CLINIC | Age: 29
End: 2024-04-22
Payer: COMMERCIAL

## 2024-04-22 ENCOUNTER — DOCUMENTATION ONLY (OUTPATIENT)
Dept: GYNECOLOGY | Facility: CLINIC | Age: 29
End: 2024-04-22
Payer: COMMERCIAL

## 2024-04-22 VITALS
WEIGHT: 151.38 LBS | HEART RATE: 81 BPM | OXYGEN SATURATION: 100 % | TEMPERATURE: 99 F | SYSTOLIC BLOOD PRESSURE: 113 MMHG | BODY MASS INDEX: 24.33 KG/M2 | DIASTOLIC BLOOD PRESSURE: 76 MMHG | HEIGHT: 66 IN | RESPIRATION RATE: 18 BRPM

## 2024-04-22 DIAGNOSIS — Z98.890 POSTOPERATIVE STATE: ICD-10-CM

## 2024-04-22 DIAGNOSIS — G58.9 NERVE ENTRAPMENT: Primary | ICD-10-CM

## 2024-04-22 PROCEDURE — 20552 NJX 1/MLT TRIGGER POINT 1/2: CPT | Mod: 79,PBBFAC

## 2024-04-22 PROCEDURE — 99213 OFFICE O/P EST LOW 20 MIN: CPT | Mod: PBBFAC,25

## 2024-04-22 RX ORDER — KETOROLAC TROMETHAMINE 10 MG/1
10 TABLET, FILM COATED ORAL EVERY 6 HOURS PRN
Qty: 20 TABLET | Refills: 0 | Status: SHIPPED | OUTPATIENT
Start: 2024-04-22 | End: 2024-04-27

## 2024-04-22 RX ORDER — LIDOCAINE 50 MG/G
1 PATCH TOPICAL DAILY
Qty: 7 PATCH | Refills: 0 | Status: SHIPPED | OUTPATIENT
Start: 2024-04-22

## 2024-04-22 RX ORDER — LIDOCAINE HYDROCHLORIDE 10 MG/ML
INJECTION INFILTRATION; PERINEURAL
Status: DISCONTINUED
Start: 2024-04-22 | End: 2024-04-22 | Stop reason: HOSPADM

## 2024-04-22 RX ORDER — LIDOCAINE HYDROCHLORIDE 10 MG/ML
10 INJECTION, SOLUTION EPIDURAL; INFILTRATION; INTRACAUDAL; PERINEURAL
Status: COMPLETED | OUTPATIENT
Start: 2024-04-22 | End: 2024-04-22

## 2024-04-22 RX ADMIN — LIDOCAINE HYDROCHLORIDE 10 MG: 10 INJECTION, SOLUTION EPIDURAL; INFILTRATION; INTRACAUDAL; PERINEURAL at 01:04

## 2024-04-22 NOTE — PROGRESS NOTES
[facility] NOTE  [facility]  [address] [place] [address]  Phone: [phone]  Fax: [phone]    Postoperative Follow-Up    Subjective:      Ellis Ramos is a 28 y.o.  s/p LEEP, Hysteroscopy D&C, Diagnostic laparoscopy, Chromotubation, and Cystoscopy with hydrodistension for Cervical dysplasia, Abnormal uterine bleeding, Pelvic pain, Uterine polyps, Interstitial cystitis, and desired fertility on 2024 who presents to the clinic 11 days post-op.    Patient reports RLQ pain, worsened with movement and hip flexion. Standing up and laying flat as much as possible to avoid pain. No erythema, discharge, masses noted in area. Ibuprofen and tylenol with minimal relief.     Patient with minimal light pink spotting and no abnormal discharge or carmen bleeding. Tolerating regular diet without nausea or vomiting. Having regular bowel movements and voiding spontaneously without issue.     Operative Findings:  Normal external female genitalia without lesion. Bimanual exam revealing 8 cm uterus deviated to right, anteverted, mobile. 3 cm beneath pubic arch, 8 cm between ischial spines, moderate descent noted. No adnexal masses or irregularity to contour of uterus noted. Vaginal mucosa pink and moist, cervix smooth, no lesions, abnormal discharge, or bleeding noted.     During CKC, cervix noted to be deviated to patient right. Application of Lugol's revealing area of decreased uptake circumferentially around transformation zone. Hysteroscopic evaluation revealing endocervical canal without lesion. Uterine cavity with diffuse polypoid tissue throughout.  Bilateral tubal ostia visualized.      On laparoscopy, no evidence of injury to underlying structures on survey. Normal liver edge, gallbladder, and stomach. Ovaries and fallopian tubes normal in appearance. Uterus 8 cm, with 6 5-10 mm submucosal fibroids visible on posterior and fundal surface of uterus. Ureters visualized bilaterally. No evidence of  "endometriosis on diaphragm, peritoneal surfaces, appendix, or pelvic organs.      Chromotubation revealing patent right tube and occluded left tube. No obvious lesion or mass visualized distorting tube.      Cystoscopy revealing no gross lesions of bladder. Bilateral ureteral orifices visualized. After hydrodistension, Hunner's lesions and petechiae noted diffusely throughout bladder.     Review of Systems  Denies fevers, chills, headache, blurry vision, nausea, vomiting, dizziness, or syncope.   Denies chest pain, shortness of breath, RUQ pain, or calf pain.     Objective:     Vitals:    04/22/24 1104   BP: 113/76   BP Location: Left arm   Patient Position: Sitting   BP Method: Medium (Automatic)   Pulse: 81   Resp: 18   Temp: 98.6 °F (37 °C)   TempSrc: Oral   SpO2: 100%   Weight: 68.7 kg (151 lb 6.4 oz)   Height: 5' 6" (1.676 m)     Body mass index is 24.44 kg/m².    Physical Exam:   General: Alert and oriented, in no acute distress  Lungs: Clear to auscultation bilaterally, no conversational dyspnea  Heart: Regular rate and rhythm  Abdomen: Soft, non-distended, tender to palpation in RLQ lateral in incision, no involuntary guarding, no rebound tenderness; incision in RLQ and umbilicus with Dermabond, clean and dry without erythema or discharge  Extremities: Atraumatic, non-edematous, no cords or calf tenderness, no significant calf/ankle edema    Pathology:  1. Cervix, LEFT CERVIX:   - High-grade squamous intraepithelial lesion (HSIL), moderate dysplasia.  - HSIL involves the ectocervical margin.    - The endocervical margin is negative for squamous intraepithelial lesion/dysplasia.       2. Cervix, RIGHT CERVIX:   - Low-grade squamous intraepithelial lesion (LSIL).    - LSIL involves the ectocervical margin.    - The endocervical margin is negative for squamous intraepithelial lesion/dysplasia.       3. Cervix, TOP HAT:   - Benign endocervical tissue.       4. Endocervix, ENDOCERVICAL CURRETTINGS:   - Scant " endocervical cells with predominantly blood and mucus.       5. Endometrium, ENDOMETRIAL POLYP:   - Endometrial polyp.  - Benign endometrium with exogenous progestin effect.  - No evidence of malignancy.       6. Endometrium, ENDOMETRIAL CURRETTINGS:   - Benign endometrium with exogenous progestin effect and stromal breakdown.  - No evidence of malignancy.        Procedure: trigger point injection  Patient consented and Time Out performed prior to procedure. Skin in RLQ  disinfected. 2 mL of lidocaine 1% without epinephrine injected to right of RLQ incision. Patient with pain relief noted. Remaining 8 mL of lidocaine slowly injected in area. Injection site covered with band aid. Patient tolerated procedure well.     Assessment:     Ellis Ramos is a 28 y.o.  s/p LEEP, Hysteroscopy D&C, Diagnostic laparoscopy, Chromotubation, and Cystoscopy with hydrodistension for Cervical dysplasia, Abnormal uterine bleeding, Pelvic pain, Uterine polyps, Interstitial cystitis, and desired fertility on 2024 who presents to the clinic 11 days post-op.     Overall doing well post op. 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      Plan:     Nerve entrapment: pain improved with injection of lidocaine, see procedure note above. Lidocaine patches and toradol prescribed and patient advised to use heat as needed  Operative findings and pathology reviewed with patient  Follow up s/p LEEP: plan for repeat colposcopy in 6 months, appointment requested   Diagnosis of interstitial cystitis and IC diet discussed, patient with minimal bladder discomfort at this time  Left fallopian tube occlusion discussed   Continue any current medications.  Wound care discussed.  Activity restrictions: pelvic rest, no soaking in hot tubs/ baths/pools  Anticipated return to work: Now  Follow up: 1 week, will reevaluate pain at that time    Patient and plan were discussed with   Nyasia.    Dorina Pratt MD  [facility], PGY-2

## 2024-04-23 NOTE — PROGRESS NOTES
Pathology Conference      Surgery Date: 4/11/2024   Patient: Ellis Ramos     Pre-Op Diagnosis: Cervical dysplasia, AUB, pelvic pain, bladder pain   Procedure: LEEP, hysteroscopy D&C, diagnostic laparoscopy, chromotubation, cystoscopy with hydrodistention     Path:    1. Cervix, LEFT CERVIX:    - High-grade squamous intraepithelial lesion (HSIL), moderate dysplasia.   - HSIL involves the ectocervical margin.     - The endocervical margin is negative for squamous intraepithelial lesion/dysplasia.     2. Cervix, RIGHT CERVIX:    - Low-grade squamous intraepithelial lesion (LSIL).     - LSIL involves the ectocervical margin.     - The endocervical margin is negative for squamous intraepithelial lesion/dysplasia.     3. Cervix, TOP HAT:    - Benign endocervical tissue.     4. Endocervix, ENDOCERVICAL CURRETTINGS:    - Scant endocervical cells with predominantly blood and mucus.     5. Endometrium, ENDOMETRIAL POLYP:    - Endometrial polyp.   - Benign endometrium with exogenous progestin effect.   - No evidence of malignancy.     6. Endometrium, ENDOMETRIAL CURRETTINGS:    - Benign endometrium with exogenous progestin effect and stromal breakdown.   - No evidence of malignancy.        Plan: repeat colposcopy in 6 months (preferred) vs repeat excision      Patient and plan discussed with Dr. Murrell.     Dorina Pratt MD  [facility], PGY-2

## 2024-04-29 ENCOUNTER — OFFICE VISIT (OUTPATIENT)
Dept: GYNECOLOGY | Facility: CLINIC | Age: 29
End: 2024-04-29
Payer: COMMERCIAL

## 2024-04-29 VITALS
SYSTOLIC BLOOD PRESSURE: 101 MMHG | HEIGHT: 66 IN | TEMPERATURE: 98 F | BODY MASS INDEX: 24.34 KG/M2 | WEIGHT: 151.44 LBS | DIASTOLIC BLOOD PRESSURE: 71 MMHG | HEART RATE: 68 BPM | OXYGEN SATURATION: 100 % | RESPIRATION RATE: 18 BRPM

## 2024-04-29 DIAGNOSIS — Z23 NEED FOR HPV VACCINE: ICD-10-CM

## 2024-04-29 DIAGNOSIS — N87.9 CERVICAL DYSPLASIA: ICD-10-CM

## 2024-04-29 DIAGNOSIS — Z98.890 POSTOPERATIVE STATE: Primary | ICD-10-CM

## 2024-04-29 PROCEDURE — 99213 OFFICE O/P EST LOW 20 MIN: CPT | Mod: PBBFAC,25

## 2024-04-29 PROCEDURE — 90471 IMMUNIZATION ADMIN: CPT | Mod: PBBFAC

## 2024-04-29 PROCEDURE — 90651 9VHPV VACCINE 2/3 DOSE IM: CPT | Mod: PBBFAC

## 2024-04-29 RX ADMIN — HUMAN PAPILLOMAVIRUS 9-VALENT VACCINE, RECOMBINANT 0.5 ML: 30; 40; 60; 40; 20; 20; 20; 20; 20 INJECTION, SUSPENSION INTRAMUSCULAR at 09:04

## 2024-04-29 NOTE — PROGRESS NOTES
[facility]  [facility]  [address] [place] [address]  Phone: [phone]  Fax: [phone]    Postoperative Follow-Up    Subjective:      Ellis Ramos is a 28 y.o.  s/p LEEP, Hysteroscopy D&C, Diagnostic laparoscopy, Chromotubation, and Cystoscopy with hydrodistension for Cervical dysplasia, Abnormal uterine bleeding, Pelvic pain, Uterine polyps, Interstitial cystitis, and desired fertility on 2024 who presents to the clinic 11 days post-op.    Patient reports significant improvement in RLQ pain after trigger point injection. Denies issues with incisions. Did move awkwardly and noticed a pulling sensation at her navel.    Denies vaginal bleeding. Tolerating regular diet without nausea or vomiting. Having regular bowel movements and voiding spontaneously without issue.     Operative Findings:  Normal external female genitalia without lesion. Bimanual exam revealing 8 cm uterus deviated to right, anteverted, mobile. 3 cm beneath pubic arch, 8 cm between ischial spines, moderate descent noted. No adnexal masses or irregularity to contour of uterus noted. Vaginal mucosa pink and moist, cervix smooth, no lesions, abnormal discharge, or bleeding noted.     During CKC, cervix noted to be deviated to patient right. Application of Lugol's revealing area of decreased uptake circumferentially around transformation zone. Hysteroscopic evaluation revealing endocervical canal without lesion. Uterine cavity with diffuse polypoid tissue throughout.  Bilateral tubal ostia visualized.      On laparoscopy, no evidence of injury to underlying structures on survey. Normal liver edge, gallbladder, and stomach. Ovaries and fallopian tubes normal in appearance. Uterus 8 cm, with 6 5-10 mm submucosal fibroids visible on posterior and fundal surface of uterus. Ureters visualized bilaterally. No evidence of endometriosis on diaphragm, peritoneal surfaces, appendix, or pelvic organs.      Chromotubation revealing  "patent right tube and occluded left tube. No obvious lesion or mass visualized distorting tube.      Cystoscopy revealing no gross lesions of bladder. Bilateral ureteral orifices visualized. After hydrodistension, Hunner's lesions and petechiae noted diffusely throughout bladder.     Review of Systems  Denies fevers, chills, headache, blurry vision, nausea, vomiting, dizziness, or syncope.   Denies chest pain, shortness of breath, RUQ pain, or calf pain.     Objective:     Vitals:    04/29/24 0931   BP: 101/71   Pulse: 68   Resp: 18   Temp: 98.4 °F (36.9 °C)   TempSrc: Oral   SpO2: 100%   Weight: 68.7 kg (151 lb 7.3 oz)   Height: 5' 6" (1.676 m)     Body mass index is 24.45 kg/m².    Physical Exam:   General: Alert and oriented, in no acute distress  Lungs: Clear to auscultation bilaterally, no conversational dyspnea  Heart: Regular rate and rhythm  Abdomen: Soft, non-distended, non-tender; laparoscopic incisions healing well without erythema, induration, discharge  Extremities: Atraumatic, non-edematous, no cords or calf tenderness, no significant calf/ankle edema    Pathology:  1. Cervix, LEFT CERVIX:   - High-grade squamous intraepithelial lesion (HSIL), moderate dysplasia.  - HSIL involves the ectocervical margin.    - The endocervical margin is negative for squamous intraepithelial lesion/dysplasia.       2. Cervix, RIGHT CERVIX:   - Low-grade squamous intraepithelial lesion (LSIL).    - LSIL involves the ectocervical margin.    - The endocervical margin is negative for squamous intraepithelial lesion/dysplasia.       3. Cervix, TOP HAT:   - Benign endocervical tissue.       4. Endocervix, ENDOCERVICAL CURRETTINGS:   - Scant endocervical cells with predominantly blood and mucus.       5. Endometrium, ENDOMETRIAL POLYP:   - Endometrial polyp.  - Benign endometrium with exogenous progestin effect.  - No evidence of malignancy.       6. Endometrium, ENDOMETRIAL CURRETTINGS:   - Benign endometrium with exogenous " progestin effect and stromal breakdown.  - No evidence of malignancy.        Assessment:     Ellis Ramos is a 28 y.o.  s/p LEEP, Hysteroscopy D&C, Diagnostic laparoscopy, Chromotubation, and Cystoscopy with hydrodistension for Cervical dysplasia, Abnormal uterine bleeding, Pelvic pain, Uterine polyps, Interstitial cystitis, and desired fertility on 2024 who presents to the clinic for f/up trigger point injection. Pain much improved.     Plan:     Operative findings and pathology reviewed with patient  Follow up s/p LEEP: plan for repeat colposcopy in 6 months, appointment requested   Diagnosis of interstitial cystitis and IC diet discussed, patient with minimal bladder discomfort at this time  Left fallopian tube occlusion discussed   Continue any current medications.  Wound care discussed.  Activity restrictions: pelvic rest, no soaking in hot tubs/ baths/pools  Anticipated return to work: Now  Follow up:   Future Appointments   Date Time Provider Department Center   2024  8:30 AM RESIDENTS, [facility] Jo[facility] Lyle   2024 10:20 AM NURSE, [facility] Lyle   2024 11:10 AM NURSE, [facility] Lyle   2024  9:10 AM [facility]    10/23/2024 10:45 AM RESIDENTS, [facility]    10/31/2024 10:[facility]         Patient and plan were discussed with Dr. Murrell.    Symone Kingsley MD, PGY-4  [facility]  2024 9:55 AM

## 2024-04-29 NOTE — PROGRESS NOTES
Administered gardasil vaccine (0.5mL) in left deltoid, per MD orders. Pt tolerated well and waited 15 minutes clinic shot time.

## 2024-05-06 ENCOUNTER — TELEPHONE (OUTPATIENT)
Dept: GYNECOLOGY | Facility: CLINIC | Age: 29
End: 2024-05-06
Payer: COMMERCIAL

## 2024-05-06 NOTE — TELEPHONE ENCOUNTER
Called patient to notify that [facility] needed additional information regarding 5% lido patches but patient states that she doesn't need them because the shot really helped. I did tell her about the 4% patches OTC at [facility].

## 2024-05-09 ENCOUNTER — TELEPHONE (OUTPATIENT)
Dept: GYNECOLOGY | Facility: CLINIC | Age: 29
End: 2024-05-09
Payer: COMMERCIAL

## 2024-05-09 NOTE — TELEPHONE ENCOUNTER
----- Message from Sammy Ray sent at 5/9/2024  1:02 PM CDT -----  Regarding: Patient Call Back  Good afternoon,     Please give the patient above a call she stated that she received a shot the other day and is now having burning in her right side. Please advise, Thanks

## 2024-05-09 NOTE — TELEPHONE ENCOUNTER
Patient stated having pain in her right pelvic area, with burning sensation. Patient rated it her pain level 7-8/10. Patient voiced that the pain started this morning. Patient requests a new appointment(trigger point). Please advise. Thanks!

## 2024-05-13 ENCOUNTER — TELEPHONE (OUTPATIENT)
Dept: GYNECOLOGY | Facility: CLINIC | Age: 29
End: 2024-05-13
Payer: COMMERCIAL

## 2024-05-13 NOTE — TELEPHONE ENCOUNTER
Patient called and left a  on my line reporting that the pain she has been experiencing has returned. She had a trigger point injection on 4/22/24 which improved the pain thus far.     She noticed the pain returned last week. She wants to know if this pain she is experiencing will be permament? Will she have to continue to get injections? She wanted a call back at [phone].     Does patient need to be set up for a inperson visit to discuss or will call just call her over of the phone? Please advise. Thank you!

## 2024-05-17 ENCOUNTER — OFFICE VISIT (OUTPATIENT)
Dept: GYNECOLOGY | Facility: CLINIC | Age: 29
End: 2024-05-17
Payer: COMMERCIAL

## 2024-05-17 VITALS
HEIGHT: 66 IN | SYSTOLIC BLOOD PRESSURE: 101 MMHG | RESPIRATION RATE: 18 BRPM | WEIGHT: 155 LBS | OXYGEN SATURATION: 100 % | TEMPERATURE: 98 F | HEART RATE: 73 BPM | DIASTOLIC BLOOD PRESSURE: 74 MMHG | BODY MASS INDEX: 24.91 KG/M2

## 2024-05-17 DIAGNOSIS — R10.33 PERIUMBILICAL ABDOMINAL PAIN: Primary | ICD-10-CM

## 2024-05-17 DIAGNOSIS — G58.9 NERVE ENTRAPMENT: ICD-10-CM

## 2024-05-17 DIAGNOSIS — L03.90 CELLULITIS, UNSPECIFIED CELLULITIS SITE: ICD-10-CM

## 2024-05-17 PROCEDURE — 99213 OFFICE O/P EST LOW 20 MIN: CPT | Mod: PBBFAC

## 2024-05-17 PROCEDURE — 20552 NJX 1/MLT TRIGGER POINT 1/2: CPT | Mod: PBBFAC | Performed by: STUDENT IN AN ORGANIZED HEALTH CARE EDUCATION/TRAINING PROGRAM

## 2024-05-17 RX ORDER — GABAPENTIN 300 MG/1
300 CAPSULE ORAL NIGHTLY
Qty: 30 CAPSULE | Refills: 5 | Status: SHIPPED | OUTPATIENT
Start: 2024-05-17 | End: 2024-06-19 | Stop reason: HOSPADM

## 2024-05-17 RX ORDER — LIDOCAINE HYDROCHLORIDE 10 MG/ML
5 INJECTION, SOLUTION EPIDURAL; INFILTRATION; INTRACAUDAL; PERINEURAL
Status: DISCONTINUED | OUTPATIENT
Start: 2024-05-17 | End: 2024-05-17

## 2024-05-17 RX ORDER — ONDANSETRON 4 MG/1
4 TABLET, ORALLY DISINTEGRATING ORAL EVERY 6 HOURS PRN
Qty: 20 TABLET | Refills: 0 | Status: SHIPPED | OUTPATIENT
Start: 2024-05-17

## 2024-05-17 RX ORDER — LIDOCAINE HYDROCHLORIDE 10 MG/ML
5 INJECTION, SOLUTION EPIDURAL; INFILTRATION; INTRACAUDAL; PERINEURAL
Status: COMPLETED | OUTPATIENT
Start: 2024-05-17 | End: 2024-05-17

## 2024-05-17 RX ORDER — SULFAMETHOXAZOLE AND TRIMETHOPRIM 800; 160 MG/1; MG/1
1 TABLET ORAL 2 TIMES DAILY
Qty: 14 TABLET | Refills: 0 | Status: SHIPPED | OUTPATIENT
Start: 2024-05-17 | End: 2024-05-24

## 2024-05-17 RX ORDER — LIDOCAINE HYDROCHLORIDE 10 MG/ML
10 INJECTION, SOLUTION EPIDURAL; INFILTRATION; INTRACAUDAL; PERINEURAL
Status: DISPENSED | OUTPATIENT
Start: 2024-05-17 | End: 2024-05-17

## 2024-05-17 RX ORDER — TRIAMCINOLONE ACETONIDE 40 MG/ML
40 INJECTION, SUSPENSION INTRA-ARTICULAR; INTRAMUSCULAR
Status: COMPLETED | OUTPATIENT
Start: 2024-05-17 | End: 2024-05-17

## 2024-05-17 RX ADMIN — LIDOCAINE HYDROCHLORIDE 50 MG: 10 INJECTION, SOLUTION EPIDURAL; INFILTRATION; INTRACAUDAL; PERINEURAL at 11:05

## 2024-05-17 RX ADMIN — TRIAMCINOLONE ACETONIDE 40 MG: 40 INJECTION, SUSPENSION INTRA-ARTICULAR; INTRAMUSCULAR at 11:05

## 2024-05-17 RX ADMIN — LIDOCAINE HYDROCHLORIDE 50 MG: 10 INJECTION, SOLUTION EPIDURAL; INFILTRATION; INTRACAUDAL; PERINEURAL at 02:05

## 2024-05-17 NOTE — LETTER
May 17, 2024      [facility]  [address] [place] [address]  Phone: [phone]       Patient: Ellis Ramos   YOB: 1995  Date of Visit: 05/17/2024    To Whom It May Concern:    Rafaela Ramos  was at [facility] on 05/17/2024. The patient may return to work/school on 06/17/2024 with restrictions. Patient cannot lift or push heavy objects greater than 5-10 pounds. Patient cannot over extend to grab objects overhead. Patient cannot have prolonged sitting for greater than 30 minutes to an hour at a time.If you have any questions or concerns, or if I can be of further assistance, please do not hesitate to contact me.    Sincerely,    Jaqueline Murrell MD

## 2024-05-17 NOTE — LETTER
May 17, 2024      [facility]  [address] [address]  Phone: [phone]       Patient: Ellis Ramos   YOB: 1995  Date of Visit: 05/17/2024    To Whom It May Concern:    Rafaela Ramos  was at [facility] on 05/17/2024. Patient has a post op complication. The patient may return to work/school on 06/14/2024 with no restrictions, provider no further complications. If you have any questions or concerns, or if I can be of further assistance, please do not hesitate to contact me.    Sincerely,    Jaqueline vidales MD

## 2024-05-17 NOTE — PROGRESS NOTES
[facility]  [facility]  [address] [place] [address]  Phone: [phone]  Fax: [phone]    Postoperative Follow-Up    Subjective:      Ellis Ramos is a 28 y.o.  s/p LEEP, Hysteroscopy D&C, Diagnostic laparoscopy, Chromotubation, and Cystoscopy with hydrodistension for Cervical dysplasia, Abnormal uterine bleeding, Pelvic pain, Uterine polyps, Interstitial cystitis, and desired fertility on 2024 who presents to the clinic postop for reoccuring RLQ pain, previously treated and improved with trigger point injection.    Patient initially reported significant improvement in RLQ pain after trigger point injection however pain has returned.  Reports electric shock-like pain that sometimes radiates down front of her leg. She is also reporting umbilicus soreness that started at her last visit but has gotten worse. She is unable to work at this time because her job at [employer] requires heavy lifting and lots of walking.    Denies vaginal bleeding. Tolerating regular diet without nausea or vomiting. Having regular bowel movements and voiding spontaneously without issue.     Operative Findings:  Normal external female genitalia without lesion. Bimanual exam revealing 8 cm uterus deviated to right, anteverted, mobile. 3 cm beneath pubic arch, 8 cm between ischial spines, moderate descent noted. No adnexal masses or irregularity to contour of uterus noted. Vaginal mucosa pink and moist, cervix smooth, no lesions, abnormal discharge, or bleeding noted.     During CKC, cervix noted to be deviated to patient right. Application of Lugol's revealing area of decreased uptake circumferentially around transformation zone. Hysteroscopic evaluation revealing endocervical canal without lesion. Uterine cavity with diffuse polypoid tissue throughout.  Bilateral tubal ostia visualized.      On laparoscopy, no evidence of injury to underlying structures on survey. Normal liver edge, gallbladder, and stomach.  "Ovaries and fallopian tubes normal in appearance. Uterus 8 cm, with 6 5-10 mm submucosal fibroids visible on posterior and fundal surface of uterus. Ureters visualized bilaterally. No evidence of endometriosis on diaphragm, peritoneal surfaces, appendix, or pelvic organs.      Chromotubation revealing patent right tube and occluded left tube. No obvious lesion or mass visualized distorting tube.      Cystoscopy revealing no gross lesions of bladder. Bilateral ureteral orifices visualized. After hydrodistension, Hunner's lesions and petechiae noted diffusely throughout bladder.     Review of Systems  Denies fevers, chills, headache, blurry vision, nausea, vomiting, dizziness, or syncope.   Denies chest pain, shortness of breath, RUQ pain, or calf pain.     Objective:     Vitals:    05/17/24 0950   BP: 101/74   BP Location: Left arm   Patient Position: Sitting   BP Method: Small (Automatic)   Pulse: 73   Resp: 18   Temp: 98.4 °F (36.9 °C)   TempSrc: Oral   SpO2: 100%   Weight: 70.3 kg (155 lb)   Height: 5' 6" (1.676 m)       Body mass index is 25.02 kg/m².    Physical Exam:   General: Alert and oriented, in no acute distress  Lungs: Clear to auscultation bilaterally, no conversational dyspnea  Heart: Regular rate and rhythm  Abdomen: Soft, non-distended, non-tender; laparoscopic incisions healing well without erythema, induration, discharge  Umbilical tenderness both to the right and inferior to umbilicus incision.  No mass or hernia appreciated.  RLQ incision ttp and causes significant discomfort.  Extremities: Atraumatic, non-edematous, no cords or calf tenderness, no significant calf/ankle edema    Procedure: trigger point injection    - Consent obtained. Time out performed.  - RLQ trocar site cleansed with alcohol x1  - 10mL 1% lidocaine without epinephrine mixed with 40mg/1mL triamcinolone.  - Trigger point injection performed via one injection. Dermis was injected then dry needling performed to find areas of " pain and those areas were injected.  Once no further areas identified, remaining lidocaine/triamcinolone injected into general subQ tissue for additional relief.  - Patient tolerated without complication.    Pathology:  1. Cervix, LEFT CERVIX:   - High-grade squamous intraepithelial lesion (HSIL), moderate dysplasia.  - HSIL involves the ectocervical margin.    - The endocervical margin is negative for squamous intraepithelial lesion/dysplasia.       2. Cervix, RIGHT CERVIX:   - Low-grade squamous intraepithelial lesion (LSIL).    - LSIL involves the ectocervical margin.    - The endocervical margin is negative for squamous intraepithelial lesion/dysplasia.       3. Cervix, TOP HAT:   - Benign endocervical tissue.       4. Endocervix, ENDOCERVICAL CURRETTINGS:   - Scant endocervical cells with predominantly blood and mucus.       5. Endometrium, ENDOMETRIAL POLYP:   - Endometrial polyp.  - Benign endometrium with exogenous progestin effect.  - No evidence of malignancy.       6. Endometrium, ENDOMETRIAL CURRETTINGS:   - Benign endometrium with exogenous progestin effect and stromal breakdown.  - No evidence of malignancy.        Assessment:     Ellis Ramos is a 28 y.o.  s/p LEEP, Hysteroscopy D&C, Diagnostic laparoscopy, Chromotubation, and Cystoscopy with hydrodistension for Cervical dysplasia, Abnormal uterine bleeding, Pelvic pain, Uterine polyps, Interstitial cystitis, and desired fertility on 2024 who presents to the clinic for f/up trigger point injection for nerve entrapment of RLQ trocar incision.    Also concern for worsening umbilical pain. Will obtain CT a/p and treat empirically for cellulitis.      Plan:     Problem List Items Addressed This Visit          Neuro    Nerve entrapment     Discussed nerve entrapment requires time for healing, sometimes up to or exceeding 12 months. Unfortunately, no surgical intervention will help with relieving pain as the nerve is at the fascia and no suture  was placed at the fascia, only the dermis.     Counseled patient on need for multiple trigger point injections. Patient amenable to injection today, performed with lidocaine and Kenalog. Improved pain upon injection. No more than 3 injections with steroid given risk for adrenal crisis.    Patient to continue PRN lidocaine patches to the area and will start nightly gabapentin 300mg as well.          Other Visit Diagnoses       Periumbilical abdominal pain    -  Primary    Relevant Medications    LIDOcaine (PF) 10 mg/ml (1%) injection 50 mg (Completed)    Other Relevant Orders    CT Abdomen Pelvis W Wo Contrast    Cellulitis, unspecified cellulitis site                    Operative findings and pathology reviewed with patient  Follow up s/p LEEP: plan for repeat colposcopy in 6 months, appointment requested   Diagnosis of interstitial cystitis and IC diet discussed, patient with minimal bladder discomfort at this time  Left fallopian tube occlusion discussed   Continue any current medications.  Wound care discussed.  Activity restrictions: pelvic rest, no soaking in hot tubs/ baths/pools  Anticipated return to work: 2 weeks  Follow up:   Future Appointments   Date Time Provider Department Center   5/24/2024 11:15 AM [facility]ID[facility], [facility] GYN [place]    5/31/2024  1:30 PM [facility] CT1 450 LB LIMIT [facility] CTSCN [place]    6/19/2024  1:15 PM [facility], [facility] GYN [place]   7/11/2024 11:10 AM NURSE, [facility] GYN [place]   7/25/2024  9:10 AM [facility]    10/23/2024 10:45 AM [facility], [facility] [place]    10/31/2024 10:00 [facility]         Patient and plan were discussed with Dr. Murrell.    Symone Kingsley MD, PGY-4  [facility]  05/19/2024 9:20 PM

## 2024-05-20 NOTE — ASSESSMENT & PLAN NOTE
Discussed nerve entrapment requires time for healing, sometimes up to or exceeding 12 months. Unfortunately, no surgical intervention will help with relieving pain as the nerve is at the fascia and no suture was placed at the fascia, only the dermis.     Counseled patient on need for multiple trigger point injections. Patient amenable to injection today, performed with lidocaine and Kenalog. Improved pain upon injection. No more than 3 injections with steroid given risk for adrenal crisis.    Patient to continue PRN lidocaine patches to the area and will start nightly gabapentin 300mg as well.

## 2024-05-24 ENCOUNTER — CLINICAL SUPPORT (OUTPATIENT)
Dept: GYNECOLOGY | Facility: CLINIC | Age: 29
End: 2024-05-24
Payer: COMMERCIAL

## 2024-05-24 ENCOUNTER — TELEPHONE (OUTPATIENT)
Dept: GYNECOLOGY | Facility: CLINIC | Age: 29
End: 2024-05-24
Payer: COMMERCIAL

## 2024-05-24 DIAGNOSIS — G58.9 NERVE ENTRAPMENT: Primary | ICD-10-CM

## 2024-05-24 DIAGNOSIS — N87.9 CERVICAL DYSPLASIA: ICD-10-CM

## 2024-05-24 NOTE — TELEPHONE ENCOUNTER
Hi,    After a SX date for CKC is chosen,  let me know and I will set up pre-op and such.....[place]a              ----- Message from Symone Kingsley MD sent at 5/24/2024  1:09 PM CDT -----  Regarding: Needs repeat excision  Patient s/p LEEP with positive margins. Needs CKC.    Symone Kingsley MD, PGY-4  [facility]  05/24/2024 1:10 PM

## 2024-05-24 NOTE — PROGRESS NOTES
[facility] Progress Note    Primary Site: [facility]  Patient location: Home  Physician location: In office      Chief Complaint: f/up RLQ pain/nerve entrapment    HPI  Ellis Ramos joined me via our telemedicine platform.  confirmed.    Reports last trigger point gave her about 3-4 days of relief. Lidocaine patch is helped. Gabapentin made her feel crazy so she isn't using.     I have reviewed family history, social history, medications and allergies as documented in the patient's electronic medical record.      Reports, labs, outside records, and images have been reviewed with pertinent interpretations below. See telemedicine notes records for intervening communications.        Assessment/Plan  Problem List Items Addressed This Visit          Neuro    Nerve entrapment - Primary     Continue trigger points, next appointment in 3 weeks.  Continue other conservative medication management options.              See correspondence above for plan.   Patient's needs assessed and health education provided. Patient understands risks, benefits, and alternatives of treatment prescribed above. Patient verbalizes understanding and agrees to follow plan.    Patient's identity was confirmed and confidentiality/privacy confirmed prior to visit. I certify that this visit was done via secure two-way transmission with informed consent of the patient and/or guardian.  Each patient to whom I provide medical services by telemedicine is:  (1) informed of the relationship between the physician and patient and the respective role of any other health care provider with respect to management of the patient; and (2) notified that they may decline to receive medical services by telemedicine and may withdraw from such care at any time. Patient verbally consented to receive this service via voice-only telephone call.    Audio only was selected because there was no capability for video conferencing with patient. Patient  unavailable via virtual visit.    Symone Kingsley MD, PGY-4  [facility]  05/27/2024 4:48 PM

## 2024-05-27 NOTE — ASSESSMENT & PLAN NOTE
Continue trigger points, next appointment in 3 weeks.  Continue other conservative medication management options.

## 2024-05-31 ENCOUNTER — HOSPITAL ENCOUNTER (OUTPATIENT)
Dept: RADIOLOGY | Facility: HOSPITAL | Age: 29
Discharge: HOME | End: 2024-05-31
Attending: STUDENT IN AN ORGANIZED HEALTH CARE EDUCATION/TRAINING PROGRAM
Payer: COMMERCIAL

## 2024-05-31 DIAGNOSIS — R10.33 PERIUMBILICAL ABDOMINAL PAIN: ICD-10-CM

## 2024-05-31 PROCEDURE — 25500020 PHARM REV CODE 255: Performed by: STUDENT IN AN ORGANIZED HEALTH CARE EDUCATION/TRAINING PROGRAM

## 2024-05-31 PROCEDURE — 74178 CT ABD&PLV WO CNTR FLWD CNTR: CPT | Mod: TC

## 2024-05-31 RX ADMIN — IOHEXOL 100 ML: 350 INJECTION, SOLUTION INTRAVENOUS at 02:05

## 2024-06-02 ENCOUNTER — TELEPHONE (OUTPATIENT)
Dept: GYNECOLOGY | Facility: CLINIC | Age: 29
End: 2024-06-02
Payer: COMMERCIAL

## 2024-06-02 NOTE — TELEPHONE ENCOUNTER
LEEP with positive margins. Needing CKC.    Called x2, LVM to call clinic to set up a date for surgery.    Viktor Christensen MD  [facility]-PGY3  06/02/2024 5:16 PM

## 2024-06-03 ENCOUNTER — TELEPHONE (OUTPATIENT)
Dept: GYNECOLOGY | Facility: CLINIC | Age: 29
End: 2024-06-03
Payer: COMMERCIAL

## 2024-06-03 DIAGNOSIS — N87.9 CERVICAL DYSPLASIA: Primary | ICD-10-CM

## 2024-06-03 NOTE — TELEPHONE ENCOUNTER
Pt sent to PreOp resident for CKC. Per chart review, LEEP with positive margins. Per originaly discussion with gyn team and Dr. Murrell, plan was to repeat colpo in 6 months, not for re-excision. Confirmed with Dr. Kingsley, plan is actually to repeat colpo in 6 months, [facility]. Pt voices understanding. All questions answered.      Viktor Christensen MD  [facility]  06/03/2024 5:16 PM

## 2024-06-03 NOTE — TELEPHONE ENCOUNTER
----- Message from Symone Kingsley MD sent at 6/1/2024  8:22 AM CDT -----  Regarding: Call to notify CT negative  Please call patient and let her know her CT shows no sign of hernia or infection. Pain likely 2/2 surgical postop healing. Advise to continue using the lidocaine patches and can do gentle massage of painful incisions.    Thanks,  Symone Kingsley MD, PGY-4  [facility]  06/01/2024 8:23 AM  ----- Message -----  From: Interface, Rad Results In  Sent: 5/31/2024   3:17 PM CDT  To: Symone Kingsley MD      Gave patient these results. Also told her that Dr Aviles is going to call her later for surgery scheduling [facility] which is separate from this CT. Reassurance given.

## 2024-06-07 ENCOUNTER — TELEPHONE (OUTPATIENT)
Dept: GYNECOLOGY | Facility: CLINIC | Age: 29
End: 2024-06-07
Payer: COMMERCIAL

## 2024-06-07 NOTE — TELEPHONE ENCOUNTER
Pt sent papers for us to fill out to extend her leave from work. Pt voiced that Dr. Murrell extended her leave to 6/20/2024 because she is still in pain from her surgery. Pt was sent home with an excuse from the 5/17/2024 appt. The excuse says pt can return to work on 6/14/2024. Please advise if she can stay out of work till the 14th or the 20th. Thank you.

## 2024-06-09 NOTE — TELEPHONE ENCOUNTER
Per Dr. Kingsley's note from 5/17, anticipated return to work was listed as 2 weeks from that appointment so if her work excuse from that appointment day says 6/14, I would defer to that.     Tawnya Leos MD  [facility] - PGY-2  3:45 PM 06/09/2024

## 2024-06-19 ENCOUNTER — OFFICE VISIT (OUTPATIENT)
Dept: GYNECOLOGY | Facility: CLINIC | Age: 29
End: 2024-06-19
Payer: COMMERCIAL

## 2024-06-19 VITALS
RESPIRATION RATE: 18 BRPM | HEART RATE: 102 BPM | DIASTOLIC BLOOD PRESSURE: 72 MMHG | SYSTOLIC BLOOD PRESSURE: 102 MMHG | BODY MASS INDEX: 25.33 KG/M2 | TEMPERATURE: 98 F | OXYGEN SATURATION: 100 % | WEIGHT: 157.63 LBS | HEIGHT: 66 IN

## 2024-06-19 DIAGNOSIS — G58.9 NERVE ENTRAPMENT: Primary | ICD-10-CM

## 2024-06-19 DIAGNOSIS — N93.9 ABNORMAL UTERINE BLEEDING (AUB): ICD-10-CM

## 2024-06-19 DIAGNOSIS — K42.9 UMBILICAL HERNIA WITHOUT OBSTRUCTION AND WITHOUT GANGRENE: ICD-10-CM

## 2024-06-19 PROCEDURE — 20552 NJX 1/MLT TRIGGER POINT 1/2: CPT | Mod: PBBFAC | Performed by: OBSTETRICS & GYNECOLOGY

## 2024-06-19 PROCEDURE — 99214 OFFICE O/P EST MOD 30 MIN: CPT | Mod: PBBFAC,25

## 2024-06-19 RX ORDER — MEDROXYPROGESTERONE ACETATE 150 MG/ML
150 INJECTION, SUSPENSION INTRAMUSCULAR
Status: COMPLETED | OUTPATIENT
Start: 2024-06-19 | End: 2024-06-19

## 2024-06-19 RX ORDER — GABAPENTIN 100 MG/1
100 CAPSULE ORAL NIGHTLY
Qty: 30 CAPSULE | Refills: 11 | Status: SHIPPED | OUTPATIENT
Start: 2024-06-19 | End: 2025-06-19

## 2024-06-19 RX ORDER — BUPIVACAINE HYDROCHLORIDE 2.5 MG/ML
20 INJECTION, SOLUTION EPIDURAL; INFILTRATION; INTRACAUDAL
Status: COMPLETED | OUTPATIENT
Start: 2024-06-19 | End: 2024-06-19

## 2024-06-19 RX ADMIN — BUPIVACAINE HYDROCHLORIDE 50 MG: 2.5 INJECTION, SOLUTION EPIDURAL; INFILTRATION; INTRACAUDAL at 02:06

## 2024-06-19 RX ADMIN — MEDROXYPROGESTERONE ACETATE 150 MG: 150 INJECTION, SUSPENSION INTRAMUSCULAR at 02:06

## 2024-06-19 NOTE — ASSESSMENT & PLAN NOTE
CT A/P with 0.5 mm umbilical hernia noted.  Patient interested in seeing general surgery to go over options considering she has pain at umbilicus    - Gen surg referral placed  
Received Depo injection.  
Trigger point injections with bupivicaine administered at umbilical and RLQ port sites.    - Gabapentin 100 mg qhs - patient amenable to trying lower dose  - Continue lidocaine patches  - RTC in 4 weeks for next trigger point injections    
amitriptyline (ELAVIL) 10 MG tablet     Last Written Prescription Date: 9/29/2016  Last Fill Quantity: 60, # refills: 3  Last Office Visit with [facility], [facility] or [facility] prescribing provider: 5/23/2017        BP Readings from Last 3 Encounters:   05/23/17 119/64   03/08/17 108/62   01/04/17 100/66     Last PHQ-9 score on record=   PHQ-9 SCORE 1/4/2017   Total Score -   Total Score 3                 
Attending

## 2024-06-19 NOTE — PROGRESS NOTES
"[facility] GYNECOLOGY CLINIC NOTE     Ellis Ramos is a 28 y.o.  presenting to GYN clinic for RLQ trigger point injection.     From prior documentation:  Patient is s/p LEEP, Hysteroscopy D&C, Diagnostic laparoscopy, Chromotubation, and Cystoscopy with hydrodistension for Cervical dysplasia, Abnormal uterine bleeding, Pelvic pain, Uterine polyps, Interstitial cystitis, and desired fertility on 2024.     Patient with nerve entrapment at RLQ trocar site. Today patient states last trigger point injection lasted for about 4 days. Since it wore off she has used lidocaine patches which she says help somewhat. She also tried gabapentin but states it "made her crazy". Reports she has returned to work but is required to lift items that weigh around 60-70 lbs which makes her pain worse.     Main concern today is umbilical pain. Reports she has always had a sensitive belly button and that this pain has been occurring since prior to surgery. Underwent CT A/P for this pain after last clinic visit which reveals approximately 0.5 cm umbilical hernia.     Patient denies abnormal vaginal bleeding, abnormal discharge, pelvic pain, abdominal pain, vulvar rash or skin changes, dysuria, dyspareunia. Sexually active with one male partner.     Gynecology  OB History          0    Para   0    Term   0       0    AB   0    Living   0         SAB   0    IAB   0    Ectopic   0    Multiple   0    Live Births   0                Past Medical History:   Diagnosis Date    Abnormal Pap smear of cervix       Past Surgical History:   Procedure Laterality Date    CHROMOTUBATION OF FALLOPIAN TUBE Bilateral 2024    Procedure: CHROMOTUBATION, OVIDUCT;  Surgeon: Yola Hair MD;  Location: [facility] OR;  Service: OB/GYN;  Laterality: Bilateral;    CONIZATION OF CERVIX USING LOOP ELECTROSURGICAL EXCISION PROCEDURE (LEEP) N/A 2024    Procedure: LEEP CONIZATION, CERVIX;  Surgeon: Yola Hair MD;  Location: [facility] OR;  " Service: OB/GYN;  Laterality: N/A;    CYST REMOVAL      On right foot    CYSTOSCOPY N/A 4/11/2024    Procedure: CYSTOSCOPY;  Surgeon: Yola Hair MD;  Location: [facility] OR;  Service: OB/GYN;  Laterality: N/A;    DIAGNOSTIC LAPAROSCOPY N/A 4/11/2024    Procedure: LAPAROSCOPY, DIAGNOSTIC;  Surgeon: Yola Hair MD;  Location: [facility] OR;  Service: OB/GYN;  Laterality: N/A;  2 cameras, 2 light cords  Top & bottom @same time    HYSTEROSCOPY WITH DILATION AND CURETTAGE OF UTERUS N/A 4/11/2024    Procedure: HYSTEROSCOPY, WITH DILATION AND CURETTAGE OF UTERUS;  Surgeon: Yola Hair MD;  Location: [facility] OR;  Service: OB/GYN;  Laterality: N/A;      Current Outpatient Medications   Medication Instructions    acetaminophen (TYLENOL) 1,000 mg, Oral, Every 6 hours PRN    gabapentin (NEURONTIN) 300 mg, Oral, Nightly    hyoscyamine 0.125 mg, Sublingual, Every 4 hours PRN    ibuprofen (ADVIL,MOTRIN) 600 mg, Oral, 3 times daily    LIDOcaine (LIDODERM) 5 % 1 patch, Transdermal, Daily, Remove & Discard patch within 12 hours or as directed by MD. Apply on abdomen near but not directly touching incision.    ondansetron (ZOFRAN-ODT) 4 mg, Oral, Every 6 hours PRN    oxyCODONE (ROXICODONE) 5 mg, Oral, Every 4 hours PRN    SENNA 8.6 mg tablet 1 tablet, Oral, Daily PRN     Social History     Tobacco Use    Smoking status: Never     Passive exposure: Never    Smokeless tobacco: Never   Substance Use Topics    Alcohol use: Not Currently     Comment: Occasionally    Drug use: Never       Review of Systems  Pertinent items noted in HPI.    Objective:     There were no vitals filed for this visit.  There is no height or weight on file to calculate BMI.    Physical Exam:   General: Alert and oriented, in no acute distress  Lungs: Breathing comfortably on room air, no conversational dyspnea  Heart: Regular rate, extremities well perfused  Abdomen: Soft, non-distended, tender to palpation at umbilicus, non-tender at RLQ port  site  Extremities: Atraumatic, non-edematous, no cords or calf tenderness, no significant calf/ankle edema  External genitalia: deferred  Bimanual Exam: deferred  Speculum Exam: deferred  Note:  Female nurse chaperone present for entirety of exam.    Relevant Labs:   Lab Results   Component Value Date    WBC 5.25 02/25/2024    HGB 15.1 02/25/2024    HCT 45.2 02/25/2024    MCV 95.0 (H) 02/25/2024     02/25/2024       Imaging:  CT Abdomen Pelvis W Wo Contrast 05/31/2024    Narrative  EXAMINATION:  CT ABDOMEN PELVIS W WO CONTRAST    CLINICAL HISTORY:  Abdominal pain, post-op; Periumbilical pain    TECHNIQUE:  Helically acquired images with axial, sagittal and coronal reformations were obtained from the lung bases to the pubic symphysis prior to and after the IV administration of contrast.    Automated tube current modulation, weight-based exposure dosing, and/or iterative reconstruction technique utilized to reach lowest reasonably achievable exposure rate.    DLP: 907 mGy*cm    COMPARISON:  CT abdomen pelvis 05/08/2020, MRI pelvis 03/13/2024    FINDINGS:  HEART: Normal in size. No pericardial effusion.    LUNG BASES: Well aerated.    LIVER: Normal attenuation. No appreciable focal hepatic lesion.    BILIARY: No calcified gallstones.    PANCREAS: No inflammatory change.    SPLEEN: Normal in size    ADRENALS: No mass.    KIDNEYS/URETERS: No renal or ureteral calculus. No hydronephrosis.  The kidneys enhance symmetrically.    GI TRACT/MESENTERY:  No evidence of bowel obstruction or inflammation. The appendix is normal.    PERITONEUM: No free fluid.No free air.    LYMPH NODES: No enlarged lymph nodes by size criteria.    VASCULATURE: No significant atherosclerosis or aneurysm.    BLADDER: Normal appearance given degree of distention.    REPRODUCTIVE ORGANS: Probable 9 mm subserosal fibroid at the fundus on the left (8, 135).    ABDOMINAL WALL: Tiny fat containing umbilical hernia.    BONES: Pectus  excavatum.    Impression  1. No significant abnormality by CT evaluation      Electronically signed by: Adwoa Sim  Date:    2024  Time:    15:15     Procedures: trigger point injections     - Consent obtained. Time out performed.  - RLQ and umbilical port sites cleansed with alcohol x2  - 15 mL total of 0.25% bupivicaine injected into port sites  - Trigger point injection performed via one injection. Dermis was injected then dry needling performed to find areas of pain and those areas were injected.  Once no further areas identified, remaining lidocaine/triamcinolone injected into general subQ tissue for additional relief.  - Patient tolerated without complication.    Assessment:     28 y.o.  here for trigger injections for nerve entrapment at umbilical and RLQ port sites s/p surgery in 2024.    1. Nerve entrapment  BUPivacaine (PF) 0.25% (2.5 mg/ml) injection 50 mg    gabapentin (NEURONTIN) 100 MG capsule      2. Umbilical hernia without obstruction and without gangrene  Ambulatory referral/consult to General Surgery      3. Abnormal uterine bleeding (AUB)  medroxyPROGESTERone (DEPO-PROVERA) injection 150 mg          Plan:         Problem List Items Addressed This Visit          Neuro    Nerve entrapment - Primary     Trigger point injections with bupivicaine administered at umbilical and RLQ port sites.    - Gabapentin 100 mg qhs - patient amenable to trying lower dose  - Continue lidocaine patches  - RTC in 4 weeks for next trigger point injections           Relevant Medications    BUPivacaine (PF) 0.25% (2.5 mg/ml) injection 50 mg (Completed)    gabapentin (NEURONTIN) 100 MG capsule       Renal/    Abnormal uterine bleeding (AUB)     Received Depo injection.         Relevant Medications    medroxyPROGESTERone (DEPO-PROVERA) injection 150 mg (Completed)       GI    Umbilical hernia without obstruction and without gangrene     CT A/P with 0.5 mm umbilical hernia noted.  Patient  interested in seeing general surgery to go over options considering she has pain at umbilicus    - Gen surg referral placed         Relevant Orders    Ambulatory referral/consult to General Surgery       Return to clinic 4 weeks for trigger point injections    Discussed patient and plan with Dr. Murrell.    Tawnya Leos MD  [facility] - PGY-2  1:08 PM 06/19/2024

## 2024-07-09 ENCOUNTER — OFFICE VISIT (OUTPATIENT)
Dept: SURGERY | Facility: CLINIC | Age: 29
End: 2024-07-09
Payer: COMMERCIAL

## 2024-07-09 VITALS
HEART RATE: 83 BPM | OXYGEN SATURATION: 100 % | TEMPERATURE: 98 F | RESPIRATION RATE: 18 BRPM | BODY MASS INDEX: 25.55 KG/M2 | WEIGHT: 159 LBS | SYSTOLIC BLOOD PRESSURE: 101 MMHG | HEIGHT: 66 IN | DIASTOLIC BLOOD PRESSURE: 67 MMHG

## 2024-07-09 DIAGNOSIS — K42.9 UMBILICAL HERNIA WITHOUT OBSTRUCTION AND WITHOUT GANGRENE: ICD-10-CM

## 2024-07-09 PROCEDURE — 99215 OFFICE O/P EST HI 40 MIN: CPT | Mod: PBBFAC

## 2024-07-09 RX ORDER — HEPARIN SODIUM 5000 [USP'U]/ML
5000 INJECTION, SOLUTION INTRAVENOUS; SUBCUTANEOUS
OUTPATIENT
Start: 2024-07-09 | End: 2024-07-09

## 2024-07-09 RX ORDER — SODIUM CHLORIDE 9 MG/ML
INJECTION, SOLUTION INTRAVENOUS CONTINUOUS
OUTPATIENT
Start: 2024-07-09

## 2024-07-09 RX ORDER — CLINDAMYCIN PHOSPHATE 900 MG/50ML
900 INJECTION, SOLUTION INTRAVENOUS
OUTPATIENT
Start: 2024-07-09

## 2024-07-09 NOTE — H&P (VIEW-ONLY)
[facility] GENERAL SURGERY   Clinic Note       CC: Umbilical hearnia      HPI: Ellis Ramos is a 28 y.o. female s/p LEEP, Hysteroscopy D&C, Diagnostic laparoscopy, Chromotubation, and Cystoscopy with hydrodistension for Cervical dysplasia, Abnormal uterine bleeding, Pelvic pain, Uterine polyps, Interstitial cystitis, and desired fertility on 4/11/2024. Patient presents with 0.5 incisional/umbilical hernia of subcutaneous fat. No n/v/d but endorses abdominal pain. Has tried gabapentin and trigger point injection with no relief of sx. No other sugical history. Denies smoking tobacco, alcohol or drugs. BMI 25.6. Would like surgery.     PMH:  Past Medical History:   Diagnosis Date    Abnormal Pap smear of cervix          PSH:  Past Surgical History:   Procedure Laterality Date    CHROMOTUBATION OF FALLOPIAN TUBE Bilateral 4/11/2024    Procedure: CHROMOTUBATION, OVIDUCT;  Surgeon: Yola Hair MD;  Location: [facility];  Service: OB/GYN;  Laterality: Bilateral;    CONIZATION OF CERVIX USING LOOP ELECTROSURGICAL EXCISION PROCEDURE (LEEP) N/A 4/11/2024    Procedure: LEEP CONIZATION, CERVIX;  Surgeon: Yola Hair MD;  Location: [facility];  Service: OB/GYN;  Laterality: N/A;    CYST REMOVAL      On right foot    CYSTOSCOPY N/A 4/11/2024    Procedure: CYSTOSCOPY;  Surgeon: Yola Hair MD;  Location: [facility];  Service: OB/GYN;  Laterality: N/A;    DIAGNOSTIC LAPAROSCOPY N/A 4/11/2024    Procedure: LAPAROSCOPY, DIAGNOSTIC;  Surgeon: Yola Hair MD;  Location: [facility];  Service: OB/GYN;  Laterality: N/A;  2 cameras, 2 light cords  Top & bottom @same time    HYSTEROSCOPY WITH DILATION AND CURETTAGE OF UTERUS N/A 4/11/2024    Procedure: HYSTEROSCOPY, WITH DILATION AND CURETTAGE OF UTERUS;  Surgeon: Yola Hair MD;  Location: [facility];  Service: OB/GYN;  Laterality: N/A;         Medications:  Current Outpatient Medications on File Prior to Visit   Medication Sig Dispense Refill    acetaminophen (TYLENOL) 500 MG tablet  Take 2 tablets (1,000 mg total) by mouth every 6 (six) hours as needed for Pain. 60 tablet 0    gabapentin (NEURONTIN) 100 MG capsule Take 1 capsule (100 mg total) by mouth every evening. 30 capsule 11    LIDOcaine (LIDODERM) 5 % Place 1 patch onto the skin once daily. Remove & Discard patch within 12 hours or as directed by MD. Apply on abdomen near but not directly touching incision. 7 patch 0    SENNA 8.6 mg tablet Take 1 tablet by mouth daily as needed for Constipation. 14 tablet 0    hyoscyamine (LEVSIN) 0.125 mg Subl Place 1 tablet (0.125 mg total) under the tongue every 4 (four) hours as needed (bladder spasm). (Patient not taking: Reported on 4/22/2024) 15 tablet 0    ibuprofen (ADVIL,MOTRIN) 600 MG tablet Take 1 tablet (600 mg total) by mouth 3 (three) times daily. (Patient not taking: Reported on 6/19/2024) 30 tablet 0    ondansetron (ZOFRAN-ODT) 4 MG TbDL Take 1 tablet (4 mg total) by mouth every 6 (six) hours as needed (nausea). (Patient not taking: Reported on 5/24/2024) 20 tablet 0    oxyCODONE (ROXICODONE) 5 MG immediate release tablet Take 1 tablet (5 mg total) by mouth every 4 (four) hours as needed for Pain. (Patient not taking: Reported on 4/22/2024) 6 tablet 0     Current Facility-Administered Medications on File Prior to Visit   Medication Dose Route Frequency Provider Last Rate Last Admin    lactated ringers infusion   Intravenous Continuous Juwan Guerrero  mL/hr at 04/11/24 0627 New Bag at 04/11/24 0627    lactated ringers infusion   Intravenous Continuous Bette Butterfield MD   New Bag at 04/11/24 0937        Allergies:  Review of patient's allergies indicates:   Allergen Reactions    Amoxicillin Dermatitis     Other reaction(s): Rash         Social Hx:  Social History     Tobacco Use   Smoking Status Never    Passive exposure: Never   Smokeless Tobacco Never      Social History     Substance and Sexual Activity   Alcohol Use Not Currently    Comment: Occasionally         Relevant  Family Hx:  Family History   Problem Relation Name Age of Onset    Ovarian cancer Paternal Grandmother      Ovarian cancer Maternal Grandmother      Diabetes Maternal Aunt            Physical Exam:   Vitals:    07/09/24 0827   BP: 101/67   Pulse: 83   Resp: 18   Temp: 97.9 °F (36.6 °C)      Gen: NAD, AAOx3   Eye: EOMI   CV: RR  Pulm: NWOB on RA  Abd: soft, non-tender, non-distended  Ext:  Move all 4 extremities.         Interval Imaging:    CT Abdomen Pelvis W Wo Contrast  Order: [id number]  Status: Final result       Visible to patient: Yes (seen)       Next appt: 07/24/2024 at 09:45 AM in Gynecology ([facility] GYN)       Dx: Periumbilical abdominal pain    0 Result Notes       1 Follow-up Encounter  Details    Reading Physician Reading Date Result Priority   Adwoa Sim MD  [phone] 5/31/2024 Routine     Narrative & Impression  EXAMINATION:  CT ABDOMEN PELVIS W WO CONTRAST     CLINICAL HISTORY:  Abdominal pain, post-op; Periumbilical pain     TECHNIQUE:  Helically acquired images with axial, sagittal and coronal reformations were obtained from the lung bases to the pubic symphysis prior to and after the IV administration of contrast.     Automated tube current modulation, weight-based exposure dosing, and/or iterative reconstruction technique utilized to reach lowest reasonably achievable exposure rate.     DLP: 907 mGy*cm     COMPARISON:  CT abdomen pelvis 05/08/2020, MRI pelvis 03/13/2024     FINDINGS:  HEART: Normal in size. No pericardial effusion.     LUNG BASES: Well aerated.     LIVER: Normal attenuation. No appreciable focal hepatic lesion.     BILIARY: No calcified gallstones.     PANCREAS: No inflammatory change.     SPLEEN: Normal in size     ADRENALS: No mass.     KIDNEYS/URETERS: No renal or ureteral calculus. No hydronephrosis.  The kidneys enhance symmetrically.     GI TRACT/MESENTERY:  No evidence of bowel obstruction or inflammation. The appendix is normal.     PERITONEUM: No  free fluid.No free air.     LYMPH NODES: No enlarged lymph nodes by size criteria.     VASCULATURE: No significant atherosclerosis or aneurysm.     BLADDER: Normal appearance given degree of distention.     REPRODUCTIVE ORGANS: Probable 9 mm subserosal fibroid at the fundus on the left (8, 135).     ABDOMINAL WALL: Tiny fat containing umbilical hernia.     BONES: Pectus excavatum.     Impression:     1. No significant abnormality by CT evaluation        Electronically signed by:Adwoa Sim  Date:                                            05/31/2024  Time:                                           15:15           Exam Ended: 05/31/24 14:51 CDT                      A/P: Ellis Ramos is a 28 y.o. female with no PMHx with 0.5 cm umbilical/incisional hernia of subcutaneous fat after gynecologic surgery 4/11. Pt endorses pain at the site that is refractory to pain meds.  Given this, patient is amenable to surgical intervention at this time.  Extensive conversation with patient regarding risks and benefits of procedure including recurrence of a hernia in further exacerbation for pain.  Patient is not obstructed at this point as she continues to have regular bowel function flatus and tolerating p.o. intake without any nausea or vomiting.  Patient understands the risks of the procedure and the possibility of recurrence and would still like to proceed.  Patient currently is going to send over [facility] paperwork and thus would like the surgery to be later this month.  Patient is scheduled for surgical intervention 7/26.  If patient gets all of her [facility] paperwork approved before then, patient will call and try to get the surgery scheduled for earlier.       Aaron Cordon  [facility] General Surgery, MS-3      Patient seen with medical student above.  At its made as necessary.  Agree with assessment and plan.    Ishan Cast MD  [facility] General Surgery

## 2024-07-09 NOTE — PROGRESS NOTES
[facility] GENERAL SURGERY   Clinic Note       CC: Umbilical hearnia      HPI: Ellis Ramos is a 28 y.o. female s/p LEEP, Hysteroscopy D&C, Diagnostic laparoscopy, Chromotubation, and Cystoscopy with hydrodistension for Cervical dysplasia, Abnormal uterine bleeding, Pelvic pain, Uterine polyps, Interstitial cystitis, and desired fertility on 4/11/2024. Patient presents with 0.5 incisional/umbilical hernia of subcutaneous fat. No n/v/d but endorses abdominal pain. Has tried gabapentin and trigger point injection with no relief of sx. No other sugical history. Denies smoking tobacco, alcohol or drugs. BMI 25.6. Would like surgery.     PMH:  Past Medical History:   Diagnosis Date    Abnormal Pap smear of cervix          PSH:  Past Surgical History:   Procedure Laterality Date    CHROMOTUBATION OF FALLOPIAN TUBE Bilateral 4/11/2024    Procedure: CHROMOTUBATION, OVIDUCT;  Surgeon: Yola Hair MD;  Location: [facility];  Service: OB/GYN;  Laterality: Bilateral;    CONIZATION OF CERVIX USING LOOP ELECTROSURGICAL EXCISION PROCEDURE (LEEP) N/A 4/11/2024    Procedure: LEEP CONIZATION, CERVIX;  Surgeon: Yola Hair MD;  Location: [facility];  Service: OB/GYN;  Laterality: N/A;    CYST REMOVAL      On right foot    CYSTOSCOPY N/A 4/11/2024    Procedure: CYSTOSCOPY;  Surgeon: Yola Hair MD;  Location: [facility];  Service: OB/GYN;  Laterality: N/A;    DIAGNOSTIC LAPAROSCOPY N/A 4/11/2024    Procedure: LAPAROSCOPY, DIAGNOSTIC;  Surgeon: Yola Hair MD;  Location: [facility];  Service: OB/GYN;  Laterality: N/A;  2 cameras, 2 light cords  Top & bottom @same time    HYSTEROSCOPY WITH DILATION AND CURETTAGE OF UTERUS N/A 4/11/2024    Procedure: HYSTEROSCOPY, WITH DILATION AND CURETTAGE OF UTERUS;  Surgeon: Yola Hair MD;  Location: [facility];  Service: OB/GYN;  Laterality: N/A;         Medications:  Current Outpatient Medications on File Prior to Visit   Medication Sig Dispense Refill    acetaminophen (TYLENOL) 500 MG tablet  Take 2 tablets (1,000 mg total) by mouth every 6 (six) hours as needed for Pain. 60 tablet 0    gabapentin (NEURONTIN) 100 MG capsule Take 1 capsule (100 mg total) by mouth every evening. 30 capsule 11    hyoscyamine (LEVSIN) 0.125 mg Subl Place 1 tablet (0.125 mg total) under the tongue every 4 (four) hours as needed (bladder spasm). (Patient not taking: Reported on 4/22/2024) 15 tablet 0    ibuprofen (ADVIL,MOTRIN) 600 MG tablet Take 1 tablet (600 mg total) by mouth 3 (three) times daily. (Patient not taking: Reported on 6/19/2024) 30 tablet 0    LIDOcaine (LIDODERM) 5 % Place 1 patch onto the skin once daily. Remove & Discard patch within 12 hours or as directed by MD. Apply on abdomen near but not directly touching incision. 7 patch 0    ondansetron (ZOFRAN-ODT) 4 MG TbDL Take 1 tablet (4 mg total) by mouth every 6 (six) hours as needed (nausea). (Patient not taking: Reported on 5/24/2024) 20 tablet 0    oxyCODONE (ROXICODONE) 5 MG immediate release tablet Take 1 tablet (5 mg total) by mouth every 4 (four) hours as needed for Pain. (Patient not taking: Reported on 4/22/2024) 6 tablet 0    SENNA 8.6 mg tablet Take 1 tablet by mouth daily as needed for Constipation. (Patient not taking: Reported on 4/22/2024) 14 tablet 0     Current Facility-Administered Medications on File Prior to Visit   Medication Dose Route Frequency Provider Last Rate Last Admin    lactated ringers infusion   Intravenous Continuous Juwan Guerrero  mL/hr at 04/11/24 0627 New Bag at 04/11/24 0627    lactated ringers infusion   Intravenous Continuous Bette Butterfield MD   New Bag at 04/11/24 0937        Allergies:  Review of patient's allergies indicates:   Allergen Reactions    Amoxicillin Dermatitis     Other reaction(s): Rash         Social Hx:  Social History     Tobacco Use   Smoking Status Never    Passive exposure: Never   Smokeless Tobacco Never      Social History     Substance and Sexual Activity   Alcohol Use Not Currently     Comment: Occasionally         Relevant Family Hx:  Family History   Problem Relation Name Age of Onset    Ovarian cancer Paternal Grandmother      Ovarian cancer Maternal Grandmother      Diabetes Maternal Aunt            Physical Exam:   There were no vitals filed for this visit.   Gen: NAD, AAOx3   Eye: EOMI   CV: RR  Pulm: NWOB on RA  Abd: soft, non-tender, non-distended  Ext:  Move all 4 extremities.         Interval Imaging:    CT Abdomen Pelvis W Wo Contrast  Order: [id number]  Status: Final result       Visible to patient: Yes (seen)       Next appt: 07/24/2024 at 09:45 AM in Gynecology ([facility])       Dx: Periumbilical abdominal pain    0 Result Notes       1 Follow-up Encounter  Details    Reading Physician Reading Date Result Priority   Adwoa Sim MD  [phone] 5/31/2024 Routine     Narrative & Impression  EXAMINATION:  CT ABDOMEN PELVIS W WO CONTRAST     CLINICAL HISTORY:  Abdominal pain, post-op; Periumbilical pain     TECHNIQUE:  Helically acquired images with axial, sagittal and coronal reformations were obtained from the lung bases to the pubic symphysis prior to and after the IV administration of contrast.     Automated tube current modulation, weight-based exposure dosing, and/or iterative reconstruction technique utilized to reach lowest reasonably achievable exposure rate.     DLP: 907 mGy*cm     COMPARISON:  CT abdomen pelvis 05/08/2020, MRI pelvis 03/13/2024     FINDINGS:  HEART: Normal in size. No pericardial effusion.     LUNG BASES: Well aerated.     LIVER: Normal attenuation. No appreciable focal hepatic lesion.     BILIARY: No calcified gallstones.     PANCREAS: No inflammatory change.     SPLEEN: Normal in size     ADRENALS: No mass.     KIDNEYS/URETERS: No renal or ureteral calculus. No hydronephrosis.  The kidneys enhance symmetrically.     GI TRACT/MESENTERY:  No evidence of bowel obstruction or inflammation. The appendix is normal.     PERITONEUM: No free  fluid.No free air.     LYMPH NODES: No enlarged lymph nodes by size criteria.     VASCULATURE: No significant atherosclerosis or aneurysm.     BLADDER: Normal appearance given degree of distention.     REPRODUCTIVE ORGANS: Probable 9 mm subserosal fibroid at the fundus on the left (8, 135).     ABDOMINAL WALL: Tiny fat containing umbilical hernia.     BONES: Pectus excavatum.     Impression:     1. No significant abnormality by CT evaluation        Electronically signed by:Adwoa Sim  Date:                                            05/31/2024  Time:                                           15:15           Exam Ended: 05/31/24 14:51 CDT                      A/P: Ellis Ramos is a 28 y.o. female with no PMHx with 0.5 cm umbilical/incisional hernia of subcutaneous fat after gynecologic surgery 4/11. Pt endorses pain at the site that is refractory to pain meds.  Given this, patient is amenable to surgical intervention at this time.  Extensive conversation with patient regarding risks and benefits of procedure including recurrence of a hernia in further exacerbation for pain.  Patient is not obstructed at this point as she continues to have regular bowel function flatus and tolerating p.o. intake without any nausea or vomiting.  Patient understands the risks of the procedure and the possibility of recurrence and would still like to proceed.  Patient currently is going to send over [facility] paperwork and thus would like the surgery to be later this month.  Patient is scheduled for surgical intervention 7/26.  If patient gets all of her [facility] paperwork approved before then, patient will call and try to get the surgery scheduled for earlier.       Aaron Cordon  [facility] General Surgery, MS-3      Patient seen with medical student above.  At its made as necessary.  Agree with assessment and plan.    Ishan Cast MD  [facility] General Surgery

## 2024-07-09 NOTE — PROGRESS NOTES
Patient seen in gen surg clinic by Dr Cast.  Surgery scheduled for 7/26/24.  Information regarding pre op given and explained along with SAVANA wipes and instructions.  Patient will follow up 2 weeks post op.

## 2024-07-09 NOTE — PROGRESS NOTES
[facility] GENERAL SURGERY   Clinic Note       CC: Umbilical hearnia      HPI: Ellis Ramos is a 28 y.o. female s/p LEEP, Hysteroscopy D&C, Diagnostic laparoscopy, Chromotubation, and Cystoscopy with hydrodistension for Cervical dysplasia, Abnormal uterine bleeding, Pelvic pain, Uterine polyps, Interstitial cystitis, and desired fertility on 4/11/2024. Patient presents with 0.5 incisional/umbilical hernia of subcutaneous fat. No n/v/d but endorses abdominal pain. Has tried gabapentin and trigger point injection with no relief of sx. No other sugical history. Denies smoking tobacco, alcohol or drugs. BMI 25.6. Would like surgery.     PMH:  Past Medical History:   Diagnosis Date    Abnormal Pap smear of cervix          PSH:  Past Surgical History:   Procedure Laterality Date    CHROMOTUBATION OF FALLOPIAN TUBE Bilateral 4/11/2024    Procedure: CHROMOTUBATION, OVIDUCT;  Surgeon: Yola Hair MD;  Location: [facility];  Service: OB/GYN;  Laterality: Bilateral;    CONIZATION OF CERVIX USING LOOP ELECTROSURGICAL EXCISION PROCEDURE (LEEP) N/A 4/11/2024    Procedure: LEEP CONIZATION, CERVIX;  Surgeon: Yola Hair MD;  Location: [facility];  Service: OB/GYN;  Laterality: N/A;    CYST REMOVAL      On right foot    CYSTOSCOPY N/A 4/11/2024    Procedure: CYSTOSCOPY;  Surgeon: Yola Hair MD;  Location: [facility];  Service: OB/GYN;  Laterality: N/A;    DIAGNOSTIC LAPAROSCOPY N/A 4/11/2024    Procedure: LAPAROSCOPY, DIAGNOSTIC;  Surgeon: Yola Hair MD;  Location: [facility];  Service: OB/GYN;  Laterality: N/A;  2 cameras, 2 light cords  Top & bottom @same time    HYSTEROSCOPY WITH DILATION AND CURETTAGE OF UTERUS N/A 4/11/2024    Procedure: HYSTEROSCOPY, WITH DILATION AND CURETTAGE OF UTERUS;  Surgeon: Yola Hair MD;  Location: [facility];  Service: OB/GYN;  Laterality: N/A;         Medications:  Current Outpatient Medications on File Prior to Visit   Medication Sig Dispense Refill    acetaminophen (TYLENOL) 500 MG tablet  Take 2 tablets (1,000 mg total) by mouth every 6 (six) hours as needed for Pain. 60 tablet 0    gabapentin (NEURONTIN) 100 MG capsule Take 1 capsule (100 mg total) by mouth every evening. 30 capsule 11    LIDOcaine (LIDODERM) 5 % Place 1 patch onto the skin once daily. Remove & Discard patch within 12 hours or as directed by MD. Apply on abdomen near but not directly touching incision. 7 patch 0    SENNA 8.6 mg tablet Take 1 tablet by mouth daily as needed for Constipation. 14 tablet 0    hyoscyamine (LEVSIN) 0.125 mg Subl Place 1 tablet (0.125 mg total) under the tongue every 4 (four) hours as needed (bladder spasm). (Patient not taking: Reported on 4/22/2024) 15 tablet 0    ibuprofen (ADVIL,MOTRIN) 600 MG tablet Take 1 tablet (600 mg total) by mouth 3 (three) times daily. (Patient not taking: Reported on 6/19/2024) 30 tablet 0    ondansetron (ZOFRAN-ODT) 4 MG TbDL Take 1 tablet (4 mg total) by mouth every 6 (six) hours as needed (nausea). (Patient not taking: Reported on 5/24/2024) 20 tablet 0    oxyCODONE (ROXICODONE) 5 MG immediate release tablet Take 1 tablet (5 mg total) by mouth every 4 (four) hours as needed for Pain. (Patient not taking: Reported on 4/22/2024) 6 tablet 0     Current Facility-Administered Medications on File Prior to Visit   Medication Dose Route Frequency Provider Last Rate Last Admin    lactated ringers infusion   Intravenous Continuous Juwan Guerrero  mL/hr at 04/11/24 0627 New Bag at 04/11/24 0627    lactated ringers infusion   Intravenous Continuous Bette Butterfield MD   New Bag at 04/11/24 0937        Allergies:  Review of patient's allergies indicates:   Allergen Reactions    Amoxicillin Dermatitis     Other reaction(s): Rash         Social Hx:  Social History     Tobacco Use   Smoking Status Never    Passive exposure: Never   Smokeless Tobacco Never      Social History     Substance and Sexual Activity   Alcohol Use Not Currently    Comment: Occasionally         Relevant  Family Hx:  Family History   Problem Relation Name Age of Onset    Ovarian cancer Paternal Grandmother      Ovarian cancer Maternal Grandmother      Diabetes Maternal Aunt            Physical Exam:   Vitals:    07/09/24 0827   BP: 101/67   Pulse: 83   Resp: 18   Temp: 97.9 °F (36.6 °C)      Gen: NAD, AAOx3   Eye: EOMI   CV: RR  Pulm: NWOB on RA  Abd: soft, non-tender, non-distended  Ext:  Move all 4 extremities.         Interval Imaging:    CT Abdomen Pelvis W Wo Contrast  Order: [id number]  Status: Final result       Visible to patient: Yes (seen)       Next appt: 07/24/2024 at 09:45 AM in Gynecology ([facility])       Dx: Periumbilical abdominal pain    0 Result Notes       1 Follow-up Encounter  Details    Reading Physician Reading Date Result Priority   Adwoa Sim MD  [phone] 5/31/2024 Routine     Narrative & Impression  EXAMINATION:  CT ABDOMEN PELVIS W WO CONTRAST     CLINICAL HISTORY:  Abdominal pain, post-op; Periumbilical pain     TECHNIQUE:  Helically acquired images with axial, sagittal and coronal reformations were obtained from the lung bases to the pubic symphysis prior to and after the IV administration of contrast.     Automated tube current modulation, weight-based exposure dosing, and/or iterative reconstruction technique utilized to reach lowest reasonably achievable exposure rate.     DLP: 907 mGy*cm     COMPARISON:  CT abdomen pelvis 05/08/2020, MRI pelvis 03/13/2024     FINDINGS:  HEART: Normal in size. No pericardial effusion.     LUNG BASES: Well aerated.     LIVER: Normal attenuation. No appreciable focal hepatic lesion.     BILIARY: No calcified gallstones.     PANCREAS: No inflammatory change.     SPLEEN: Normal in size     ADRENALS: No mass.     KIDNEYS/URETERS: No renal or ureteral calculus. No hydronephrosis.  The kidneys enhance symmetrically.     GI TRACT/MESENTERY:  No evidence of bowel obstruction or inflammation. The appendix is normal.     PERITONEUM: No  free fluid.No free air.     LYMPH NODES: No enlarged lymph nodes by size criteria.     VASCULATURE: No significant atherosclerosis or aneurysm.     BLADDER: Normal appearance given degree of distention.     REPRODUCTIVE ORGANS: Probable 9 mm subserosal fibroid at the fundus on the left (8, 135).     ABDOMINAL WALL: Tiny fat containing umbilical hernia.     BONES: Pectus excavatum.     Impression:     1. No significant abnormality by CT evaluation        Electronically signed by:Adwoa Sim  Date:                                            05/31/2024  Time:                                           15:15           Exam Ended: 05/31/24 14:51 CDT                      A/P: Ellis Ramos is a 28 y.o. female with no PMHx with 0.5 cm umbilical/incisional hernia of subcutaneous fat after gynecologic surgery 4/11. Pt endorses pain at the site that is refractory to pain meds.  Given this, patient is amenable to surgical intervention at this time.  Extensive conversation with patient regarding risks and benefits of procedure including recurrence of a hernia in further exacerbation for pain.  Patient is not obstructed at this point as she continues to have regular bowel function flatus and tolerating p.o. intake without any nausea or vomiting.  Patient understands the risks of the procedure and the possibility of recurrence and would still like to proceed.  Patient currently is going to send over [facility] paperwork and thus would like the surgery to be later this month.  Patient is scheduled for surgical intervention 7/26.  If patient gets all of her [facility] paperwork approved before then, patient will call and try to get the surgery scheduled for earlier.       Aaron Cordon  [facility] General Surgery, MS-3      Patient seen with medical student above.  At its made as necessary.  Agree with assessment and plan.    Ishan Cast MD  [facility] General Surgery

## 2024-07-09 NOTE — LETTER
July 9, 2024    Ellis Ramos  [address] [address]  [facility] [address]         [facility]  [address] [address]  Phone: [phone] July 9, 2024     Patient: Ellis Ramos   YOB: 1995   Date of Visit: 7/9/2024       To Whom It May Concern:     Ellis Ramos visit the [facility] surgery Clinic on 07/09/2024 for evaluation for surgery.  Patient was scheduled for surgery on 07/26 at this time.  Patient will not be able to left above 10 lb for at least 2 weeks after her surgery.    If you have any questions or concerns, please don't hesitate to call.    Sincerely,        Ishan Cast MD

## 2024-07-12 ENCOUNTER — ANESTHESIA EVENT (OUTPATIENT)
Dept: SURGERY | Facility: HOSPITAL | Age: 29
End: 2024-07-12
Payer: COMMERCIAL

## 2024-07-25 ENCOUNTER — CLINICAL SUPPORT (OUTPATIENT)
Dept: GYNECOLOGY | Facility: CLINIC | Age: 29
End: 2024-07-25
Payer: COMMERCIAL

## 2024-07-25 ENCOUNTER — PATIENT MESSAGE (OUTPATIENT)
Dept: ADMINISTRATIVE | Facility: OTHER | Age: 29
End: 2024-07-25
Payer: COMMERCIAL

## 2024-07-25 ENCOUNTER — PATIENT MESSAGE (OUTPATIENT)
Dept: SURGERY | Facility: HOSPITAL | Age: 29
End: 2024-07-25
Payer: COMMERCIAL

## 2024-07-25 DIAGNOSIS — Z23 NEED FOR HPV VACCINATION: Primary | ICD-10-CM

## 2024-07-25 PROCEDURE — 99211 OFF/OP EST MAY X REQ PHY/QHP: CPT | Mod: PBBFAC,25

## 2024-07-25 PROCEDURE — 90651 9VHPV VACCINE 2/3 DOSE IM: CPT | Mod: PBBFAC

## 2024-07-25 PROCEDURE — 90471 IMMUNIZATION ADMIN: CPT | Mod: PBBFAC

## 2024-07-25 RX ADMIN — HUMAN PAPILLOMAVIRUS 9-VALENT VACCINE, RECOMBINANT 0.5 ML: 30; 40; 60; 40; 20; 20; 20; 20; 20 INJECTION, SUSPENSION INTRAMUSCULAR at 10:07

## 2024-07-26 ENCOUNTER — HOSPITAL ENCOUNTER (OUTPATIENT)
Facility: HOSPITAL | Age: 29
Discharge: HOME | End: 2024-07-26
Attending: SURGERY | Admitting: SURGERY
Payer: COMMERCIAL

## 2024-07-26 ENCOUNTER — ANESTHESIA (OUTPATIENT)
Dept: SURGERY | Facility: HOSPITAL | Age: 29
End: 2024-07-26
Payer: COMMERCIAL

## 2024-07-26 DIAGNOSIS — K42.9 UMBILICAL HERNIA WITHOUT OBSTRUCTION AND WITHOUT GANGRENE: ICD-10-CM

## 2024-07-26 LAB
B-HCG UR QL: NEGATIVE
CTP QC/QA: YES

## 2024-07-26 PROCEDURE — 25000003 PHARM REV CODE 250: Performed by: NURSE ANESTHETIST, CERTIFIED REGISTERED

## 2024-07-26 PROCEDURE — 37000009 HC ANESTHESIA EA ADD 15 MINS: Performed by: SURGERY

## 2024-07-26 PROCEDURE — 71000033 HC RECOVERY, INTIAL HOUR: Performed by: SURGERY

## 2024-07-26 PROCEDURE — 63600175 PHARM REV CODE 636 W HCPCS: Performed by: ANESTHESIOLOGY

## 2024-07-26 PROCEDURE — 36000706: Performed by: SURGERY

## 2024-07-26 PROCEDURE — 63600175 PHARM REV CODE 636 W HCPCS: Performed by: SPECIALIST

## 2024-07-26 PROCEDURE — 63600175 PHARM REV CODE 636 W HCPCS: Mod: JZ,JG | Performed by: SURGERY

## 2024-07-26 PROCEDURE — 71000016 HC POSTOP RECOV ADDL HR: Performed by: SURGERY

## 2024-07-26 PROCEDURE — 63600175 PHARM REV CODE 636 W HCPCS

## 2024-07-26 PROCEDURE — 25000003 PHARM REV CODE 250: Performed by: SPECIALIST

## 2024-07-26 PROCEDURE — 81025 URINE PREGNANCY TEST: CPT | Performed by: NURSE PRACTITIONER

## 2024-07-26 PROCEDURE — D9220A PRA ANESTHESIA: Mod: CRNA,,, | Performed by: NURSE ANESTHETIST, CERTIFIED REGISTERED

## 2024-07-26 PROCEDURE — 63600175 PHARM REV CODE 636 W HCPCS: Performed by: NURSE ANESTHETIST, CERTIFIED REGISTERED

## 2024-07-26 PROCEDURE — 71000015 HC POSTOP RECOV 1ST HR: Performed by: SURGERY

## 2024-07-26 PROCEDURE — 36000707: Performed by: SURGERY

## 2024-07-26 PROCEDURE — D9220A PRA ANESTHESIA: Mod: ANES,,, | Performed by: SPECIALIST

## 2024-07-26 PROCEDURE — 63600175 PHARM REV CODE 636 W HCPCS: Mod: JZ,JG

## 2024-07-26 PROCEDURE — 37000008 HC ANESTHESIA 1ST 15 MINUTES: Performed by: SURGERY

## 2024-07-26 RX ORDER — SUCCINYLCHOLINE CHLORIDE 20 MG/ML
INJECTION INTRAMUSCULAR; INTRAVENOUS
Status: DISCONTINUED | OUTPATIENT
Start: 2024-07-26 | End: 2024-07-26

## 2024-07-26 RX ORDER — HYDROMORPHONE HYDROCHLORIDE 1 MG/ML
0.5 INJECTION, SOLUTION INTRAMUSCULAR; INTRAVENOUS; SUBCUTANEOUS EVERY 5 MIN PRN
Status: DISCONTINUED | OUTPATIENT
Start: 2024-07-26 | End: 2024-07-26 | Stop reason: HOSPADM

## 2024-07-26 RX ORDER — IBUPROFEN 800 MG/1
800 TABLET ORAL 3 TIMES DAILY
Qty: 21 TABLET | Refills: 0 | Status: SHIPPED | OUTPATIENT
Start: 2024-07-26

## 2024-07-26 RX ORDER — BUPIVACAINE HYDROCHLORIDE 2.5 MG/ML
INJECTION, SOLUTION EPIDURAL; INFILTRATION; INTRACAUDAL
Status: DISCONTINUED | OUTPATIENT
Start: 2024-07-26 | End: 2024-07-26 | Stop reason: HOSPADM

## 2024-07-26 RX ORDER — MIDAZOLAM HYDROCHLORIDE 2 MG/2ML
2 INJECTION, SOLUTION INTRAMUSCULAR; INTRAVENOUS ONCE AS NEEDED
Status: COMPLETED | OUTPATIENT
Start: 2024-07-26 | End: 2024-07-26

## 2024-07-26 RX ORDER — CLINDAMYCIN PHOSPHATE 900 MG/50ML
900 INJECTION, SOLUTION INTRAVENOUS
Status: COMPLETED | OUTPATIENT
Start: 2024-07-26 | End: 2024-07-26

## 2024-07-26 RX ORDER — ONDANSETRON HYDROCHLORIDE 2 MG/ML
INJECTION, SOLUTION INTRAVENOUS
Status: COMPLETED
Start: 2024-07-26 | End: 2024-07-26

## 2024-07-26 RX ORDER — PROCHLORPERAZINE EDISYLATE 5 MG/ML
5 INJECTION INTRAMUSCULAR; INTRAVENOUS ONCE AS NEEDED
Status: COMPLETED | OUTPATIENT
Start: 2024-07-26 | End: 2024-07-26

## 2024-07-26 RX ORDER — HYDROMORPHONE HYDROCHLORIDE 1 MG/ML
INJECTION, SOLUTION INTRAMUSCULAR; INTRAVENOUS; SUBCUTANEOUS
Status: DISCONTINUED
Start: 2024-07-26 | End: 2024-07-26 | Stop reason: HOSPADM

## 2024-07-26 RX ORDER — MEPERIDINE HYDROCHLORIDE 25 MG/ML
12.5 INJECTION INTRAMUSCULAR; INTRAVENOUS; SUBCUTANEOUS ONCE
Status: DISCONTINUED | OUTPATIENT
Start: 2024-07-26 | End: 2024-07-26 | Stop reason: HOSPADM

## 2024-07-26 RX ORDER — OXYCODONE AND ACETAMINOPHEN 5; 325 MG/1; MG/1
TABLET ORAL
Status: DISCONTINUED
Start: 2024-07-26 | End: 2024-07-26 | Stop reason: HOSPADM

## 2024-07-26 RX ORDER — SODIUM CHLORIDE 9 MG/ML
INJECTION, SOLUTION INTRAVENOUS CONTINUOUS
Status: DISCONTINUED | OUTPATIENT
Start: 2024-07-26 | End: 2024-07-26 | Stop reason: HOSPADM

## 2024-07-26 RX ORDER — OXYCODONE AND ACETAMINOPHEN 5; 325 MG/1; MG/1
2 TABLET ORAL ONCE
Status: COMPLETED | OUTPATIENT
Start: 2024-07-26 | End: 2024-07-26

## 2024-07-26 RX ORDER — HYDROMORPHONE HYDROCHLORIDE 1 MG/ML
0.2 INJECTION, SOLUTION INTRAMUSCULAR; INTRAVENOUS; SUBCUTANEOUS EVERY 5 MIN PRN
Status: DISCONTINUED | OUTPATIENT
Start: 2024-07-26 | End: 2024-07-26 | Stop reason: HOSPADM

## 2024-07-26 RX ORDER — PROPOFOL 10 MG/ML
VIAL (ML) INTRAVENOUS
Status: DISCONTINUED | OUTPATIENT
Start: 2024-07-26 | End: 2024-07-26

## 2024-07-26 RX ORDER — OXYCODONE HYDROCHLORIDE 5 MG/1
5 TABLET ORAL EVERY 6 HOURS PRN
Qty: 12 TABLET | Refills: 0 | Status: SHIPPED | OUTPATIENT
Start: 2024-07-26

## 2024-07-26 RX ORDER — IPRATROPIUM BROMIDE AND ALBUTEROL SULFATE 2.5; .5 MG/3ML; MG/3ML
3 SOLUTION RESPIRATORY (INHALATION) ONCE AS NEEDED
Status: DISCONTINUED | OUTPATIENT
Start: 2024-07-26 | End: 2024-07-26 | Stop reason: HOSPADM

## 2024-07-26 RX ORDER — GLUCAGON 1 MG
1 KIT INJECTION
Status: DISCONTINUED | OUTPATIENT
Start: 2024-07-26 | End: 2024-07-26 | Stop reason: HOSPADM

## 2024-07-26 RX ORDER — PROCHLORPERAZINE EDISYLATE 5 MG/ML
INJECTION INTRAMUSCULAR; INTRAVENOUS
Status: DISCONTINUED
Start: 2024-07-26 | End: 2024-07-26 | Stop reason: HOSPADM

## 2024-07-26 RX ORDER — ONDANSETRON HYDROCHLORIDE 2 MG/ML
4 INJECTION, SOLUTION INTRAVENOUS ONCE
Status: DISCONTINUED | OUTPATIENT
Start: 2024-07-26 | End: 2024-07-26 | Stop reason: HOSPADM

## 2024-07-26 RX ORDER — SODIUM CHLORIDE, SODIUM LACTATE, POTASSIUM CHLORIDE, CALCIUM CHLORIDE 600; 310; 30; 20 MG/100ML; MG/100ML; MG/100ML; MG/100ML
INJECTION, SOLUTION INTRAVENOUS CONTINUOUS
Status: DISCONTINUED | OUTPATIENT
Start: 2024-07-26 | End: 2024-07-26 | Stop reason: HOSPADM

## 2024-07-26 RX ORDER — FENTANYL CITRATE 50 UG/ML
INJECTION, SOLUTION INTRAMUSCULAR; INTRAVENOUS
Status: DISCONTINUED | OUTPATIENT
Start: 2024-07-26 | End: 2024-07-26

## 2024-07-26 RX ORDER — HEPARIN SODIUM 5000 [USP'U]/ML
5000 INJECTION, SOLUTION INTRAVENOUS; SUBCUTANEOUS
Status: COMPLETED | OUTPATIENT
Start: 2024-07-26 | End: 2024-07-26

## 2024-07-26 RX ORDER — ONDANSETRON 4 MG/1
4 TABLET, ORALLY DISINTEGRATING ORAL EVERY 8 HOURS PRN
Qty: 15 TABLET | Refills: 0 | Status: SHIPPED | OUTPATIENT
Start: 2024-07-26

## 2024-07-26 RX ORDER — LIDOCAINE HYDROCHLORIDE 20 MG/ML
INJECTION, SOLUTION EPIDURAL; INFILTRATION; INTRACAUDAL; PERINEURAL
Status: DISCONTINUED | OUTPATIENT
Start: 2024-07-26 | End: 2024-07-26

## 2024-07-26 RX ORDER — DIPHENHYDRAMINE HYDROCHLORIDE 50 MG/ML
25 INJECTION INTRAMUSCULAR; INTRAVENOUS ONCE AS NEEDED
Status: DISCONTINUED | OUTPATIENT
Start: 2024-07-26 | End: 2024-07-26 | Stop reason: HOSPADM

## 2024-07-26 RX ADMIN — ONDANSETRON 4 MG: 2 INJECTION INTRAMUSCULAR; INTRAVENOUS at 11:07

## 2024-07-26 RX ADMIN — SUCCINYLCHOLINE CHLORIDE 100 MG: 20 INJECTION, SOLUTION INTRAMUSCULAR; INTRAVENOUS; PARENTERAL at 09:07

## 2024-07-26 RX ADMIN — OXYCODONE HYDROCHLORIDE AND ACETAMINOPHEN 2 TABLET: 5; 325 TABLET ORAL at 11:07

## 2024-07-26 RX ADMIN — LIDOCAINE HYDROCHLORIDE 50 MG: 20 INJECTION, SOLUTION EPIDURAL; INFILTRATION; INTRACAUDAL; PERINEURAL at 09:07

## 2024-07-26 RX ADMIN — CLINDAMYCIN PHOSPHATE 900 MG: 900 INJECTION, SOLUTION INTRAVENOUS at 10:07

## 2024-07-26 RX ADMIN — SODIUM CHLORIDE, POTASSIUM CHLORIDE, SODIUM LACTATE AND CALCIUM CHLORIDE: 600; 310; 30; 20 INJECTION, SOLUTION INTRAVENOUS at 09:07

## 2024-07-26 RX ADMIN — PROCHLORPERAZINE EDISYLATE 5 MG: 5 INJECTION INTRAMUSCULAR; INTRAVENOUS at 10:07

## 2024-07-26 RX ADMIN — PROPOFOL 200 MG: 10 INJECTION, EMULSION INTRAVENOUS at 09:07

## 2024-07-26 RX ADMIN — MIDAZOLAM HYDROCHLORIDE 2 MG: 1 INJECTION, SOLUTION INTRAMUSCULAR; INTRAVENOUS at 09:07

## 2024-07-26 RX ADMIN — HEPARIN SODIUM 5000 UNITS: 5000 INJECTION, SOLUTION INTRAVENOUS; SUBCUTANEOUS at 08:07

## 2024-07-26 RX ADMIN — FENTANYL CITRATE 100 MCG: 50 INJECTION, SOLUTION INTRAMUSCULAR; INTRAVENOUS at 09:07

## 2024-07-26 RX ADMIN — HYDROMORPHONE HYDROCHLORIDE 0.5 MG: 1 INJECTION, SOLUTION INTRAMUSCULAR; INTRAVENOUS; SUBCUTANEOUS at 10:07

## 2024-07-26 NOTE — DISCHARGE INSTRUCTIONS
UMBILICAL HERNIA  ***** PLEASE KEEP THESE POST OP INSTRUCTIONS WITH YOU UNTIL YOUR  FOLLOW-UP APPOINTMENT *****  · FOLLOW UP: Appointment in [facility]__August 8th @ 11:30 AM_____.  · PAIN: Apply ice pack to abdomen as needed for pain. Alternate Tylenol and Ibuprofen (regular over the counter- follow instructions on the bottle). Take Your prescription pain medication AS PRESCRIBED ONLY for severe pain unrelieved by Tylenol (acetaminophen) or Ibuprofen.  · EXAMPLE: Take Tylenol. Three hours later take Ibuprofen. Three hours after that take Tylenol again, and repeat until no longer needed. If Pain is still bad once you start Tylenol and Ibuprofen, add pain medication. Dont drive or drink alcohol with pain medication. Dont take pain medication more often than it is prescribed to be taken.  INFECTION: Call your doctor at [phone] or report to the emergency room:  - if redness around your incision begins to spread, or you have swelling.  - If your incision has opened up  - If you have green, yellow, or cottage cheese-like drainage coming from your incision  - If you begin to run fever over 100.4 F  - if you are feeling weaker  - INCISION (WOUND) CARE: Leave adhesive glue on your skin until the glue peels away on its own. You may take a shower tomorrow. Wash gently over incision site - do not scrub or peel skin glue. Do not soak your wounds in water for 2 weeks. Do not take baths, swim, or use a hot tub until your doctor says it is okay.  · NAUSEA: You can eat and drink whatever you like as tolerated. You may experience post anesthesia nausea. Apply cold cloths to your face and neck and call your doctor for a prescription for nausea medication. If you have any uncontrolled vomiting that is not resolving within a few hours, report to your emergency room. Resume all medications tomorrow.  · ACTIVITY: Do not lift anything that is heavier than 10 lbs. (4.5 kg) for 1 week. Return to work when you feel well enough:  your pain is controlled without the narcotic pain medication and you feel strong enough. Do not drink alcohol or drive today, or as long as you are on pain medication.  · Notify MD of any moderate to severe pain unrelieved by pain medicine or for any signs of infection including fever above 100.4, excessive redness or swelling, yellow/green foul- smelling drainage, nausea or vomiting. Clinics number is [phone]. If it is after business hours or emergency call [phone] and state Im having post op complications and need to speak to the surgeon on call.  · Hope you have a smooth recovery!

## 2024-07-26 NOTE — DISCHARGE SUMMARY
[facility]  General Surgery  Discharge Summary      Patient Name: Ellis Ramos  MRN: [id number]  Admission Date: 7/26/2024  Hospital Length of Stay: 0 days  Discharge Date and Time:  07/26/2024 10:55 AM  Attending Physician: Matthew Pugh Jr.,*   Discharging Provider: Ishan Cast MD  Primary Care Provider: Lida Hoyos NP     HPI:  Ellis Ramos is a 28 y.o. female s/p LEEP, Hysteroscopy D&C, Diagnostic laparoscopy, Chromotubation, and Cystoscopy with hydrodistension for Cervical dysplasia, Abnormal uterine bleeding, Pelvic pain, Uterine polyps, Interstitial cystitis, and desired fertility on 4/11/2024. Patient presents with 0.5 incisional/umbilical hernia of subcutaneous fat. No n/v/d but endorses abdominal pain. Has tried gabapentin and trigger point injection with no relief of sx. No other sugical history. Denies smoking tobacco, alcohol or drugs. BMI 25.6. Would like surgery.        Procedure(s) (LRB):  REPAIR, HERNIA, UMBILICAL (N/A)     Hospital Course:     Patient presented for scheduled incisional umbilical hernia repair.  Patient tolerated procedure well.  Patient was washed in the PACU in postop area and now has met all discharge requirements.  Patient will be seen in clinic follow-up.    Consults:     Significant Diagnostic Studies: N/A    Pending Diagnostic Studies:       None          There are no hospital problems to display for this patient.     Discharged Condition: good    Disposition: Home or Self Care    Follow Up:    Patient Instructions:      Diet Adult Regular     Notify your health care provider if you experience any of the following:  temperature >100.4     Notify your health care provider if you experience any of the following:  persistent nausea and vomiting or diarrhea     Notify your health care provider if you experience any of the following:  severe uncontrolled pain     Notify your health care provider if you experience any of the following:   redness, tenderness, or signs of infection (pain, swelling, redness, odor or green/yellow discharge around incision site)     Weight bearing restrictions (specify):   Order Comments: 10lbs for 2 weeks     Medications:  Reconciled Home Medications:      Medication List        CHANGE how you take these medications      ondansetron 4 MG Tbdl  Commonly known as: ZOFRAN-ODT  Take 1 tablet (4 mg total) by mouth every 8 (eight) hours as needed.  What changed:   when to take this  reasons to take this     oxyCODONE 5 MG immediate release tablet  Commonly known as: ROXICODONE  Take 1 tablet (5 mg total) by mouth every 6 (six) hours as needed for Pain.  What changed: when to take this            CONTINUE taking these medications      acetaminophen 500 MG tablet  Commonly known as: TYLENOL  Take 2 tablets (1,000 mg total) by mouth every 6 (six) hours as needed for Pain.     gabapentin 100 MG capsule  Commonly known as: NEURONTIN  Take 1 capsule (100 mg total) by mouth every evening.     LIDOcaine 5 %  Commonly known as: LIDODERM  Place 1 patch onto the skin once daily. Remove & Discard patch within 12 hours or as directed by MD. Apply on abdomen near but not directly touching incision.     senna 8.6 mg tablet  Commonly known as: SENNA  Take 1 tablet by mouth daily as needed for Constipation.            ASK your doctor about these medications      hyoscyamine 0.125 mg Subl  Place 1 tablet (0.125 mg total) under the tongue every 4 (four) hours as needed (bladder spasm).     ibuprofen 600 MG tablet  Commonly known as: ADVIL,MOTRIN  Take 1 tablet (600 mg total) by mouth 3 (three) times daily.              Ishan Cast MD  General Surgery  [facility]

## 2024-07-26 NOTE — INTERVAL H&P NOTE
The patient has been examined and the H&P has been reviewed:    I concur with the findings and no changes have occurred since H&P was written.    Surgery risks, benefits and alternative options discussed and understood by patient/family.    Pt states she may like to get pregnant within the next year thus we will proceed with a primary repair. R/b/a discussed. Pt verbalized understanding and is amenable to plan.     Ishan Cast MD  [facility] General Surgery             There are no hospital problems to display for this patient.

## 2024-07-26 NOTE — OP NOTE
[facility] - Periop Services  Surgery Department  Operative Note    SUMMARY     Date of Procedure: 7/26/2024     Procedure: Procedure(s) (LRB):  REPAIR, HERNIA, UMBILICAL (N/A)     Surgeons and Role:     * Matthew Pugh Jr., MD - Primary     * Jaclyn Schmitz MD - Resident - Chief     * Ishan Cast MD    Assisting Surgeon: None    Pre-Operative Diagnosis: * No pre-op diagnosis entered *    Post-Operative Diagnosis: Post-Op Diagnosis Codes:     * Umbilical hernia without obstruction and without gangrene [K42.9]    Anesthesia: General    Operative Findings (including complications, if any): Umbilical Hernia with no signs of obstruction     Description of Technical Procedures:   After proper consent was obtained, patient was taken to the operating room and placed supine on the operating room table.  GETA was administered without anesthesia Services.  Patient was prepped and draped in a sterile fashion.  A time-out was conducted    An infraumbilical curvilinear incision was made at the umbilical crease.  Dissection was taken down using electrocautery all the way until the stalk was accessed.  Using blunt dissection with hemostats, the umbilical stalk was circumferentially isolated.  Using electrocautery, the stalk was ligated near its base.  A small defect within the umbilical stalk was noted at the floor of the surgical area.  A figure-of-eight 0 Ethibond suture was placed at the stalk base to reinforce the area.  The surgical area was irrigated with saline.  A 3-0 Vicryl suture was used to tack down the umbilical stalk in an normal anatomic position.  A layered closure with 3-0 Vicryl and 4-0 Monocryl was applied to the incision.  A local anesthetic block was administered. Dermabond was applied.     All counts were correct x2 at the end of the case.        Estimated Blood Loss (EBL): Minimal           Implants: * No implants in log *    Specimens:   Specimen (24h ago, onward)      None                     Condition: Stable    Disposition: PACU - hemodynamically stable.    Ishan Cast MD  [facility]

## 2024-07-28 ENCOUNTER — PATIENT MESSAGE (OUTPATIENT)
Dept: ADMINISTRATIVE | Facility: OTHER | Age: 29
End: 2024-07-28
Payer: COMMERCIAL

## 2024-07-29 VITALS
RESPIRATION RATE: 20 BRPM | WEIGHT: 157.81 LBS | DIASTOLIC BLOOD PRESSURE: 67 MMHG | BODY MASS INDEX: 25.47 KG/M2 | TEMPERATURE: 97 F | SYSTOLIC BLOOD PRESSURE: 107 MMHG | HEART RATE: 74 BPM | OXYGEN SATURATION: 100 %

## 2024-07-29 NOTE — OR NURSING
Following message sent by secure caht to Dr Stacey Teague who is on call for general surgery ---> Contacting you as on call gen surg. RE: Ellis Ramos, s/p incisional umbilical hernia repair on 7/26. She would like a call from a surgeon to discuss the continued nausea that she has, [phone] (Mobile). She was sent home with zofran-ODT, but says that it's not really helping. Thank-you

## 2024-07-31 RX ORDER — PROMETHAZINE HYDROCHLORIDE 25 MG/1
25 TABLET ORAL EVERY 6 HOURS PRN
Qty: 20 TABLET | Refills: 0 | Status: SHIPPED | OUTPATIENT
Start: 2024-07-31

## 2024-08-08 ENCOUNTER — OFFICE VISIT (OUTPATIENT)
Dept: SURGERY | Facility: CLINIC | Age: 29
End: 2024-08-08
Payer: COMMERCIAL

## 2024-08-08 VITALS
RESPIRATION RATE: 18 BRPM | SYSTOLIC BLOOD PRESSURE: 101 MMHG | DIASTOLIC BLOOD PRESSURE: 69 MMHG | BODY MASS INDEX: 26.03 KG/M2 | HEIGHT: 66 IN | HEART RATE: 84 BPM | OXYGEN SATURATION: 99 % | WEIGHT: 162 LBS | TEMPERATURE: 98 F

## 2024-08-08 DIAGNOSIS — K42.9 UMBILICAL HERNIA WITHOUT OBSTRUCTION AND WITHOUT GANGRENE: Primary | ICD-10-CM

## 2024-08-08 PROCEDURE — 99213 OFFICE O/P EST LOW 20 MIN: CPT | Mod: PBBFAC

## 2024-08-12 ENCOUNTER — OFFICE VISIT (OUTPATIENT)
Dept: GYNECOLOGY | Facility: CLINIC | Age: 29
End: 2024-08-12
Payer: COMMERCIAL

## 2024-08-12 VITALS
DIASTOLIC BLOOD PRESSURE: 80 MMHG | HEIGHT: 66 IN | TEMPERATURE: 99 F | SYSTOLIC BLOOD PRESSURE: 118 MMHG | BODY MASS INDEX: 25.55 KG/M2 | WEIGHT: 159 LBS | HEART RATE: 84 BPM | OXYGEN SATURATION: 100 %

## 2024-08-12 DIAGNOSIS — G58.9 NERVE ENTRAPMENT: ICD-10-CM

## 2024-08-12 DIAGNOSIS — R35.0 URINARY FREQUENCY: Primary | ICD-10-CM

## 2024-08-12 LAB
BACTERIA #/AREA URNS AUTO: ABNORMAL /HPF
BILIRUB UR QL STRIP.AUTO: NEGATIVE
CLARITY UR: ABNORMAL
COLOR UR AUTO: ABNORMAL
GLUCOSE UR QL STRIP: NORMAL
HGB UR QL STRIP: NEGATIVE
HYALINE CASTS #/AREA URNS LPF: ABNORMAL /LPF
KETONES UR QL STRIP: NEGATIVE
LEUKOCYTE ESTERASE UR QL STRIP: NEGATIVE
MUCOUS THREADS URNS QL MICRO: ABNORMAL /LPF
NITRITE UR QL STRIP: NEGATIVE
PH UR STRIP: 6.5 [PH]
PROT UR QL STRIP: ABNORMAL
RBC #/AREA URNS AUTO: ABNORMAL /HPF
SP GR UR STRIP.AUTO: 1.02 (ref 1–1.03)
SQUAMOUS #/AREA URNS LPF: ABNORMAL /HPF
UROBILINOGEN UR STRIP-ACNC: NORMAL
WBC #/AREA URNS AUTO: ABNORMAL /HPF

## 2024-08-12 PROCEDURE — 81001 URINALYSIS AUTO W/SCOPE: CPT

## 2024-08-12 PROCEDURE — 99213 OFFICE O/P EST LOW 20 MIN: CPT | Mod: PBBFAC

## 2024-08-12 PROCEDURE — 20550 NJX 1 TENDON SHEATH/LIGAMENT: CPT | Mod: PBBFAC | Performed by: OBSTETRICS & GYNECOLOGY

## 2024-08-12 RX ORDER — LIDOCAINE HYDROCHLORIDE 10 MG/ML
10 INJECTION, SOLUTION EPIDURAL; INFILTRATION; INTRACAUDAL; PERINEURAL
Status: COMPLETED | OUTPATIENT
Start: 2024-08-12 | End: 2024-08-12

## 2024-08-12 RX ADMIN — LIDOCAINE HYDROCHLORIDE 100 MG: 10 INJECTION, SOLUTION EPIDURAL; INFILTRATION; INTRACAUDAL; PERINEURAL at 10:08

## 2024-08-12 NOTE — PROGRESS NOTES
[facility] GYNECOLOGY CLINIC NOTE     Ellis Ramos is a 28 y.o.  presenting to GYN clinic for repeat trigger point injection for RLQ laparoscopic port site nerve entrapment s/p LEEP, hysteroscopy D&C, diagnostic laparoscopy with chromotubation, and cystoscopy with hydrodistension for cervical dysplasia, AUB-P, pelvic pain, and interstitial cystitis, respectively, in the setting of desired fertility on 24. She has completed three trigger point injections post-operatively (24, 24, and 24) with moderate improvement in pain in her RLQ. She continues to use Gabapentin and Lidocaine patches as needed. Of note, during her most recent visit on 24, she noted significant sensitivity at her umbilicus, for which she underwent an umbilical hernia repair with General Surgery on 24 and reports improvement in that pain/sensitivity, as well.   She endorses urinary frequency, worse at night, in the setting of interstitial cystitis. She states that she tries her best to avoid the foods that trigger her symptoms, but has occasionally eaten them recently. Denies fevers/chills, nausea/vomiting, dysuria, and hematuria.     OB History          0    Para   0    Term   0       0    AB   0    Living   0         SAB   0    IAB   0    Ectopic   0    Multiple   0    Live Births   0                Past Medical History:   Diagnosis Date    Abnormal Pap smear of cervix       Past Surgical History:   Procedure Laterality Date    CHROMOTUBATION OF FALLOPIAN TUBE Bilateral 2024    Procedure: CHROMOTUBATION, OVIDUCT;  Surgeon: Yola Hair MD;  Location: [facility] OR;  Service: OB/GYN;  Laterality: Bilateral;    CONIZATION OF CERVIX USING LOOP ELECTROSURGICAL EXCISION PROCEDURE (LEEP) N/A 2024    Procedure: LEEP CONIZATION, CERVIX;  Surgeon: Yola Hair MD;  Location: [facility] OR;  Service: OB/GYN;  Laterality: N/A;    CYST REMOVAL      On right foot    CYSTOSCOPY N/A 2024     "Procedure: CYSTOSCOPY;  Surgeon: Yola Hair MD;  Location: [facility];  Service: OB/GYN;  Laterality: N/A;    DIAGNOSTIC LAPAROSCOPY N/A 4/11/2024    Procedure: LAPAROSCOPY, DIAGNOSTIC;  Surgeon: Yola Hair MD;  Location: [facility] OR;  Service: OB/GYN;  Laterality: N/A;  2 cameras, 2 light cords  Top & bottom @same time    HYSTEROSCOPY WITH DILATION AND CURETTAGE OF UTERUS N/A 4/11/2024    Procedure: HYSTEROSCOPY, WITH DILATION AND CURETTAGE OF UTERUS;  Surgeon: Yola Hair MD;  Location: [facility] OR;  Service: OB/GYN;  Laterality: N/A;    REPAIR, HERNIA, UMBILICAL N/A 7/26/2024    Procedure: REPAIR, HERNIA, UMBILICAL;  Surgeon: Matthew Pugh Jr., MD;  Location: [facility];  Service: General;  Laterality: N/A;      Current Outpatient Medications   Medication Instructions    acetaminophen (TYLENOL) 1,000 mg, Oral, Every 6 hours PRN    gabapentin (NEURONTIN) 100 mg, Oral, Nightly    hyoscyamine 0.125 mg, Sublingual, Every 4 hours PRN    ibuprofen (ADVIL,MOTRIN) 600 mg, Oral, 3 times daily    ibuprofen (ADVIL,MOTRIN) 800 mg, Oral, 3 times daily    LIDOcaine (LIDODERM) 5 % 1 patch, Transdermal, Daily, Remove & Discard patch within 12 hours or as directed by MD. Apply on abdomen near but not directly touching incision.    ondansetron (ZOFRAN-ODT) 4 mg, Oral, Every 8 hours PRN    oxyCODONE (ROXICODONE) 5 mg, Oral, Every 6 hours PRN    promethazine (PHENERGAN) 25 mg, Oral, Every 6 hours PRN    SENNA 8.6 mg tablet 1 tablet, Oral, Daily PRN     Social History     Tobacco Use    Smoking status: Never     Passive exposure: Never    Smokeless tobacco: Never   Substance Use Topics    Alcohol use: Not Currently     Comment: Occasionally    Drug use: Yes     Types: Marijuana     Comment: occ     Review of Systems  Pertinent items are noted in HPI.     Objective:     /80 (BP Location: Right arm)   Pulse 84   Temp 98.7 °F (37.1 °C)   Ht 5' 6" (1.676 m)   Wt 72.1 kg (159 lb)   SpO2 100%   BMI 25.66 kg/m² "   Physical Exam:  Gen: Well-nourished, well-developed female appearing stated age. Alert, cooperative, in no acute distress.  CV: Regular rate.  Chest: No conversational dyspnea.   Abdomen: Umbilical incision with overlying Dermabond, minimal tenderness to palpation supraumbilically, no rebound tenderness or guarding; RLQ laparoscopic port site incision with moderate tenderness to deep palpation  Extrem: Extremities normal, atraumatic, non-tender calves.    Procedures:     Trigger Point Injection Procedure Note    Date of procedure:  2024    Procedure(s):   Trigger point injection    Indication: RLQ nerve entrapment    Procedure technique:     Ellis Ramos was counseled on risks, benefits, and alternatives to her procedure today. A consent form was reviewed and signed. All questions were answered to her satisfaction.  After discussing and obtaining informed consent, a time-out was performed confirming her identity, surgical site, planned procedure, and that all necessary equipment was available. She was positioned on the exam table in supine position. The RLQ laparoscopic port site was cleansed with ChoraPrep. A total of 15 mL of 1% Lidocaine without epinephrine was injected along the dermis and subcutaneous tissue deep to the incision with dry needling performed throughout. The patient tolerated the procedure well without complications.    Assessment:     28 y.o.  here for repeat trigger point injection for nerve entrapment at her RLQ laparoscopic port site.    1. Urinary frequency  Urinalysis, Reflex to Urine Culture      2. Nerve entrapment          Plan:     Problem List Items Addressed This Visit          Neuro    Nerve entrapment     Patient improving with each trigger point injection, as well as use of Gabapentin 100mg qhs and Lidocaine patches as needed. She was instructed by General Surgery to avoid heavy lifting for an additional two weeks in the setting of her umbilical hernia repair. She  is cleared to return to work without lifting restrictions from a post-operative standpoint following her gynecological surgery as soon as she is cleared by General Surgery. Kenalog used in trigger point injection most recently on 05/17/24 - will plan to use during next RLQ trigger point injection in 4 weeks.           Other Visit Diagnoses       Urinary frequency    -  Primary    Relevant Orders    Urinalysis, Reflex to Urine Culture (Completed)          Return to clinic in 4 weeks    Discussed patient and plan with Dr. Murrell.    Kristi Godfrey MD  [facility], PGY-4

## 2024-08-15 ENCOUNTER — TELEPHONE (OUTPATIENT)
Dept: GYNECOLOGY | Facility: CLINIC | Age: 29
End: 2024-08-15
Payer: COMMERCIAL

## 2024-08-15 NOTE — TELEPHONE ENCOUNTER
----- Message from Candido Moyer sent at 8/15/2024  9:10 AM CDT -----  The above patient called and said she is now bleeding  she said at her last visit that dr vidales told her she was not supposed to be bleeding at all. She is also having cramping. Please advise, thanks

## 2024-08-15 NOTE — TELEPHONE ENCOUNTER
Pt voiced she started cramping last night. She voiced her is bleeding started this morning. She is changing a pad every 3/4 hours. She stated that she is on Depo and that Dr. Murrell said she shouldn't have bleeding. Pt is concerned please advise, thank you.

## 2024-08-29 ENCOUNTER — CLINICAL SUPPORT (OUTPATIENT)
Dept: GYNECOLOGY | Facility: CLINIC | Age: 29
End: 2024-08-29
Payer: COMMERCIAL

## 2024-08-29 DIAGNOSIS — N93.9 ABNORMAL UTERINE BLEEDING: Primary | ICD-10-CM

## 2024-08-29 PROCEDURE — 96372 THER/PROPH/DIAG INJ SC/IM: CPT | Mod: PBBFAC

## 2024-08-29 PROCEDURE — 99211 OFF/OP EST MAY X REQ PHY/QHP: CPT | Mod: PBBFAC,25

## 2024-08-29 RX ORDER — MEDROXYPROGESTERONE ACETATE 150 MG/ML
150 INJECTION, SUSPENSION INTRAMUSCULAR
Status: COMPLETED | OUTPATIENT
Start: 2024-08-29 | End: 2024-08-29

## 2024-08-29 RX ADMIN — MEDROXYPROGESTERONE ACETATE 150 MG: 150 INJECTION, SUSPENSION INTRAMUSCULAR at 10:08

## 2024-09-19 ENCOUNTER — OFFICE VISIT (OUTPATIENT)
Dept: SURGERY | Facility: CLINIC | Age: 29
End: 2024-09-19
Payer: COMMERCIAL

## 2024-09-19 VITALS
HEART RATE: 63 BPM | SYSTOLIC BLOOD PRESSURE: 121 MMHG | BODY MASS INDEX: 26.36 KG/M2 | HEIGHT: 66 IN | WEIGHT: 164 LBS | DIASTOLIC BLOOD PRESSURE: 84 MMHG | TEMPERATURE: 98 F | OXYGEN SATURATION: 100 %

## 2024-09-19 DIAGNOSIS — K42.9 UMBILICAL HERNIA WITHOUT OBSTRUCTION AND WITHOUT GANGRENE: Primary | ICD-10-CM

## 2024-09-19 PROCEDURE — 99213 OFFICE O/P EST LOW 20 MIN: CPT | Mod: PBBFAC

## 2024-09-19 RX ORDER — METHOCARBAMOL 500 MG/1
500 TABLET, FILM COATED ORAL 3 TIMES DAILY
Qty: 90 TABLET | Refills: 0 | Status: SHIPPED | OUTPATIENT
Start: 2024-09-19 | End: 2024-10-19

## 2024-09-19 NOTE — PROGRESS NOTES
Patient seen by Dr. MELVI Schmitz. Will return in one week after trying medications for pain. Written and verbal discharge instructions given.

## 2024-09-19 NOTE — PROGRESS NOTES
I have reviewed the notes, assessments, and/or procedures performed by Dr Schmitz, I concur with her/his documentation of Ellis Ramos.  Date of Service: 9/19/2024    [facility]

## 2024-09-19 NOTE — PROGRESS NOTES
[facility] Clinic Note    CC:   Ellis Ramos is a 28 y.o. female presenting with post op pain    HPI:   Patient is approximately 6 weeks post op repair of primary umbilical hernia with 0 ethibond suture. She returned to work on Sunday and has been having some intermittent sharp shooting pain stemming from the incision that is worse with activity. She otherwise doing well.      Objective:  Physical Exam:  Awake, alert  Normocephalic, atraumatic  ALISSON  RR  Abd soft, NT  Incision well healed  Skin warm, dry  Moves all extremities  No focal neurologic deficits        A/P:   28 y.o. female s/p open primary repair umbilical hernia. Post operative pain expected as she returns to normal activity but given size of hernia defect and time since surgery do not feel she warrants any further work limitations.     - Trial robaxin, will send to pharmacy  - Recheck 1 week to yessica Schmitz  PGY5 General Surgery

## 2024-10-11 ENCOUNTER — OFFICE VISIT (OUTPATIENT)
Dept: GYNECOLOGY | Facility: CLINIC | Age: 29
End: 2024-10-11
Payer: COMMERCIAL

## 2024-10-11 VITALS
HEIGHT: 66 IN | OXYGEN SATURATION: 100 % | SYSTOLIC BLOOD PRESSURE: 100 MMHG | DIASTOLIC BLOOD PRESSURE: 69 MMHG | RESPIRATION RATE: 18 BRPM | WEIGHT: 166.38 LBS | TEMPERATURE: 98 F | HEART RATE: 72 BPM | BODY MASS INDEX: 26.74 KG/M2

## 2024-10-11 DIAGNOSIS — N30.10 INTERSTITIAL CYSTITIS: ICD-10-CM

## 2024-10-11 DIAGNOSIS — G58.9 NERVE ENTRAPMENT: Primary | ICD-10-CM

## 2024-10-11 PROCEDURE — 99214 OFFICE O/P EST MOD 30 MIN: CPT | Mod: PBBFAC

## 2024-10-11 NOTE — PROGRESS NOTES
[facility] OB/GYN CLINIC NOTE  [facility]  [address] [place] [address]  Phone: [phone]  Fax: [phone]    Subjective:     Ellis Ramos is a 29 y.o.  who presents today for follow-up for RLQ laparoscopic port site nerve entrapment s/p LEEP, hysteroscopy D&C, diagnostic laparoscopy with chromotubation, and cystoscopy with hydrodistension for cervical dysplasia, AUB-P, pelvic pain, and interstitial cystitis, respectively, in the setting of desired fertility on 24.     She has thus far completed four trigger point injections post-operatively (24- no steroid, 24- steroid used, 24- no steroid, 24-no steroid). Today she reports intermittent sharp pain at RLQ laparoscopic port site that lasts for a couple of minutes at a time. She states that the pain has markedly improved since her procedure in April. Since she is only having mild pain and feels that the last injection was very painful, she elects to not do an injection today.     Of note, she is s/p umbilical hernia repair on 24. She is still having some pain in her umbilicus that is associated with heavy lifting that she does for work. Reports improved pain since pre-procedure.     Regarding her IC, she is avoiding all bladder irritants and feels that this is improving her symptoms. She is still having some mild urinary frequency that's more prevalent at night. She denies dysuria and hematuria.     MedHx:   Past Medical History:   Diagnosis Date    Abnormal Pap smear of cervix      SurgHx:   Past Surgical History:   Procedure Laterality Date    CHROMOTUBATION OF FALLOPIAN TUBE Bilateral 2024    Procedure: CHROMOTUBATION, OVIDUCT;  Surgeon: Yola Hair MD;  Location: [facility] OR;  Service: OB/GYN;  Laterality: Bilateral;    CONIZATION OF CERVIX USING LOOP ELECTROSURGICAL EXCISION PROCEDURE (LEEP) N/A 2024    Procedure: LEEP CONIZATION, CERVIX;  Surgeon: Yola Hair MD;  Location: [facility] OR;  Service: OB/GYN;   "Laterality: N/A;    CYST REMOVAL      On right foot    CYSTOSCOPY N/A 2024    Procedure: CYSTOSCOPY;  Surgeon: Yola Hair MD;  Location: [facility] OR;  Service: OB/GYN;  Laterality: N/A;    DIAGNOSTIC LAPAROSCOPY N/A 2024    Procedure: LAPAROSCOPY, DIAGNOSTIC;  Surgeon: Yola Hair MD;  Location: [facility] OR;  Service: OB/GYN;  Laterality: N/A;  2 cameras, 2 light cords  Top & bottom @same time    HYSTEROSCOPY WITH DILATION AND CURETTAGE OF UTERUS N/A 2024    Procedure: HYSTEROSCOPY, WITH DILATION AND CURETTAGE OF UTERUS;  Surgeon: Yola Hair MD;  Location: [facility] OR;  Service: OB/GYN;  Laterality: N/A;    REPAIR, HERNIA, UMBILICAL N/A 2024    Procedure: REPAIR, HERNIA, UMBILICAL;  Surgeon: Matthew Pugh Jr., MD;  Location: [facility];  Service: General;  Laterality: N/A;     Review of Systems  Denies fevers, chills, headache, blurry vision, nausea, vomiting, dizziness, or syncope.   Denies chest pain, shortness of breath, RUQ pain, or calf pain.    Objective:     Vitals:    10/11/24 0747   BP: 100/69   Pulse: 72   Resp: 18   Temp: 98.2 °F (36.8 °C)   TempSrc: Oral   SpO2: 100%   Weight: 75.5 kg (166 lb 6.4 oz)   Height: 5' 6" (1.676 m)     Body mass index is 26.86 kg/m².    Physical Exam:     General: alert and oriented, in no acute distress  Lungs: clear to auscultation bilaterally, no conversational dyspnea  Heart: regular rate and rhythm  Abdomen: RLQ laparoscopic scar present without discharge or erythema. Umbilical scar present without discharge or erythema. Soft, non-distended, non tender to palpation, no involuntary guarding, no rebound tenderness  Extremities: Normal, atraumatic, non-edematous, No cords or calf tenderness, No significant calf/ankle edema    Assessment/Plan:    Ellis Ramos is a 29 y.o.  with RLQ laparoscopic port site nerve entrapment.     Nerve entrapment   Patient elected to not receive trigger point injection today due to pain significantly improved  "   Will call clinic to schedule appointment for injection if she deems the pain to be worsening or she would like to proceed with further injections  Next injection to be performed with Kenalog as patient has only had one injection with steroid thus far   Interstitial cystitis   Continue to avoid bladder irritants    Future Appointments   Date Time Provider Department Center   10/31/2024 10:00 AM NURSE, [facility] Joaquin Lyle   11/1/2024 10:30 [facility]    11/21/2024  8:30 [facility]       Patient and plan were discussed with Dr. Murrell.    Ariana Martinez, MS3   [facility]      Mary Roach,   [facility] PGY-3

## 2024-10-20 ENCOUNTER — HOSPITAL ENCOUNTER (EMERGENCY)
Facility: HOSPITAL | Age: 29
Discharge: HOME | End: 2024-10-20
Attending: EMERGENCY MEDICINE
Payer: COMMERCIAL

## 2024-10-20 VITALS
OXYGEN SATURATION: 100 % | BODY MASS INDEX: 26.33 KG/M2 | TEMPERATURE: 98 F | RESPIRATION RATE: 18 BRPM | HEIGHT: 67 IN | DIASTOLIC BLOOD PRESSURE: 72 MMHG | HEART RATE: 65 BPM | SYSTOLIC BLOOD PRESSURE: 109 MMHG | WEIGHT: 167.75 LBS

## 2024-10-20 DIAGNOSIS — A05.9 FOOD POISONING: ICD-10-CM

## 2024-10-20 DIAGNOSIS — R11.2 NAUSEA AND VOMITING, UNSPECIFIED VOMITING TYPE: Primary | ICD-10-CM

## 2024-10-20 LAB
ALBUMIN SERPL-MCNC: 4.1 G/DL (ref 3.5–5)
ALBUMIN/GLOB SERPL: 1.3 RATIO (ref 1.1–2)
ALP SERPL-CCNC: 59 UNIT/L (ref 40–150)
ALT SERPL-CCNC: 13 UNIT/L (ref 0–55)
ANION GAP SERPL CALC-SCNC: 6 MEQ/L
AST SERPL-CCNC: 17 UNIT/L (ref 5–34)
B-HCG UR QL: NEGATIVE
BACTERIA #/AREA URNS AUTO: ABNORMAL /HPF
BASOPHILS # BLD AUTO: 0.02 X10(3)/MCL
BASOPHILS NFR BLD AUTO: 0.3 %
BILIRUB SERPL-MCNC: 0.5 MG/DL
BILIRUB UR QL STRIP.AUTO: NEGATIVE
BUN SERPL-MCNC: 6.8 MG/DL (ref 7–18.7)
CALCIUM SERPL-MCNC: 9.6 MG/DL (ref 8.4–10.2)
CHLORIDE SERPL-SCNC: 110 MMOL/L (ref 98–107)
CLARITY UR: ABNORMAL
CO2 SERPL-SCNC: 24 MMOL/L (ref 22–29)
COLOR UR AUTO: ABNORMAL
CREAT SERPL-MCNC: 0.85 MG/DL (ref 0.55–1.02)
CREAT/UREA NIT SERPL: 8
CTP QC/QA: YES
EOSINOPHIL # BLD AUTO: 0.08 X10(3)/MCL (ref 0–0.9)
EOSINOPHIL NFR BLD AUTO: 1.3 %
ERYTHROCYTE [DISTWIDTH] IN BLOOD BY AUTOMATED COUNT: 11.9 % (ref 11.5–17)
GFR SERPLBLD CREATININE-BSD FMLA CKD-EPI: >60 ML/MIN/1.73/M2
GLOBULIN SER-MCNC: 3.2 GM/DL (ref 2.4–3.5)
GLUCOSE SERPL-MCNC: 90 MG/DL (ref 74–100)
GLUCOSE UR QL STRIP: NORMAL
HCT VFR BLD AUTO: 44.5 % (ref 37–47)
HGB BLD-MCNC: 14.4 G/DL (ref 12–16)
HGB UR QL STRIP: ABNORMAL
HOLD SPECIMEN: NORMAL
HOLD SPECIMEN: NORMAL
HYALINE CASTS #/AREA URNS LPF: ABNORMAL /LPF
IMM GRANULOCYTES # BLD AUTO: 0.03 X10(3)/MCL (ref 0–0.04)
IMM GRANULOCYTES NFR BLD AUTO: 0.5 %
KETONES UR QL STRIP: NEGATIVE
LEUKOCYTE ESTERASE UR QL STRIP: 250
LIPASE SERPL-CCNC: 14 U/L
LYMPHOCYTES # BLD AUTO: 1.59 X10(3)/MCL (ref 0.6–4.6)
LYMPHOCYTES NFR BLD AUTO: 25.5 %
MCH RBC QN AUTO: 30.3 PG (ref 27–31)
MCHC RBC AUTO-ENTMCNC: 32.4 G/DL (ref 33–36)
MCV RBC AUTO: 93.5 FL (ref 80–94)
MONOCYTES # BLD AUTO: 0.81 X10(3)/MCL (ref 0.1–1.3)
MONOCYTES NFR BLD AUTO: 13 %
MUCOUS THREADS URNS QL MICRO: ABNORMAL /LPF
NEUTROPHILS # BLD AUTO: 3.7 X10(3)/MCL (ref 2.1–9.2)
NEUTROPHILS NFR BLD AUTO: 59.4 %
NITRITE UR QL STRIP: ABNORMAL
NRBC BLD AUTO-RTO: 0 %
PH UR STRIP: 6 [PH]
PLATELET # BLD AUTO: 225 X10(3)/MCL (ref 130–400)
PMV BLD AUTO: 10.3 FL (ref 7.4–10.4)
POTASSIUM SERPL-SCNC: 4.2 MMOL/L (ref 3.5–5.1)
PROT SERPL-MCNC: 7.3 GM/DL (ref 6.4–8.3)
PROT UR QL STRIP: NEGATIVE
RBC # BLD AUTO: 4.76 X10(6)/MCL (ref 4.2–5.4)
RBC #/AREA URNS AUTO: ABNORMAL /HPF
SODIUM SERPL-SCNC: 140 MMOL/L (ref 136–145)
SP GR UR STRIP.AUTO: 1.01 (ref 1–1.03)
SQUAMOUS #/AREA URNS LPF: ABNORMAL /HPF
UROBILINOGEN UR STRIP-ACNC: NORMAL
WBC # BLD AUTO: 6.23 X10(3)/MCL (ref 4.5–11.5)
WBC #/AREA URNS AUTO: ABNORMAL /HPF

## 2024-10-20 PROCEDURE — 85025 COMPLETE CBC W/AUTO DIFF WBC: CPT | Performed by: PHYSICIAN ASSISTANT

## 2024-10-20 PROCEDURE — 25000003 PHARM REV CODE 250: Performed by: PHYSICIAN ASSISTANT

## 2024-10-20 PROCEDURE — 83690 ASSAY OF LIPASE: CPT | Performed by: PHYSICIAN ASSISTANT

## 2024-10-20 PROCEDURE — 81001 URINALYSIS AUTO W/SCOPE: CPT | Performed by: PHYSICIAN ASSISTANT

## 2024-10-20 PROCEDURE — 99284 EMERGENCY DEPT VISIT MOD MDM: CPT

## 2024-10-20 PROCEDURE — 87086 URINE CULTURE/COLONY COUNT: CPT | Performed by: PHYSICIAN ASSISTANT

## 2024-10-20 PROCEDURE — 87186 SC STD MICRODIL/AGAR DIL: CPT | Performed by: PHYSICIAN ASSISTANT

## 2024-10-20 PROCEDURE — 81025 URINE PREGNANCY TEST: CPT | Performed by: EMERGENCY MEDICINE

## 2024-10-20 PROCEDURE — 80053 COMPREHEN METABOLIC PANEL: CPT | Performed by: PHYSICIAN ASSISTANT

## 2024-10-20 RX ORDER — DOCUSATE SODIUM 100 MG
200 CAPSULE ORAL ONCE
Status: COMPLETED | OUTPATIENT
Start: 2024-10-20 | End: 2024-10-20

## 2024-10-20 RX ORDER — DICYCLOMINE HYDROCHLORIDE 10 MG/1
20 CAPSULE ORAL
Status: COMPLETED | OUTPATIENT
Start: 2024-10-20 | End: 2024-10-20

## 2024-10-20 RX ORDER — DICYCLOMINE HYDROCHLORIDE 20 MG/1
20 TABLET ORAL 2 TIMES DAILY
Qty: 20 TABLET | Refills: 0 | Status: SHIPPED | OUTPATIENT
Start: 2024-10-20 | End: 2024-11-19

## 2024-10-20 RX ORDER — ONDANSETRON 4 MG/1
4 TABLET, ORALLY DISINTEGRATING ORAL
Status: COMPLETED | OUTPATIENT
Start: 2024-10-20 | End: 2024-10-20

## 2024-10-20 RX ORDER — ONDANSETRON 4 MG/1
4 TABLET, FILM COATED ORAL EVERY 6 HOURS
Qty: 12 TABLET | Refills: 0 | Status: SHIPPED | OUTPATIENT
Start: 2024-10-20

## 2024-10-20 RX ADMIN — Medication 200 ML: at 09:10

## 2024-10-20 RX ADMIN — ONDANSETRON 4 MG: 4 TABLET, ORALLY DISINTEGRATING ORAL at 09:10

## 2024-10-20 RX ADMIN — DICYCLOMINE HYDROCHLORIDE 20 MG: 10 CAPSULE ORAL at 09:10

## 2024-10-20 NOTE — ED PROVIDER NOTES
Encounter Date: 10/20/2024       History     Chief Complaint   Patient presents with    Emesis      With diarrhea that started after eating seafood/oysters yesterday. Denies abd pain.     Ellis Ramos is a 29 y.o. female who presents to the ED with complaints of nausea, vomiting, and diarrhea that started 1 day(s) ago after she ate oysters and seafood gumbo at a restaurant. She reports her aunt is sick with the same symptoms after eating the same thing as her. She denies abdominal pain, dysuria, hematuria, fever, chills.        The history is provided by the patient. No  was used.     Review of patient's allergies indicates:   Allergen Reactions    Amoxicillin Dermatitis     Other reaction(s): Rash    Penicillin      Past Medical History:   Diagnosis Date    Abnormal Pap smear of cervix      Past Surgical History:   Procedure Laterality Date    CHROMOTUBATION OF FALLOPIAN TUBE Bilateral 4/11/2024    Procedure: CHROMOTUBATION, OVIDUCT;  Surgeon: Yola Hair MD;  Location: [facility];  Service: OB/GYN;  Laterality: Bilateral;    CONIZATION OF CERVIX USING LOOP ELECTROSURGICAL EXCISION PROCEDURE (LEEP) N/A 4/11/2024    Procedure: LEEP CONIZATION, CERVIX;  Surgeon: Yola Hair MD;  Location: [facility];  Service: OB/GYN;  Laterality: N/A;    CYST REMOVAL      On right foot    CYSTOSCOPY N/A 4/11/2024    Procedure: CYSTOSCOPY;  Surgeon: Yola Hair MD;  Location: [facility] OR;  Service: OB/GYN;  Laterality: N/A;    DIAGNOSTIC LAPAROSCOPY N/A 4/11/2024    Procedure: LAPAROSCOPY, DIAGNOSTIC;  Surgeon: Yola Hair MD;  Location: [facility] OR;  Service: OB/GYN;  Laterality: N/A;  2 cameras, 2 light cords  Top & bottom @same time    HYSTEROSCOPY WITH DILATION AND CURETTAGE OF UTERUS N/A 4/11/2024    Procedure: HYSTEROSCOPY, WITH DILATION AND CURETTAGE OF UTERUS;  Surgeon: Yola Hair MD;  Location: [facility];  Service: OB/GYN;  Laterality: N/A;    REPAIR, HERNIA, UMBILICAL N/A 7/26/2024     Procedure: REPAIR, HERNIA, UMBILICAL;  Surgeon: Matthew Pugh Jr., MD;  Location: [facility];  Service: General;  Laterality: N/A;     Family History   Problem Relation Name Age of Onset    Ovarian cancer Paternal Grandmother      Ovarian cancer Maternal Grandmother      Diabetes Maternal Aunt       Social History     Tobacco Use    Smoking status: Never     Passive exposure: Never    Smokeless tobacco: Never   Substance Use Topics    Alcohol use: Not Currently     Comment: Occasionally    Drug use: Yes     Types: Marijuana     Comment: occ     Review of Systems   Constitutional:  Negative for chills, fatigue and fever.   HENT:  Negative for congestion, ear pain, sinus pain and sore throat.    Eyes:  Negative for pain.   Respiratory:  Negative for cough, chest tightness and shortness of breath.    Cardiovascular:  Negative for chest pain.   Gastrointestinal:  Positive for diarrhea, nausea and vomiting. Negative for abdominal pain and constipation.   Genitourinary:  Negative for dysuria.   Musculoskeletal:  Negative for back pain and joint swelling.   Skin:  Negative for color change and rash.   Neurological:  Negative for dizziness and weakness.   Psychiatric/Behavioral:  Negative for behavioral problems and confusion.        Physical Exam     Initial Vitals [10/20/24 0753]   BP Pulse Resp Temp SpO2   133/75 62 18 98.9 °F (37.2 °C) 100 %      MAP       --         Physical Exam    Nursing note and vitals reviewed.  Constitutional: She appears well-developed and well-nourished.   HENT:   Head: Normocephalic and atraumatic.   Nose: Nose normal.   Eyes: EOM are normal. Pupils are equal, round, and reactive to light.   Neck: Neck supple. No thyromegaly present. No JVD present.   Normal range of motion.  Cardiovascular:  Normal rate, regular rhythm, normal heart sounds and intact distal pulses.           No murmur heard.  Pulmonary/Chest: Breath sounds normal. No respiratory distress. She has no wheezes. She has no  rhonchi. She has no rales. She exhibits no tenderness.   Abdominal: Abdomen is soft. Bowel sounds are normal. She exhibits no distension. There is no abdominal tenderness. There is no rebound and no guarding.   Musculoskeletal:         General: No tenderness or edema. Normal range of motion.      Cervical back: Normal range of motion and neck supple.     Lymphadenopathy:     She has no cervical adenopathy.   Neurological: She is alert and oriented to person, place, and time.   Skin: Skin is warm and dry. Capillary refill takes less than 2 seconds.   Psychiatric: She has a normal mood and affect. Thought content normal.         ED Course   Procedures  Labs Reviewed   COMPREHENSIVE METABOLIC PANEL - Abnormal       Result Value    Sodium 140      Potassium 4.2      Chloride 110 (*)     CO2 24      Glucose 90      Blood Urea Nitrogen 6.8 (*)     Creatinine 0.85      Calcium 9.6      Protein Total 7.3      Albumin 4.1      Globulin 3.2      Albumin/Globulin Ratio 1.3      Bilirubin Total 0.5      ALP 59      ALT 13      AST 17      eGFR >60      Anion Gap 6.0      BUN/Creatinine Ratio 8     URINALYSIS, REFLEX TO URINE CULTURE - Abnormal    Color, UA Light-Yellow      Appearance, UA Turbid (*)     Specific Gravity, UA 1.013      pH, UA 6.0      Protein, UA Negative      Glucose, UA Normal      Ketones, UA Negative      Blood, UA Trace (*)     Bilirubin, UA Negative      Urobilinogen, UA Normal      Nitrites, UA 2+ (*)     Leukocyte Esterase,  (*)     RBC, UA 0-5      WBC, UA 21-50 (*)     Bacteria, UA Occ (*)     Squamous Epithelial Cells, UA Trace (*)     Mucous, UA Trace (*)     Hyaline Casts, UA None Seen     CBC WITH DIFFERENTIAL - Abnormal    WBC 6.23      RBC 4.76      Hgb 14.4      Hct 44.5      MCV 93.5      MCH 30.3      MCHC 32.4 (*)     RDW 11.9      Platelet 225      MPV 10.3      Neut % 59.4      Lymph % 25.5      Mono % 13.0      Eos % 1.3      Basophil % 0.3      Lymph # 1.59      Neut # 3.70       Mono # 0.81      Eos # 0.08      Baso # 0.02      IG# 0.03      IG% 0.5      NRBC% 0.0     LIPASE - Normal    Lipase Level 14     CULTURE, URINE   CBC W/ AUTO DIFFERENTIAL    Narrative:     The following orders were created for panel order CBC Auto Differential.  Procedure                               Abnormality         Status                     ---------                               -----------         ------                     CBC with Differential[[id number]]       Abnormal            Final result                 Please view results for these tests on the individual orders.   EXTRA TUBES    Narrative:     The following orders were created for panel order EXTRA TUBES.  Procedure                               Abnormality         Status                     ---------                               -----------         ------                     Light Blue Top Hold[[id number]]                             In process                 Gold Top Hold[[id number]]                                   In process                   Please view results for these tests on the individual orders.   LIGHT BLUE TOP HOLD   GOLD TOP HOLD   POCT URINE PREGNANCY    POC Preg Test, Ur Negative       Acceptable Yes            Imaging Results    None          Medications   electrolytes-dextrose (Pedialyte) oral solution 200 mL (200 mLs Oral Given 10/20/24 0928)   ondansetron disintegrating tablet 4 mg (4 mg Oral Given 10/20/24 0913)   dicyclomine capsule 20 mg (20 mg Oral Given 10/20/24 0927)     Medical Decision Making  DDX: food poisoning, gastroenteritis, nausea with vomiting, among others    Ellis Ramos is a 29 y.o. female who presents to the ED with complaints of nausea, vomiting, and diarrhea that started 1 day(s) ago after she ate oysters and seafood gumbo at a restaurant. She reports her aunt is sick with the same symptoms after eating the same thing as her. She denies abdominal pain, dysuria, hematuria, fever,  chills.     Hospital Course: Labs ordered. No sign of dehydration. No abdominal pain. Patient given Zofran 4 mg and Bentyl 20 mg. Patient given a cup of pedialyte to drink. Tolerated without vomiting. UA consistent with infection. Patient denies all symptoms of UTI. She reports a history of IC and states she doesn't want to take antibiotics until the urine is cultured. Will send urine for culture and call in abx if needed. Will DC home with Zofran and Bentyl. Strict return precautions given. Stable for discharge.     Amount and/or Complexity of Data Reviewed  Labs: ordered.    Risk  OTC drugs.  Prescription drug management.                                      Clinical Impression:  Final diagnoses:  [R11.2] Nausea and vomiting, unspecified vomiting type (Primary)  [A05.9] Food poisoning          ED Disposition Condition    Discharge Stable          ED Prescriptions       Medication Sig Dispense Start Date End Date Auth. Provider    ondansetron (ZOFRAN) 4 MG tablet Take 1 tablet (4 mg total) by mouth every 6 (six) hours. 12 tablet 10/20/2024 -- Anna Luciano PA-C    dicyclomine (BENTYL) 20 mg tablet Take 1 tablet (20 mg total) by mouth 2 (two) times daily. 20 tablet 10/20/2024 11/19/2024 Anna Luciano PA-C          Follow-up Information       Follow up With Specialties Details Why Contact Info    Lida Hoyos NP [facility] Emergency Medicine   [address] [place] [id number] [phone]      [facility] - Emergency Dept Emergency Medicine In 3 days As needed, If symptoms worsen [address] [facility] [address] [phone]             Anna Luciano PA-C  10/20/24 9447

## 2024-10-20 NOTE — Clinical Note
"Ellis Villafana" Richard was seen and treated in our emergency department on 10/20/2024.  She may return to work on 10/22/2024.       If you have any questions or concerns, please don't hesitate to call.      Anna Luciano PA-C"

## 2024-10-22 ENCOUNTER — TELEPHONE (OUTPATIENT)
Dept: EMERGENCY MEDICINE | Facility: HOSPITAL | Age: 29
End: 2024-10-22
Payer: COMMERCIAL

## 2024-10-22 LAB — BACTERIA UR CULT: ABNORMAL

## 2024-10-22 RX ORDER — NITROFURANTOIN 25; 75 MG/1; MG/1
100 CAPSULE ORAL 2 TIMES DAILY
Qty: 14 CAPSULE | Refills: 0 | Status: SHIPPED | OUTPATIENT
Start: 2024-10-22 | End: 2024-10-29

## 2024-10-31 ENCOUNTER — CLINICAL SUPPORT (OUTPATIENT)
Dept: GYNECOLOGY | Facility: CLINIC | Age: 29
End: 2024-10-31
Payer: COMMERCIAL

## 2024-10-31 DIAGNOSIS — Z23 NEED FOR HPV VACCINE: Primary | ICD-10-CM

## 2024-10-31 PROCEDURE — 90651 9VHPV VACCINE 2/3 DOSE IM: CPT | Mod: PBBFAC

## 2024-10-31 PROCEDURE — 99211 OFF/OP EST MAY X REQ PHY/QHP: CPT | Mod: PBBFAC,25

## 2024-10-31 PROCEDURE — 90471 IMMUNIZATION ADMIN: CPT | Mod: PBBFAC

## 2024-10-31 RX ADMIN — HUMAN PAPILLOMAVIRUS 9-VALENT VACCINE, RECOMBINANT 0.5 ML: 30; 40; 60; 40; 20; 20; 20; 20; 20 INJECTION, SUSPENSION INTRAMUSCULAR at 09:10

## 2024-11-01 ENCOUNTER — PROCEDURE VISIT (OUTPATIENT)
Dept: GYNECOLOGY | Facility: CLINIC | Age: 29
End: 2024-11-01
Payer: COMMERCIAL

## 2024-11-01 VITALS
RESPIRATION RATE: 18 BRPM | HEIGHT: 67 IN | WEIGHT: 166.38 LBS | BODY MASS INDEX: 26.11 KG/M2 | OXYGEN SATURATION: 100 % | TEMPERATURE: 99 F | DIASTOLIC BLOOD PRESSURE: 84 MMHG | SYSTOLIC BLOOD PRESSURE: 121 MMHG | HEART RATE: 60 BPM

## 2024-11-01 DIAGNOSIS — N87.9 CERVICAL DYSPLASIA: Primary | ICD-10-CM

## 2024-11-01 LAB
B-HCG UR QL: NEGATIVE
CTP QC/QA: YES

## 2024-11-01 RX ORDER — ACETAMINOPHEN 500 MG
1000 TABLET ORAL
Status: COMPLETED | OUTPATIENT
Start: 2024-11-01 | End: 2024-11-01

## 2024-11-01 RX ADMIN — Medication 1000 MG: at 12:11

## 2024-11-17 ENCOUNTER — HOSPITAL ENCOUNTER (EMERGENCY)
Facility: HOSPITAL | Age: 29
Discharge: HOME | End: 2024-11-17
Attending: EMERGENCY MEDICINE
Payer: COMMERCIAL

## 2024-11-17 VITALS
SYSTOLIC BLOOD PRESSURE: 110 MMHG | HEART RATE: 74 BPM | BODY MASS INDEX: 27.32 KG/M2 | RESPIRATION RATE: 16 BRPM | WEIGHT: 170 LBS | DIASTOLIC BLOOD PRESSURE: 76 MMHG | HEIGHT: 66 IN | TEMPERATURE: 98 F | OXYGEN SATURATION: 100 %

## 2024-11-17 DIAGNOSIS — R11.2 NAUSEA AND VOMITING, UNSPECIFIED VOMITING TYPE: Primary | ICD-10-CM

## 2024-11-17 LAB
ALBUMIN SERPL-MCNC: 3.9 G/DL (ref 3.5–5)
ALBUMIN/GLOB SERPL: 1.3 RATIO (ref 1.1–2)
ALP SERPL-CCNC: 56 UNIT/L (ref 40–150)
ALT SERPL-CCNC: 12 UNIT/L (ref 0–55)
ANION GAP SERPL CALC-SCNC: 6 MEQ/L
AST SERPL-CCNC: 12 UNIT/L (ref 5–34)
B-HCG UR QL: NEGATIVE
BACTERIA #/AREA URNS AUTO: ABNORMAL /HPF
BASOPHILS # BLD AUTO: 0.04 X10(3)/MCL
BASOPHILS NFR BLD AUTO: 0.6 %
BILIRUB SERPL-MCNC: 0.4 MG/DL
BILIRUB UR QL STRIP.AUTO: NEGATIVE
BUN SERPL-MCNC: 8.8 MG/DL (ref 7–18.7)
CALCIUM SERPL-MCNC: 9.5 MG/DL (ref 8.4–10.2)
CHLORIDE SERPL-SCNC: 110 MMOL/L (ref 98–107)
CLARITY UR: CLEAR
CO2 SERPL-SCNC: 24 MMOL/L (ref 22–29)
COLOR UR AUTO: ABNORMAL
CREAT SERPL-MCNC: 0.85 MG/DL (ref 0.55–1.02)
CREAT/UREA NIT SERPL: 10
CTP QC/QA: YES
EOSINOPHIL # BLD AUTO: 0.08 X10(3)/MCL (ref 0–0.9)
EOSINOPHIL NFR BLD AUTO: 1.1 %
ERYTHROCYTE [DISTWIDTH] IN BLOOD BY AUTOMATED COUNT: 11.8 % (ref 11.5–17)
GFR SERPLBLD CREATININE-BSD FMLA CKD-EPI: >60 ML/MIN/1.73/M2
GLOBULIN SER-MCNC: 3 GM/DL (ref 2.4–3.5)
GLUCOSE SERPL-MCNC: 90 MG/DL (ref 74–100)
GLUCOSE UR QL STRIP: NORMAL
HCT VFR BLD AUTO: 42.9 % (ref 37–47)
HGB BLD-MCNC: 14.5 G/DL (ref 12–16)
HGB UR QL STRIP: NEGATIVE
HOLD SPECIMEN: NORMAL
HYALINE CASTS #/AREA URNS LPF: ABNORMAL /LPF
IMM GRANULOCYTES # BLD AUTO: 0.02 X10(3)/MCL (ref 0–0.04)
IMM GRANULOCYTES NFR BLD AUTO: 0.3 %
KETONES UR QL STRIP: NEGATIVE
LEUKOCYTE ESTERASE UR QL STRIP: NEGATIVE
LYMPHOCYTES # BLD AUTO: 1.97 X10(3)/MCL (ref 0.6–4.6)
LYMPHOCYTES NFR BLD AUTO: 27.7 %
MCH RBC QN AUTO: 31.7 PG (ref 27–31)
MCHC RBC AUTO-ENTMCNC: 33.8 G/DL (ref 33–36)
MCV RBC AUTO: 93.9 FL (ref 80–94)
MONOCYTES # BLD AUTO: 0.89 X10(3)/MCL (ref 0.1–1.3)
MONOCYTES NFR BLD AUTO: 12.5 %
MUCOUS THREADS URNS QL MICRO: ABNORMAL /LPF
NEUTROPHILS # BLD AUTO: 4.1 X10(3)/MCL (ref 2.1–9.2)
NEUTROPHILS NFR BLD AUTO: 57.8 %
NITRITE UR QL STRIP: NEGATIVE
NRBC BLD AUTO-RTO: 0 %
PH UR STRIP: 7 [PH]
PLATELET # BLD AUTO: 207 X10(3)/MCL (ref 130–400)
PMV BLD AUTO: 10.2 FL (ref 7.4–10.4)
POTASSIUM SERPL-SCNC: 4 MMOL/L (ref 3.5–5.1)
PROT SERPL-MCNC: 6.9 GM/DL (ref 6.4–8.3)
PROT UR QL STRIP: NEGATIVE
RBC # BLD AUTO: 4.57 X10(6)/MCL (ref 4.2–5.4)
RBC #/AREA URNS AUTO: ABNORMAL /HPF
SODIUM SERPL-SCNC: 140 MMOL/L (ref 136–145)
SP GR UR STRIP.AUTO: 1.02 (ref 1–1.03)
SQUAMOUS #/AREA URNS LPF: ABNORMAL /HPF
UROBILINOGEN UR STRIP-ACNC: NORMAL
WBC # BLD AUTO: 7.1 X10(3)/MCL (ref 4.5–11.5)
WBC #/AREA URNS AUTO: ABNORMAL /HPF

## 2024-11-17 PROCEDURE — 96374 THER/PROPH/DIAG INJ IV PUSH: CPT

## 2024-11-17 PROCEDURE — 99284 EMERGENCY DEPT VISIT MOD MDM: CPT | Mod: 25

## 2024-11-17 PROCEDURE — 36415 COLL VENOUS BLD VENIPUNCTURE: CPT | Performed by: EMERGENCY MEDICINE

## 2024-11-17 PROCEDURE — 85025 COMPLETE CBC W/AUTO DIFF WBC: CPT | Performed by: EMERGENCY MEDICINE

## 2024-11-17 PROCEDURE — 81001 URINALYSIS AUTO W/SCOPE: CPT | Performed by: EMERGENCY MEDICINE

## 2024-11-17 PROCEDURE — 63600175 PHARM REV CODE 636 W HCPCS: Performed by: EMERGENCY MEDICINE

## 2024-11-17 PROCEDURE — 80053 COMPREHEN METABOLIC PANEL: CPT | Performed by: EMERGENCY MEDICINE

## 2024-11-17 PROCEDURE — 81025 URINE PREGNANCY TEST: CPT | Performed by: EMERGENCY MEDICINE

## 2024-11-17 PROCEDURE — 87086 URINE CULTURE/COLONY COUNT: CPT | Performed by: EMERGENCY MEDICINE

## 2024-11-17 RX ORDER — ONDANSETRON 4 MG/1
4 TABLET, ORALLY DISINTEGRATING ORAL EVERY 12 HOURS PRN
Qty: 16 TABLET | Refills: 2 | Status: SHIPPED | OUTPATIENT
Start: 2024-11-17 | End: 2024-11-21

## 2024-11-17 RX ORDER — ONDANSETRON HYDROCHLORIDE 2 MG/ML
4 INJECTION, SOLUTION INTRAVENOUS
Status: COMPLETED | OUTPATIENT
Start: 2024-11-17 | End: 2024-11-17

## 2024-11-17 RX ADMIN — ONDANSETRON 4 MG: 2 INJECTION INTRAMUSCULAR; INTRAVENOUS at 08:11

## 2024-11-17 NOTE — Clinical Note
"Ellis Villafana" Richard was seen and treated in our emergency department on 11/17/2024.  She may return to work on 11/18/2024.       If you have any questions or concerns, please don't hesitate to call.      Daryn Hodge MD"

## 2024-11-17 NOTE — DISCHARGE INSTRUCTIONS
As discussed, keep your appointment as scheduled with primary care and consider following up with a gastroenterologist if you have recurring problems of nausea and vomiting.    Labs are fine.    Use Zofran as labeled/ as needed.

## 2024-11-17 NOTE — ED PROVIDER NOTES
Encounter Date: 11/17/2024       History     Chief Complaint   Patient presents with    Nausea     Patient reports nausea and vomiting since yesterday. Vomited twice this morning. Denies pain. Denies diarrhea.      Nausea and vomiting onset yesterday evening, 2 or 3 episodes yesterday and this morning, not associated with fever, abdominal pain, diarrhea, bleeding, urinary symptoms, back pain, or other identified symptoms.  She tried a sublingual Zofran once last night but threw it up immediately.  Denies any chance of pregnancy.  No known ill contacts or other suspected exposures that may have caused current symptoms.  No other complaints.    The history is provided by the patient. No  was used.     Review of patient's allergies indicates:   Allergen Reactions    Amoxicillin Dermatitis     Other reaction(s): Rash    Penicillin      Past Medical History:   Diagnosis Date    Abnormal Pap smear of cervix      Past Surgical History:   Procedure Laterality Date    CHROMOTUBATION OF FALLOPIAN TUBE Bilateral 4/11/2024    Procedure: CHROMOTUBATION, OVIDUCT;  Surgeon: Yola Hair MD;  Location: [facility];  Service: OB/GYN;  Laterality: Bilateral;    CONIZATION OF CERVIX USING LOOP ELECTROSURGICAL EXCISION PROCEDURE (LEEP) N/A 4/11/2024    Procedure: LEEP CONIZATION, CERVIX;  Surgeon: Yola Hair MD;  Location: [facility] OR;  Service: OB/GYN;  Laterality: N/A;    CYST REMOVAL      On right foot    CYSTOSCOPY N/A 4/11/2024    Procedure: CYSTOSCOPY;  Surgeon: Yola Hair MD;  Location: [facility] OR;  Service: OB/GYN;  Laterality: N/A;    DIAGNOSTIC LAPAROSCOPY N/A 4/11/2024    Procedure: LAPAROSCOPY, DIAGNOSTIC;  Surgeon: Yola Hair MD;  Location: [facility];  Service: OB/GYN;  Laterality: N/A;  2 cameras, 2 light cords  Top & bottom @same time    HYSTEROSCOPY WITH DILATION AND CURETTAGE OF UTERUS N/A 4/11/2024    Procedure: HYSTEROSCOPY, WITH DILATION AND CURETTAGE OF UTERUS;  Surgeon: Laxmi  MD Yola;  Location: [facility];  Service: OB/GYN;  Laterality: N/A;    REPAIR, HERNIA, UMBILICAL N/A 7/26/2024    Procedure: REPAIR, HERNIA, UMBILICAL;  Surgeon: Matthew Pugh Jr., MD;  Location: [facility];  Service: General;  Laterality: N/A;     Family History   Problem Relation Name Age of Onset    Ovarian cancer Paternal Grandmother      Ovarian cancer Maternal Grandmother      Diabetes Maternal Aunt       Social History     Tobacco Use    Smoking status: Never     Passive exposure: Never    Smokeless tobacco: Never   Substance Use Topics    Alcohol use: Not Currently     Comment: Occasionally    Drug use: Yes     Types: Marijuana     Comment: occ     Review of Systems   Gastrointestinal:  Positive for nausea and vomiting.       Physical Exam     Initial Vitals [11/17/24 0721]   BP Pulse Resp Temp SpO2   108/75 72 17 97 °F (36.1 °C) 100 %      MAP       --         Physical Exam    Nursing note and vitals reviewed.  Constitutional: She appears well-developed and well-nourished. No distress.   HENT:   Head: Normocephalic and atraumatic.   Eyes: EOM are normal. Pupils are equal, round, and reactive to light.   Neck: Neck supple.   Normal range of motion.  Cardiovascular:  Normal rate and regular rhythm.           Pulmonary/Chest: Breath sounds normal. No respiratory distress.   Abdominal: Abdomen is soft. Bowel sounds are normal. She exhibits no distension. There is no abdominal tenderness.   Musculoskeletal:      Cervical back: Normal range of motion and neck supple.     Neurological: She is alert and oriented to person, place, and time. She has normal strength.   Skin: Skin is warm and dry.   Psychiatric: She has a normal mood and affect.         ED Course   Procedures  Labs Reviewed   COMPREHENSIVE METABOLIC PANEL - Abnormal       Result Value    Sodium 140      Potassium 4.0      Chloride 110 (*)     CO2 24      Glucose 90      Blood Urea Nitrogen 8.8      Creatinine 0.85      Calcium 9.5      Protein  Total 6.9      Albumin 3.9      Globulin 3.0      Albumin/Globulin Ratio 1.3      Bilirubin Total 0.4      ALP 56      ALT 12      AST 12      eGFR >60      Anion Gap 6.0      BUN/Creatinine Ratio 10     URINALYSIS, REFLEX TO URINE CULTURE - Abnormal    Color, UA Light-Yellow      Appearance, UA Clear      Specific Gravity, UA 1.021      pH, UA 7.0      Protein, UA Negative      Glucose, UA Normal      Ketones, UA Negative      Blood, UA Negative      Bilirubin, UA Negative      Urobilinogen, UA Normal      Nitrites, UA Negative      Leukocyte Esterase, UA Negative      RBC, UA 0-5      WBC, UA 11-20 (*)     Bacteria, UA Many (*)     Squamous Epithelial Cells, UA Occasional (*)     Mucous, UA Trace (*)     Hyaline Casts, UA None Seen     CBC WITH DIFFERENTIAL - Abnormal    WBC 7.10      RBC 4.57      Hgb 14.5      Hct 42.9      MCV 93.9      MCH 31.7 (*)     MCHC 33.8      RDW 11.8      Platelet 207      MPV 10.2      Neut % 57.8      Lymph % 27.7      Mono % 12.5      Eos % 1.1      Basophil % 0.6      Lymph # 1.97      Neut # 4.10      Mono # 0.89      Eos # 0.08      Baso # 0.04      IG# 0.02      IG% 0.3      NRBC% 0.0     CULTURE, URINE   CBC W/ AUTO DIFFERENTIAL    Narrative:     The following orders were created for panel order CBC auto differential.  Procedure                               Abnormality         Status                     ---------                               -----------         ------                     CBC with Differential[[id number]]       Abnormal            Final result                 Please view results for these tests on the individual orders.   EXTRA TUBES    Narrative:     The following orders were created for panel order EXTRA TUBES.  Procedure                               Abnormality         Status                     ---------                               -----------         ------                     Light Blue Top Hold[[id number]]                             In process                  Gold Top Hold[[id number]]                                   In process                 Gold Top Hold[[id number]]                                   In process                 Pink Top Hold[[id number]]                                   In process                   Please view results for these tests on the individual orders.   LIGHT BLUE TOP HOLD   GOLD TOP HOLD   GOLD TOP HOLD   PINK TOP HOLD   POCT URINE PREGNANCY          Imaging Results    None          Medications   ondansetron injection 4 mg (4 mg Intravenous Given 11/17/24 0812)     9:21 AM Feeling much better.  She now relates that she is having some recurring episodes of nausea and vomiting on an ongoing intermittent basis and does have follow-up scheduled with primary care soon.  She is going to need a note for work today.  Counseled, reassured, no other complaints, ready for discharge.  Refilling Zofran.  Counseled that she may need GI follow-up if she feels she is having ongoing recurring GI symptoms, discharge with the following instructions:      As discussed, keep your appointment as scheduled with primary care and consider following up with a gastroenterologist if you have recurring problems of nausea and vomiting.    Labs are fine.    Use Zofran as labeled/ as needed.          Medical Decision Making  Intermittent relatively mild nausea and vomiting, ongoing problem with current exacerbation, needing a note for work.  Not showing signs of dehydration or infection.  Symptoms reasonably well controlled with Zofran.  Symptomatic management and primary care follow-up.     Problems Addressed:  Nausea and vomiting, unspecified vomiting type: chronic illness or injury with exacerbation, progression, or side effects of treatment    Amount and/or Complexity of Data Reviewed  Labs: ordered. Decision-making details documented in ED Course.    Risk  Prescription drug management.      Additional MDM:   Differential Diagnosis:   Gastroenteritis, peptic  ulcer disease, cholecystitis, other causes of nausea and vomiting among many other                                    Clinical Impression:  Final diagnoses:  [R11.2] Nausea and vomiting, unspecified vomiting type (Primary)          ED Disposition Condition    Discharge Stable          ED Prescriptions       Medication Sig Dispense Start Date End Date Auth. Provider    ondansetron (ZOFRAN-ODT) 4 MG TbDL Take 1 tablet (4 mg total) by mouth every 12 (twelve) hours as needed (n/v). 16 tablet 11/17/2024 -- Daryn Hodge MD          Follow-up Information       Follow up With Specialties Details Why Contact Info    [facility] Dept Emergency Medicine  As needed [address] [facility] [address] [phone]             Daryn Hodge MD  11/17/24 8978

## 2024-11-18 ENCOUNTER — TELEPHONE (OUTPATIENT)
Dept: GYNECOLOGY | Facility: CLINIC | Age: 29
End: 2024-11-18
Payer: COMMERCIAL

## 2024-11-19 ENCOUNTER — TELEPHONE (OUTPATIENT)
Dept: GYNECOLOGY | Facility: CLINIC | Age: 29
End: 2024-11-19
Payer: COMMERCIAL

## 2024-11-19 LAB — BACTERIA UR CULT: ABNORMAL

## 2024-11-19 NOTE — TELEPHONE ENCOUNTER
Appt made for Dec 16th, patient aware ----- Message from Sonali Perez sent at 11/18/2024  5:09 PM CST -----  We can have her come back in  1 month. Thanks.  ----- Message -----  From: Yuly Hayden RN  Sent: 11/14/2024   3:11 PM CST  To: [facility] Residents    Would someone be able to look at this patient's chart and let us know when to bring her in?  ----- Message -----  From: Jennyfer Godfrey  Sent: 11/14/2024   2:56 PM CST  To: [facility] Clinical Support Staff    Patient requesting to have another trigger point shot also a call back from the provider.

## 2024-11-19 NOTE — PROGRESS NOTES
Discussed with provider EVERETT Richter orders received for Cipro 500 mg 1 po bid for 3 days no refills.  Patient contacted informed of results and antibiotic, request script be called in at [facility] # [id number]

## 2024-11-21 ENCOUNTER — TELEPHONE (OUTPATIENT)
Dept: GYNECOLOGY | Facility: CLINIC | Age: 29
End: 2024-11-21

## 2024-11-21 ENCOUNTER — OFFICE VISIT (OUTPATIENT)
Dept: URGENT CARE | Facility: CLINIC | Age: 29
End: 2024-11-21
Payer: COMMERCIAL

## 2024-11-21 ENCOUNTER — CLINICAL SUPPORT (OUTPATIENT)
Dept: GYNECOLOGY | Facility: CLINIC | Age: 29
End: 2024-11-21
Payer: COMMERCIAL

## 2024-11-21 VITALS
RESPIRATION RATE: 16 BRPM | WEIGHT: 169.13 LBS | HEART RATE: 68 BPM | OXYGEN SATURATION: 100 % | HEIGHT: 66 IN | DIASTOLIC BLOOD PRESSURE: 81 MMHG | BODY MASS INDEX: 27.18 KG/M2 | TEMPERATURE: 99 F | SYSTOLIC BLOOD PRESSURE: 117 MMHG

## 2024-11-21 DIAGNOSIS — J02.9 SORE THROAT: ICD-10-CM

## 2024-11-21 DIAGNOSIS — N93.9 ABNORMAL UTERINE BLEEDING: Primary | ICD-10-CM

## 2024-11-21 DIAGNOSIS — R53.83 FATIGUE, UNSPECIFIED TYPE: Primary | ICD-10-CM

## 2024-11-21 LAB
CTP QC/QA: YES
MOLECULAR STREP A: NEGATIVE
POC MOLECULAR INFLUENZA A AGN: NEGATIVE
POC MOLECULAR INFLUENZA B AGN: NEGATIVE
SARS-COV-2 AG RESP QL IA.RAPID: NEGATIVE

## 2024-11-21 PROCEDURE — 99211 OFF/OP EST MAY X REQ PHY/QHP: CPT | Mod: PBBFAC

## 2024-11-21 PROCEDURE — 87502 INFLUENZA DNA AMP PROBE: CPT | Mod: PBBFAC | Performed by: FAMILY MEDICINE

## 2024-11-21 PROCEDURE — 87811 SARS-COV-2 COVID19 W/OPTIC: CPT | Mod: PBBFAC | Performed by: FAMILY MEDICINE

## 2024-11-21 PROCEDURE — 99214 OFFICE O/P EST MOD 30 MIN: CPT | Mod: PBBFAC,27 | Performed by: FAMILY MEDICINE

## 2024-11-21 PROCEDURE — 96372 THER/PROPH/DIAG INJ SC/IM: CPT | Mod: PBBFAC

## 2024-11-21 PROCEDURE — 87651 STREP A DNA AMP PROBE: CPT | Mod: PBBFAC | Performed by: FAMILY MEDICINE

## 2024-11-21 RX ORDER — ONDANSETRON 8 MG/1
8 TABLET, ORALLY DISINTEGRATING ORAL EVERY 8 HOURS PRN
Qty: 10 TABLET | Refills: 0 | Status: SHIPPED | OUTPATIENT
Start: 2024-11-21

## 2024-11-21 RX ORDER — MEDROXYPROGESTERONE ACETATE 150 MG/ML
150 INJECTION, SUSPENSION INTRAMUSCULAR
Status: COMPLETED | OUTPATIENT
Start: 2024-11-21 | End: 2024-11-21

## 2024-11-21 RX ORDER — CIPROFLOXACIN 500 MG/1
500 TABLET ORAL 2 TIMES DAILY
COMMUNITY
Start: 2024-11-19

## 2024-11-21 RX ADMIN — MEDROXYPROGESTERONE ACETATE 150 MG: 150 INJECTION, SUSPENSION INTRAMUSCULAR at 09:11

## 2024-11-21 NOTE — TELEPHONE ENCOUNTER
Called patient and let her know that I added the breast appt to her trigger point appt also that she doesn't need a referral to see another gyn ----- Message from Sonali Perez sent at 11/21/2024  2:40 PM CST -----  Hello,     She can make an appt with another OBGYN service without our referral. We can see her next available  for breast complaint in-person in order to do physical exam. Thanks!      Sonali Perez MD   PGY2, Obstetrics & Gynecology   [facility]  ----- Message -----  From: Yuly Hayden RN  Sent: 11/21/2024   9:06 AM CST  To: [facility]    Patient came in today for depo but was asking for a referral to Dr Tinoco for  a second opinion, not sure of what.  And she wants to do a mammo because she is feeling tingling in her right breast and nipple.  Would you like us to set up a virtual visit so that yall can discuss.?   [facility]  Progress Note    Issa Nelson  MRN: [id number]  Date: 6/21/2020    Reason for F/U :   Diabetic foot infection    Impression :   1. 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 Peripheral arterial disease  · Status post arteriogram with stenting 6/19/2020  3. Left plantar foot infected diabetic ulceration  · Status post debridement 6/20/2020  4. Acute kidney injury on chronic kidney disease stage III  5. Chronic dermatitis    Recommendations:   · Continue intravenous antimicrobial therapy with cefepime and vancomycin. · The patient has undergone revascularization surgery. · The patient underwent surgical debridement of the plantar ulceration in the operating room today and the wound extended to the fascial layer. The patient underwent surgical bed preparation with application of a wound VAC to the left heel. · We will follow the culture data from the operating room and decide on antimicrobial therapy at that time    Infection Control Recommendations:   Contact precautions    Discharge Planning:   Estimated Length of IV antimicrobials: To be determined  Patient will need Midline Catheter Insertion/ PICC line Insertion: No  Patient will need: Home IV , [place],  SNF,  LTAC: Undetermined  Patient willneed outpatient wound care: No    Medical Decision making / Summary of Stay:   Issa Nelson is a 80y.o.-year-old male who was initially admitted on 6/17/2020. Adah Maker has a history of diabetes mellitus type 2 with multiple complications including peripheral neuropathy, chronic kidney disease, and peripheral arterial disease. The patient has had a previous history of a right below the knee amputation and a left transmetatarsal amputation. The patient is a poor historian and cannot really tell me how long he has had the ulceration on the plantar aspect of his left heel.   He thinks he has had it for about 2 weeks and reports that there is a visiting nurse who does take care of him at home. The wound has been progressively getting worse over the past 2 weeks and he recalls that 2 weeks ago he had some fevers and chills but none recently. Due to the progressively worsening wound the patient was brought into the emergency room for evaluation. The patient was noted to have a full-thickness ulceration on the plantar aspect of the left heel with a fibronecrotic wound base difficult to stage. The patient was seen by the podiatry service and has since been admitted for a diabetic foot ulcer and I been asked to evaluate and help with antibiotic choice. He was seen by vascular surgery today and taken to the operating room underwent an ultrasound-guided access of the right common femoral artery with aortogram and left tibial angiogram.  The patient underwent angioplasty of the left anterior tibial artery on 2020    Current evaluation:2020    BP (!) 158/60   Pulse 67   Temp 97.7 °F (36.5 °C) (Oral)   Resp 18   Ht 6' (1.829 m)   Wt 253 lb 4.8 oz (114.9 kg)   SpO2 96%   BMI 34.35 kg/m²     Temperature Range: Temp: 97.7 °F (36.5 °C) Temp  Av.6 °F (36.4 °C)  Min: 96.8 °F (36 °C)  Max: 98.5 °F (36.9 °C)  The patient is seen and evaluated at bedside he is awake and alert in no acute distress  He was taken to the operating room and underwent debridement of the plantar ulceration which went down to the fascial layers and there was no evidence of tracking to the calcaneal bone. Patient does not report any new issues or concerns. He denies any fevers, chills, coughing or shortness of breath. No abdominal pain nausea vomiting or diarrhea. Review of Systems   Constitutional: Negative. Respiratory: Negative. Cardiovascular: Negative. Gastrointestinal: Negative. Genitourinary: Negative. Musculoskeletal: Negative. Skin: Positive for wound. Allergic/Immunologic: Negative. Neurological: Negative. Psychiatric/Behavioral: Negative. Physical Examination :     BP (!) 158/60   Pulse 67   Temp 97.7 °F (36.5 °C) (Oral)   Resp 18   Ht 6' (1.829 m)   Wt 253 lb 4.8 oz (114.9 kg)   SpO2 96%   BMI 34.35 kg/m²     Temperature Range: Temp: 97.7 °F (36.5 °C) Temp  Av.6 °F (36.4 °C)  Min: 96.8 °F (36 °C)  Max: 98.5 °F (36.9 °C)      \"[x]\" Indicates a positive item  \"[]\" Indicates a negative item      Constitutional: [x] Appears well-developed and well-nourished [x] No apparent distress      [] Abnormal-     Mental status  [x] Alert and awake  [x] Oriented to person/place/time [x]Able to follow commands      Eyes:  EOM    [x]  Normal  [] Abnormal-  Sclera  [x]  Normal  [] Abnormal -         Discharge [x]  None visible  [] Abnormal -    HENT:   [x] Normocephalic, atraumatic.   [] Abnormal   [x] Mouth/Throat: Mucous membranes are moist.     External Ears [x] Normal  [] Abnormal-     Neck: [x] No visualized mass     Pulmonary/Chest: [x] Respiratory effort normal.  [x] No visualized signs of difficulty breathing or respiratory distress        [] Abnormal-      Musculoskeletal:   [x] Normal gait with no signs of ataxia         [x] Normal range of motion of neck        [] Abnormal-       Neurological:        [x] No Facial Asymmetry (Cranial nerve 7 motor function) (limited exam to video visit)          [x] No gaze palsy        [] Abnormal-         Skin:        [x] No significant exanthematous lesions or discoloration noted on facial skin         [x] Abnormal-the patient has a wound VAC attached to the left foot  Patient does have dermatitis in the left lower extremity           Psychiatric:       [x] Normal Affect [x] No Hallucinations        [] Abnormal-     Other pertinent observable physical exam findings-     Laboratory data:   I have independently reviewed the followinglabs:  CBC with Differential:   Recent Labs     20  0518   WBC 6.3   HGB 11.6*   HCT 36.1*        BMP:   Recent Labs 06/19/20  0518 06/20/20  0449 06/21/20  0701    142 144   K 4.2 3.8 4.0   * 111* 113*   CO2 23 24 21   BUN 19 19 14   CREATININE 1.94* 2.09* 1.85*   MG 1.9  --   --      Hepatic Function Panel: No results for input(s): PROT, LABALBU, BILIDIR, IBILI, BILITOT, ALKPHOS, ALT, AST in the last 72 hours. Recent Labs     06/21/20  0701   VANCOTROUGH 17.9      Lab Results   Component Value Date    CRP 33.1 (H) 06/17/2020     Lab Results   Component Value Date    SEDRATE 128 (H) 06/17/2020       No results for input(s): PROCAL in the last 72 hours. Imaging Studies:   Lower Arterial Plethysmography, PVR Lower with PPG0 6/18/2020 06:04 PM  Summary        Abnormal PVR study at rest for the left lower extremity ,with evidence of    tibial arterial occlusive disease and calcinosis. ABIs consistent with    ischemia. History of TM amputation.    History of right AK amputation. Cultures:     Culture, Blood 1 [[id number]] Collected: 06/17/20 2219   Order Status: Completed Specimen: Blood Updated: 06/21/20 0647    Specimen Description . BLOOD    Special Requests 4 lhand    Culture NO GROWTH 3 DAYS   Culture, Blood 2 [[id number]] Collected: 06/17/20 2221   Order Status: Completed Specimen: Blood Updated: 06/21/20 0647    Specimen Description . BLOOD    Special Requests RT AC 12ML    Culture NO GROWTH 3 DAYS   Wound Culture [[id number]] (Abnormal) Collected: 06/17/20 0400   Order Status: Completed Specimen: No Site Given from Foot Updated: 06/20/20 0405    Specimen Description . FOOT SWAB    Special Requests NOT REPORTED    Direct Exam NO NEUTROPHILS SEEN     MODERATE MIXED BACTERIAL MORPHOTYPES SEEN ON GRAM STAIN. Abnormal     Culture NON LACTOSE FERMENTING GRAM NEGATIVE RODS LIGHT GROWTHAbnormal      NORMAL SKIN FLORAAbnormal        Medications:      insulin glargine  40 Units Subcutaneous Nightly    vancomycin  1,500 mg Intravenous Q24H    clopidogrel  75 mg Oral Daily    insulin lispro  0-12 Units Subcutaneous TID WC    insulin lispro  0-6 Units Subcutaneous Nightly    cefepime  1 g Intravenous Q12H    atorvastatin  10 mg Oral Daily    [Held by provider] insulin glargine  40 Units Subcutaneous QAM    budesonide-formoterol  2 puff Inhalation BID    metoprolol succinate  25 mg Oral BID    amLODIPine  10 mg Oral Daily    docusate sodium  100 mg Oral BID    ferrous sulfate  325 mg Oral Daily with breakfast    [Held by provider] furosemide  40 mg Oral BID    lisinopril  20 mg Oral Daily    traZODone  150 mg Oral Nightly    sodium chloride flush  10 mL Intravenous 2 times per day    collagenase   Topical Daily    metroNIDAZOLE  500 mg Intravenous Q8H    vancomycin (VANCOCIN) intermittent dosing (placeholder)   Other RX Placeholder    heparin (porcine)  5,000 Units Subcutaneous 3 times per day           [facility]  Ciro Nur MD  Perfect [employer] messaging  OFFICE: [phone]      Electronically signed by Ciro Nur MD on 6/21/2020 at 8:17 AM  Thank you for allowing us to participate in the care of this patient. Please call with questions. Smiley Monroe is a 80 y.o. male being evaluated by a Virtual Visit (video visit) encounter to address concerns as mentioned above. A caregiver was present when appropriate. Due to this being a TeleHealth encounter (During [address] emergency), evaluation of the following organ systems was limited: Vitals/Constitutional/EENT/Resp/CV/GI//MS/Neuro/Skin/Heme-Lymph-Imm. Pursuant to the emergency declaration under the [facility], [address] [place] [address] and the [employer] and [employer] Act, this Virtual Visit was conducted with patient's (and/or legal guardian's) consent, to reduce the patient's risk of exposure to COVID-19 and provide necessary medical care.      Services were provided through a video synchronous discussion virtually to substitute for in-person inpatient visit. An electronic signature was used to authenticate this note. This note iscreated with the assistance of a speech recognition program.  While intending to generate a document that actually reflects the content of the visit, the document can still have some errors including those of syntax andsound a like substitutions which may escape proof reading. In such instances, actual meaning can be extrapolated by contextual diversion.

## 2024-11-21 NOTE — LETTER
November 21, 2024      [facility]  [facility] [address]  Phone: [phone]       Patient: Ellis Ramos   YOB: 1995  Date of Visit: 11/21/2024    To Whom It May Concern:    Rafaela Ramos  was at [facility] on 11/21/2024. The patient may return to work/school on 11/23/2024 with no restrictions. If you have any questions or concerns, or if I can be of further assistance, please do not hesitate to contact me.    Sincerely,    LARA Alvarez MD

## 2024-11-21 NOTE — PROGRESS NOTES
"Subjective:       Patient ID: Ellis Ramos is a 29 y.o. female.    Vitals:  height is 5' 6" (1.676 m) and weight is 76.7 kg (169 lb 1.9 oz). Her temperature is 98.8 °F (37.1 °C). Her blood pressure is 117/81 and her pulse is 68. Her respiration is 16 and oxygen saturation is 100%.     Chief Complaint: Urinary Frequency (Urinary frequency with dysuria. Seen in ED on 11/17. UA, CX and blood work complete. Cx came back and Rx called in last night. 1 dose of abx complete. States recurrent UTI.)    Patient with recent UTI, took 1 dose of Cipro.  Frequency and dysuria may be a little improved.  Now having a sore throat, malaise, myalgias.  No fever.  No abdominal pain.  No flank pain.  No rash.    All other systems are negative    Chart reviewed    Objective:   Physical Exam   Constitutional: She appears well-developed.  Non-toxic appearance. She does not appear ill. No distress.   HENT:   Head: Atraumatic.   Nose: No purulent discharge. Right sinus exhibits no maxillary sinus tenderness and no frontal sinus tenderness. Left sinus exhibits no maxillary sinus tenderness and no frontal sinus tenderness.   Mouth/Throat: Uvula is midline. Posterior oropharyngeal erythema (faint) present. No oropharyngeal exudate.   Eyes: Right eye exhibits no discharge. Left eye exhibits no discharge. Extraocular movement intact   Neck: Neck supple. No neck rigidity present.   Cardiovascular: Regular rhythm.   Pulmonary/Chest: Effort normal and breath sounds normal. No respiratory distress. She has no wheezes. She has no rhonchi. She has no rales.   Abdominal: Soft. There is no abdominal tenderness. There is no rebound, no guarding, no left CVA tenderness and no right CVA tenderness.   Lymphadenopathy:     She has no cervical adenopathy.   Neurological: She is alert.   Skin: Skin is warm, dry and not diaphoretic.   Psychiatric: Her behavior is normal.   Nursing note and vitals reviewed.    Results for orders placed or performed in visit on " 11/21/24   POCT Strep A, Molecular    Collection Time: 11/21/24 10:28 AM   Result Value Ref Range    Molecular Strep A, POC Negative Negative     Acceptable Yes    SARS Coronavirus 2 Antigen, POCT Manual Read    Collection Time: 11/21/24 10:30 AM   Result Value Ref Range    SARS Coronavirus 2 Antigen Negative Negative     Acceptable Yes    POCT Influenza A/B Molecular    Collection Time: 11/21/24 10:31 AM   Result Value Ref Range    POC Molecular Influenza A Ag Negative Negative    POC Molecular Influenza B Ag Negative Negative     Acceptable Yes          Assessment:     1. Fatigue, unspecified type    2. Sore throat            Plan:   Discussed potential etiologies including viral syndrome in addition to cystitis.  Patient has a benign abdominal exam, no CVA tenderness.  She is in no distress.  Vitals are stable.  Recent urine culture with E coli, sensitive to Cipro.  I encouraged her to finish her entire course of Cipro as prescribed by the ER.  Encouraged fluids.  Prescription for Zofran at a more appropriate dose.  We discussed signs and symptoms that should prompt an immediate ER evaluation.  She seems to have good insight and voiced understanding.      Fatigue, unspecified type  -     POCT Influenza A/B Molecular  -     SARS Coronavirus 2 Antigen, POCT Manual Read  -     POCT Strep A, Molecular  -     ondansetron (ZOFRAN-ODT) 8 MG TbDL; Take 1 tablet (8 mg total) by mouth every 8 (eight) hours as needed (nausea).  Dispense: 10 tablet; Refill: 0    Sore throat  -     POCT Influenza A/B Molecular  -     SARS Coronavirus 2 Antigen, POCT Manual Read  -     POCT Strep A, Molecular        Please note: This chart was completed via voice to text dictation. It may contain typographical/word recognition errors. If there are any questions, please contact the provider for final clarification.

## 2024-12-07 ENCOUNTER — OFFICE VISIT (OUTPATIENT)
Dept: URGENT CARE | Facility: CLINIC | Age: 29
End: 2024-12-07
Payer: COMMERCIAL

## 2024-12-07 VITALS
HEIGHT: 66 IN | DIASTOLIC BLOOD PRESSURE: 66 MMHG | TEMPERATURE: 99 F | RESPIRATION RATE: 18 BRPM | WEIGHT: 167 LBS | SYSTOLIC BLOOD PRESSURE: 104 MMHG | HEART RATE: 62 BPM | BODY MASS INDEX: 26.84 KG/M2 | OXYGEN SATURATION: 100 %

## 2024-12-07 DIAGNOSIS — R31.9 URINARY TRACT INFECTION WITH HEMATURIA, SITE UNSPECIFIED: Primary | ICD-10-CM

## 2024-12-07 DIAGNOSIS — N39.0 URINARY TRACT INFECTION WITH HEMATURIA, SITE UNSPECIFIED: Primary | ICD-10-CM

## 2024-12-07 LAB
B-HCG UR QL: NEGATIVE
BACTERIA #/AREA URNS AUTO: ABNORMAL /HPF
BILIRUB UR QL STRIP.AUTO: NEGATIVE
BILIRUB UR QL STRIP: NEGATIVE
CLARITY UR: CLEAR
COLOR UR AUTO: ABNORMAL
CTP QC/QA: YES
GLUCOSE UR QL STRIP: ABNORMAL
GLUCOSE UR QL STRIP: NEGATIVE
HGB UR QL STRIP: NEGATIVE
HYALINE CASTS #/AREA URNS LPF: ABNORMAL /LPF
KETONES UR QL STRIP: NEGATIVE
KETONES UR QL STRIP: POSITIVE
LEUKOCYTE ESTERASE UR QL STRIP: NEGATIVE
LEUKOCYTE ESTERASE UR QL STRIP: NEGATIVE
MUCOUS THREADS URNS QL MICRO: ABNORMAL /LPF
NITRITE UR QL STRIP: NEGATIVE
PH UR STRIP: 6.5 [PH]
PH, POC UA: 6.5
POC BLOOD, URINE: POSITIVE
POC NITRATES, URINE: NEGATIVE
PROT UR QL STRIP: ABNORMAL
PROT UR QL STRIP: POSITIVE
RBC #/AREA URNS AUTO: ABNORMAL /HPF
SP GR UR STRIP.AUTO: 1.02 (ref 1–1.03)
SP GR UR STRIP: 1.02 (ref 1–1.03)
SQUAMOUS #/AREA URNS LPF: ABNORMAL /HPF
UROBILINOGEN UR STRIP-ACNC: 0.2 (ref 0.1–1.1)
UROBILINOGEN UR STRIP-ACNC: NORMAL
WBC #/AREA URNS AUTO: ABNORMAL /HPF

## 2024-12-07 PROCEDURE — 81025 URINE PREGNANCY TEST: CPT | Mod: PBBFAC | Performed by: FAMILY MEDICINE

## 2024-12-07 PROCEDURE — 99214 OFFICE O/P EST MOD 30 MIN: CPT | Mod: PBBFAC | Performed by: FAMILY MEDICINE

## 2024-12-07 PROCEDURE — 81001 URINALYSIS AUTO W/SCOPE: CPT | Performed by: FAMILY MEDICINE

## 2024-12-07 PROCEDURE — 99214 OFFICE O/P EST MOD 30 MIN: CPT | Mod: S$PBB,,, | Performed by: FAMILY MEDICINE

## 2024-12-07 PROCEDURE — 81003 URINALYSIS AUTO W/O SCOPE: CPT | Mod: PBBFAC | Performed by: FAMILY MEDICINE

## 2024-12-07 RX ORDER — NITROFURANTOIN 25; 75 MG/1; MG/1
100 CAPSULE ORAL 2 TIMES DAILY
Qty: 10 CAPSULE | Refills: 0 | Status: SHIPPED | OUTPATIENT
Start: 2024-12-07 | End: 2024-12-12

## 2024-12-07 NOTE — LETTER
December 7, 2024      [facility]  [address] [facility] [address]  Phone: [phone]       Patient: Ellis Ramos   YOB: 1995  Date of Visit: 12/07/2024    To Whom It May Concern:    Rafaela Ramos  was at [facility] on 12/07/2024. The patient may return to work/school on 12/08/24 with no restrictions. If you have any questions or concerns, or if I can be of further assistance, please do not hesitate to contact me.    Sincerely,    ANABELL Kumari MD

## 2024-12-07 NOTE — PROGRESS NOTES
"Subjective:       Patient ID: Ellis Ramos is a 29 y.o. female.    Chief Complaint: Flank Pain (Pt c/o Rt sided flank pain , pt states she had sx in April with complications /Denies any urinary problems )      Flank Pain      29-year-old female with right-sided flank pain.  She is undergoing treatment for cervicitis.  Denies painful urination.  Review of Systems   Genitourinary:  Positive for flank pain.         Objective:       Vital Signs  Temp: 98.6 °F (37 °C)  Pulse: 62  Resp: 18  SpO2: 100 %  BP: 104/66  Patient Position: Sitting  Pain Score:   7  Height and Weight  Height: 5' 6" (167.6 cm)  Weight: 75.8 kg (167 lb)  BSA (Calculated - sq m): 1.88 sq meters  BMI (Calculated): 27  Weight in (lb) to have BMI = 25: 154.6]  Physical Exam  Vitals reviewed.   Constitutional:       Appearance: Normal appearance.   HENT:      Head: Normocephalic and atraumatic.   Eyes:      Extraocular Movements: Extraocular movements intact.      Conjunctiva/sclera: Conjunctivae normal.   Cardiovascular:      Rate and Rhythm: Normal rate and regular rhythm.      Heart sounds: Normal heart sounds.   Pulmonary:      Breath sounds: Normal breath sounds.   Skin:     General: Skin is warm and dry.   Neurological:      General: No focal deficit present.      Mental Status: She is alert.   Psychiatric:         Mood and Affect: Mood and affect normal.         Speech: Speech normal.         Behavior: Behavior normal. Behavior is cooperative.         Thought Content: Thought content does not include homicidal or suicidal ideation.         Assessment:       Problem List Items Addressed This Visit    None  Visit Diagnoses       Urinary tract infection with hematuria, site unspecified    -  Primary    Relevant Medications    nitrofurantoin, macrocrystal-monohydrate, (MACROBID) 100 MG capsule    Other Relevant Orders    POCT urine pregnancy (Completed)    POCT Urinalysis, Dipstick, Manual, w/o Scope (Completed)    Urinalysis, Reflex to Urine " Culture            Plan:   Urine culture pending  Encouraged increased fluid intake  ER precautions  FU with PCP

## 2024-12-08 ENCOUNTER — TELEPHONE (OUTPATIENT)
Dept: URGENT CARE | Facility: CLINIC | Age: 29
End: 2024-12-08
Payer: COMMERCIAL

## 2024-12-08 NOTE — LETTER
December 8, 2024      [facility]  [address] [facility] [address]  Phone: [phone]       Patient: Ellis Ramos   YOB: 1995  Date of Visit: 12/07/2024    To Whom It May Concern:    Rafaela Ramos  was at [facility] on 12/07/2024. The patient may return to work/school on 12/09/2024 with no restrictions. If you have any questions or concerns, or if I can be of further assistance, please do not hesitate to contact me.    Sincerely,      ANABELL Kumari MD

## 2024-12-16 ENCOUNTER — OFFICE VISIT (OUTPATIENT)
Dept: GYNECOLOGY | Facility: CLINIC | Age: 29
End: 2024-12-16
Payer: COMMERCIAL

## 2024-12-16 VITALS
DIASTOLIC BLOOD PRESSURE: 74 MMHG | HEIGHT: 67 IN | WEIGHT: 167 LBS | TEMPERATURE: 99 F | HEART RATE: 64 BPM | BODY MASS INDEX: 26.21 KG/M2 | SYSTOLIC BLOOD PRESSURE: 118 MMHG | OXYGEN SATURATION: 100 % | RESPIRATION RATE: 20 BRPM

## 2024-12-16 DIAGNOSIS — G58.9 NERVE ENTRAPMENT: Primary | ICD-10-CM

## 2024-12-16 PROCEDURE — 20552 NJX 1/MLT TRIGGER POINT 1/2: CPT | Mod: PBBFAC

## 2024-12-16 PROCEDURE — 99214 OFFICE O/P EST MOD 30 MIN: CPT | Mod: PBBFAC

## 2024-12-16 RX ORDER — BUPIVACAINE HYDROCHLORIDE 2.5 MG/ML
15 INJECTION, SOLUTION EPIDURAL; INFILTRATION; INTRACAUDAL
Status: COMPLETED | OUTPATIENT
Start: 2024-12-16 | End: 2024-12-16

## 2024-12-16 RX ADMIN — BUPIVACAINE HYDROCHLORIDE 37.5 MG: 2.5 INJECTION, SOLUTION EPIDURAL; INFILTRATION; INTRACAUDAL at 11:12

## 2024-12-16 NOTE — PROCEDURES
Trigger Point Injection Procedure Note     Date of procedure:  08/12/2024     Procedure(s):   Trigger point injection     Indication: RLQ nerve entrapment     Procedure technique:      Ellis Ramos was counseled on risks, benefits, and alternatives to her procedure today. A consent form was reviewed and signed. All questions were answered to her satisfaction.  After discussing and obtaining informed consent, a time-out was performed confirming her identity, surgical site, planned procedure, and that all necessary equipment was available. She was positioned on the exam table in supine position. The RLQ laparoscopic port site was cleansed with Alcohol prep pad. A total of 15 mL of BUPivacaine (PF) 0.25% (2.5 mg/ml) injection 37.5 mg   without epinephrine was injected along the dermis and subcutaneous tissue deep to the incision with dry needling performed throughout. The patient tolerated the procedure well without complications.    Dr. Murrell present throughout entire procedure     RTC in 4 months for repeat trigger point injection, consider kenalog with next injection     Sonali Perez MD   PGY2, Obstetrics & Gynecology   [facility]

## 2024-12-16 NOTE — LETTER
December 16, 2024      [facility]  [address] [facility] [address]  Phone: [phone]       Patient: Ellis Ramos     Date of Visit: 12/16/2024    To Whom It May Concern:    Ellis Ramos  was at [facility] on 12/16/2024. The patient may return to work/school on 12/18/2024 with no restrictions. If you have any questions or concerns, or if I can be of further assistance, please do not hesitate to contact me.    Sincerely,    LARA Perez MD

## 2025-01-15 ENCOUNTER — OFFICE VISIT (OUTPATIENT)
Dept: URGENT CARE | Facility: CLINIC | Age: 30
End: 2025-01-15
Payer: COMMERCIAL

## 2025-01-15 VITALS
HEART RATE: 89 BPM | OXYGEN SATURATION: 100 % | TEMPERATURE: 98 F | WEIGHT: 169.56 LBS | SYSTOLIC BLOOD PRESSURE: 110 MMHG | RESPIRATION RATE: 16 BRPM | HEIGHT: 66 IN | BODY MASS INDEX: 27.25 KG/M2 | DIASTOLIC BLOOD PRESSURE: 74 MMHG

## 2025-01-15 DIAGNOSIS — R10.9 ABDOMINAL PAIN, UNSPECIFIED ABDOMINAL LOCATION: Primary | ICD-10-CM

## 2025-01-15 LAB
B-HCG UR QL: NEGATIVE
BACTERIA #/AREA URNS AUTO: ABNORMAL /HPF
BILIRUB UR QL STRIP.AUTO: NEGATIVE
BILIRUB UR QL STRIP: NEGATIVE
CLARITY UR: CLEAR
COLOR UR AUTO: ABNORMAL
CTP QC/QA: YES
GLUCOSE UR QL STRIP: NEGATIVE
GLUCOSE UR QL STRIP: NORMAL
HGB UR QL STRIP: ABNORMAL
HYALINE CASTS #/AREA URNS LPF: ABNORMAL /LPF
KETONES UR QL STRIP: NEGATIVE
KETONES UR QL STRIP: NEGATIVE
LEUKOCYTE ESTERASE UR QL STRIP: NEGATIVE
LEUKOCYTE ESTERASE UR QL STRIP: NEGATIVE
MUCOUS THREADS URNS QL MICRO: ABNORMAL /LPF
NITRITE UR QL STRIP: NEGATIVE
PH UR STRIP: 5.5 [PH]
PH, POC UA: 6
POC BLOOD, URINE: POSITIVE
POC NITRATES, URINE: NEGATIVE
PROT UR QL STRIP: NEGATIVE
PROT UR QL STRIP: NEGATIVE
RBC #/AREA URNS AUTO: ABNORMAL /HPF
SP GR UR STRIP.AUTO: 1.02 (ref 1–1.03)
SP GR UR STRIP: 1.02 (ref 1–1.03)
SQUAMOUS #/AREA URNS LPF: ABNORMAL /HPF
UROBILINOGEN UR STRIP-ACNC: ABNORMAL (ref 0.1–1.1)
UROBILINOGEN UR STRIP-ACNC: NORMAL
WBC #/AREA URNS AUTO: ABNORMAL /HPF

## 2025-01-15 PROCEDURE — 81001 URINALYSIS AUTO W/SCOPE: CPT | Performed by: FAMILY MEDICINE

## 2025-01-15 PROCEDURE — 81003 URINALYSIS AUTO W/O SCOPE: CPT | Mod: PBBFAC | Performed by: FAMILY MEDICINE

## 2025-01-15 PROCEDURE — 99215 OFFICE O/P EST HI 40 MIN: CPT | Mod: PBBFAC | Performed by: FAMILY MEDICINE

## 2025-01-15 PROCEDURE — 81025 URINE PREGNANCY TEST: CPT | Mod: PBBFAC | Performed by: FAMILY MEDICINE

## 2025-01-15 PROCEDURE — 99213 OFFICE O/P EST LOW 20 MIN: CPT | Mod: S$PBB,,, | Performed by: FAMILY MEDICINE

## 2025-01-15 NOTE — PROGRESS NOTES
"Subjective:       Patient ID: Ellis Ramos is a 29 y.o. female.    Vitals:  height is 5' 6" (1.676 m) and weight is 76.9 kg (169 lb 8.5 oz). Her temperature is 98.2 °F (36.8 °C). Her blood pressure is 110/74 and her pulse is 89. Her respiration is 16 and oxygen saturation is 100%.     Chief Complaint: Abdominal Pain (X 2days )    Patient with several days/weeks of intermittent suprapubic pressure.  No associated dysuria or frequency, no vaginal discharge or bleeding.  No fever.  No back or flank pain.  No rash.  States a history of interstitial cystitis.  Has a PCP.    All other systems are negative    Chart reviewed    Objective:   Physical Exam   Constitutional:  Non-toxic appearance. She does not appear ill. No distress.   Neck: Neck supple.   Abdominal: Normal appearance. She exhibits no distension. flat abdomen There is no abdominal tenderness. There is no rebound, no guarding, no left CVA tenderness and no right CVA tenderness.   Neurological: no focal deficit. She is alert.   Skin: Skin is warm, dry and not diaphoretic.   Psychiatric: Her behavior is normal. Mood normal.   Nursing note and vitals reviewed.      Results for orders placed or performed in visit on 01/15/25   POCT Urinalysis, Dipstick, Automated, W/O Scope    Collection Time: 01/15/25  2:06 PM   Result Value Ref Range    POC Blood, Urine Positive (A) Negative    POC Bilirubin, Urine Negative Negative    POC Urobilinogen, Urine 0.2e.u./dl 0.1 - 1.1    POC Ketones, Urine Negative Negative    POC Protein, Urine Negative Negative    POC Nitrates, Urine Negative Negative    POC Glucose, Urine Negative Negative    pH, UA 6.0     POC Specific Gravity, Urine 1.025 1.003 - 1.029    POC Leukocytes, Urine Negative Negative   POCT urine pregnancy    Collection Time: 01/15/25  2:07 PM   Result Value Ref Range    POC Preg Test, Ur Negative Negative     Acceptable Yes        Assessment:     1. Abdominal pain, unspecified abdominal location     "        Plan:   Will send urine for micro and reflex culture, notify if indicated.  Had a discussion about her history of interstitial cystitis.  Encouraged her to contact her PCP office to arrange a timely follow-up.  Return to urgent care if we can help.          Abdominal pain, unspecified abdominal location  -     POCT urine pregnancy  -     POCT Urinalysis, Dipstick, Automated, W/O Scope  -     Urinalysis, Reflex to Urine Culture        Please note: This chart was completed via voice to text dictation. It may contain typographical/word recognition errors. If there are any questions, please contact the provider for final clarification.

## 2025-02-09 ENCOUNTER — OFFICE VISIT (OUTPATIENT)
Dept: URGENT CARE | Facility: CLINIC | Age: 30
End: 2025-02-09
Payer: COMMERCIAL

## 2025-02-09 VITALS
DIASTOLIC BLOOD PRESSURE: 64 MMHG | OXYGEN SATURATION: 100 % | BODY MASS INDEX: 26.95 KG/M2 | WEIGHT: 167.69 LBS | RESPIRATION RATE: 18 BRPM | SYSTOLIC BLOOD PRESSURE: 92 MMHG | HEIGHT: 66 IN | HEART RATE: 83 BPM

## 2025-02-09 DIAGNOSIS — J06.9 UPPER RESPIRATORY TRACT INFECTION, UNSPECIFIED TYPE: Primary | ICD-10-CM

## 2025-02-09 PROCEDURE — 99213 OFFICE O/P EST LOW 20 MIN: CPT | Mod: S$PBB,,,

## 2025-02-09 PROCEDURE — 99214 OFFICE O/P EST MOD 30 MIN: CPT | Mod: PBBFAC

## 2025-02-09 RX ORDER — DEXBROMPHENIRAMINE MALEATE, PHENYLEPHRINE HYDROCHLORIDE 2; 7.5 MG/1; MG/1
2-7.5 TABLET ORAL 2 TIMES DAILY
Qty: 14 TABLET | Refills: 0 | Status: SHIPPED | OUTPATIENT
Start: 2025-02-09 | End: 2025-02-16

## 2025-02-09 RX ORDER — FLUTICASONE PROPIONATE 50 MCG
2 SPRAY, SUSPENSION (ML) NASAL DAILY
Qty: 16 G | Refills: 1 | Status: SHIPPED | OUTPATIENT
Start: 2025-02-09 | End: 2025-03-11

## 2025-02-09 NOTE — PROGRESS NOTES
"Subjective:       Patient ID: Regina Horton is a 29 y.o. female.    Vitals:  height is 5' 6" (1.676 m) and weight is 76.1 kg (167 lb 11.2 oz). Her blood pressure is 92/64 and her pulse is 83. Her respiration is 18 and oxygen saturation is 100%.     Chief Complaint: Sinus Problem (Congestion, sore throat, x 5 days.  Otc cough syrup, reports yellow nasal drainage. Did not want strep testing.)    29-year-old female reports to the clinic with complaints congestion, sinus strip in the back of her throat, and a sore throat that began 5 days ago.  Patient states she has been using over-the-counter cough syrup with mild relief and reports a yellow nasal drainage when she does blow her nose.  Patient is denying respiratory and strep testing at today's visit.  Patient denies fever, nausea, vomiting, abdominal pain, headache, chills, fever, body aches, cough.    All other systems are negative    Chart reviewed    Objective:   Physical Exam   Constitutional: She appears well-developed.  Non-toxic appearance. She does not appear ill. No distress.   HENT:   Head: Normocephalic and atraumatic.   Ears:   Right Ear: Hearing, tympanic membrane, external ear and ear canal normal.   Left Ear: Hearing, tympanic membrane, external ear and ear canal normal.   Nose: Mucosal edema and rhinorrhea present. No purulent discharge. Right sinus exhibits no maxillary sinus tenderness and no frontal sinus tenderness. Left sinus exhibits no maxillary sinus tenderness and no frontal sinus tenderness.   Mouth/Throat: Uvula is midline and mucous membranes are normal. Posterior oropharyngeal edema and posterior oropharyngeal erythema present.   Eyes: Right eye exhibits no discharge. Left eye exhibits no discharge. Extraocular movement intact   Neck: Neck supple. No neck rigidity present.   Cardiovascular: Regular rhythm.   Pulmonary/Chest: Effort normal and breath sounds normal. No respiratory distress. She has no wheezes. She has no rhonchi. She has " no rales.   Lymphadenopathy:     She has no cervical adenopathy.   Neurological: She is alert.   Skin: Skin is warm, dry and not diaphoretic.   Psychiatric: Her behavior is normal.   Nursing note and vitals reviewed.      Assessment:     1. Upper respiratory tract infection, unspecified type            Plan:     If a test result is still pending, we will notify you if it is positive.  However, you can see all of your results on your patient portal.    Please drink plenty of fluids.  Please get plenty of rest.  Please return here or go to the Emergency Department for any concerns or worsening of condition.  If you were prescribed antibiotics, please take them to completion.  If you were given wait & see antibiotics, please wait 4-7 days before taking them, and only take them if your symptoms have not improved, or your symptoms have worsened.  If you do begin taking the antibiotics, please take them to completion.  If you were prescribed a narcotic medication, do not drive or operate heavy equipment or machinery while taking these medications.  If you were given a steroid shot in the clinic and have also been given a prescription for a steroid such as Prednisone or a Medrol Dose Pack, please begin taking them tomorrow.  If you do not have Hypertension or any history of palpitations, it is ok to take over the counter Sudafed or Mucinex D or Allegra-D or Claritin-D or Zyrtec-D.  If you do take one of the above, it is ok to combine that with plain over the counter Mucinex or Allegra or Claritin or Zyrtec.  If for example you are taking Zyrtec -D, you can combine that with Mucinex, but not Mucinex-D.  If you are taking Mucinex-D, you can combine that with plain Allegra or Claritin or Zyrtec.   If you do have Hypertension or palpitations, it is safe to take Coricidin HBP for relief of sinus symptoms.  If not allergic and not contraindicated because of your medical conditions, please take over the counter Tylenol  (Acetaminophen) and/or Motrin (Ibuprofen) as directed for control of pain and/or fever.  Please follow up with your primary care doctor or specialist as needed.  If you  smoke, please stop smoking.      Upper respiratory tract infection, unspecified type  -     dexbrompheniramine-phenyleph (ALAHIST PE) 2-7.5 mg Tab; Take 2-7.5 mg by mouth 2 (two) times a day. for 7 days  Dispense: 14 tablet; Refill: 0  -     fluticasone propionate (FLONASE) 50 mcg/actuation nasal spray; 2 sprays (100 mcg total) by Each Nostril route once daily.  Dispense: 16 g; Refill: 1        Please note: This chart was completed via voice to text dictation. It may contain typographical/word recognition errors. If there are any questions, please contact the provider for final clarification.

## 2025-02-09 NOTE — LETTER
February 9, 2025      [facility]  [facility] [address]  Phone: [phone]       Patient: Regina Horton   YOB: 1995  Date of Visit: 02/09/2025    To Whom It May Concern:    Aline Horton  was at [facility] on 02/09/2025. The patient may return to work/school on 2/10/2025 with no restrictions. If you have any questions or concerns, or if I can be of further assistance, please do not hesitate to contact me.    Sincerely,    ROGER Cline

## 2025-02-11 ENCOUNTER — TELEPHONE (OUTPATIENT)
Dept: GYNECOLOGY | Facility: CLINIC | Age: 30
End: 2025-02-11
Payer: COMMERCIAL

## 2025-02-13 ENCOUNTER — CLINICAL SUPPORT (OUTPATIENT)
Dept: GYNECOLOGY | Facility: CLINIC | Age: 30
End: 2025-02-13
Payer: COMMERCIAL

## 2025-02-13 DIAGNOSIS — N93.9 ABNORMAL UTERINE BLEEDING: Primary | ICD-10-CM

## 2025-02-13 PROCEDURE — 96372 THER/PROPH/DIAG INJ SC/IM: CPT | Mod: PBBFAC

## 2025-02-13 PROCEDURE — 99211 OFF/OP EST MAY X REQ PHY/QHP: CPT | Mod: PBBFAC

## 2025-02-13 RX ORDER — MEDROXYPROGESTERONE ACETATE 150 MG/ML
150 INJECTION, SUSPENSION INTRAMUSCULAR
Status: DISCONTINUED | OUTPATIENT
Start: 2025-02-13 | End: 2025-02-13

## 2025-02-13 RX ORDER — MEDROXYPROGESTERONE ACETATE 150 MG/ML
150 INJECTION, SUSPENSION INTRAMUSCULAR
Status: COMPLETED | OUTPATIENT
Start: 2025-02-13 | End: 2025-02-13

## 2025-02-13 RX ADMIN — MEDROXYPROGESTERONE ACETATE 150 MG: 150 INJECTION, SUSPENSION INTRAMUSCULAR at 03:02

## 2025-03-02 ENCOUNTER — OFFICE VISIT (OUTPATIENT)
Dept: URGENT CARE | Facility: CLINIC | Age: 30
End: 2025-03-02
Payer: COMMERCIAL

## 2025-03-02 VITALS
HEIGHT: 67 IN | WEIGHT: 170 LBS | DIASTOLIC BLOOD PRESSURE: 79 MMHG | SYSTOLIC BLOOD PRESSURE: 118 MMHG | RESPIRATION RATE: 18 BRPM | TEMPERATURE: 99 F | HEART RATE: 71 BPM | OXYGEN SATURATION: 100 % | BODY MASS INDEX: 26.68 KG/M2

## 2025-03-02 DIAGNOSIS — H92.01 RIGHT EAR PAIN: Primary | ICD-10-CM

## 2025-03-02 PROCEDURE — 99213 OFFICE O/P EST LOW 20 MIN: CPT | Mod: ,,, | Performed by: NURSE PRACTITIONER

## 2025-03-02 RX ORDER — OFLOXACIN 3 MG/ML
5 SOLUTION AURICULAR (OTIC) 2 TIMES DAILY
Qty: 10 ML | Refills: 0 | Status: SHIPPED | OUTPATIENT
Start: 2025-03-02 | End: 2025-03-09

## 2025-03-02 NOTE — PROGRESS NOTES
"Subjective:      Patient ID: Regina Horton is a 29 y.o. female.    Vitals:  height is 5' 7" (1.702 m) and weight is 77.1 kg (170 lb). Her oral temperature is 98.6 °F (37 °C). Her blood pressure is 118/79 and her pulse is 71. Her respiration is 18 and oxygen saturation is 100%.     Chief Complaint: Otalgia     Patient is a 29 y.o. female who presents to urgent care with complaints of right ear pain x1 weeks. Alleviating factors include Tylenol  with no relief. Patient denies any other symptoms.   Patient does wear airpods daily    Otalgia   Pertinent negatives include no diarrhea, headaches, sore throat or vomiting.     Constitution: Negative for fatigue and fever.   HENT:  Positive for ear pain. Negative for sinus pain and sore throat.    Cardiovascular: Negative.    Eyes: Negative.    Gastrointestinal:  Negative for nausea, vomiting and diarrhea.   Endocrine: negative.   Genitourinary:  Negative for dysuria, frequency, urgency and urine decreased.   Musculoskeletal:  Negative for muscle ache.   Neurological: Negative.  Negative for headaches.      Objective:     Physical Exam   Constitutional: She is oriented to person, place, and time. She appears well-developed. She is cooperative.  Non-toxic appearance. She does not appear ill. No distress.   HENT:   Head: Normocephalic and atraumatic.   Ears:   Right Ear: Hearing, tympanic membrane, external ear and ear canal normal. No lacerations. There is tenderness. No no drainage or swelling. No foreign bodies. Tympanic membrane is not erythematous, not retracted and not bulging. No middle ear effusion. No decreased hearing is noted.   Left Ear: Hearing, tympanic membrane, external ear and ear canal normal.   Nose: Nose normal. No mucosal edema, rhinorrhea or nasal deformity. No epistaxis. Right sinus exhibits no maxillary sinus tenderness and no frontal sinus tenderness. Left sinus exhibits no maxillary sinus tenderness and no frontal sinus tenderness.   Mouth/Throat: " Uvula is midline, oropharynx is clear and moist and mucous membranes are normal. No trismus in the jaw. Normal dentition. No uvula swelling. No oropharyngeal exudate, posterior oropharyngeal edema or posterior oropharyngeal erythema.   To the right ear canal there is slight irritation noted in the very proximal aspect of the canal but no swelling foreign body visualized.      Comments: To the right ear canal there is slight irritation noted in the very proximal aspect of the canal but no swelling foreign body visualized.  Eyes: Conjunctivae and lids are normal. No scleral icterus.   Neck: Trachea normal and phonation normal. Neck supple. No edema present. No erythema present. No neck rigidity present.   Cardiovascular: Normal rate, regular rhythm, normal heart sounds and normal pulses.   Pulmonary/Chest: Effort normal and breath sounds normal. No respiratory distress. She has no decreased breath sounds. She has no rhonchi.   Abdominal: Normal appearance.   Musculoskeletal: Normal range of motion.         General: No deformity. Normal range of motion.   Neurological: She is alert and oriented to person, place, and time. She exhibits normal muscle tone. Coordination normal.   Skin: Skin is warm, dry, intact, not diaphoretic and not pale.   Psychiatric: Her speech is normal and behavior is normal. Judgment and thought content normal.   Nursing note and vitals reviewed.      Assessment:     1. Right ear pain        Plan:       Right ear pain

## 2025-03-02 NOTE — PATIENT INSTRUCTIONS
Apply eardrops as directed use ibuprofen 600 mg over-the-counter as needed for pain and monitor for any worsening symptoms or not improvement if this does occur please have this re-evaluated

## 2025-03-02 NOTE — LETTER
March 2, 2025      [facility] [place] [address]  [facility] [address]  Phone: [phone]       Patient: Regina Horton   YOB: 1995  Date of Visit: 03/02/2025    To Whom It May Concern:    Aline Horton  was at [facility] on 03/02/2025. The patient may return to work/school on 03/03/25 with no restrictions. If you have any questions or concerns, or if I can be of further assistance, please do not hesitate to contact me.    Sincerely,    Quinten Ames MA

## 2025-03-09 ENCOUNTER — OFFICE VISIT (OUTPATIENT)
Dept: URGENT CARE | Facility: CLINIC | Age: 30
End: 2025-03-09
Payer: COMMERCIAL

## 2025-03-09 VITALS
BODY MASS INDEX: 26.68 KG/M2 | TEMPERATURE: 98 F | SYSTOLIC BLOOD PRESSURE: 112 MMHG | HEIGHT: 67 IN | WEIGHT: 170 LBS | RESPIRATION RATE: 17 BRPM | OXYGEN SATURATION: 99 % | DIASTOLIC BLOOD PRESSURE: 76 MMHG | HEART RATE: 79 BPM

## 2025-03-09 DIAGNOSIS — M54.10 RADICULITIS: ICD-10-CM

## 2025-03-09 DIAGNOSIS — M54.12 CERVICAL RADICULITIS: Primary | ICD-10-CM

## 2025-03-09 RX ORDER — DEXAMETHASONE SODIUM PHOSPHATE 10 MG/ML
8 INJECTION INTRAMUSCULAR; INTRAVENOUS
Status: COMPLETED | OUTPATIENT
Start: 2025-03-09 | End: 2025-03-09

## 2025-03-09 RX ORDER — METHOCARBAMOL 750 MG/1
750 TABLET, FILM COATED ORAL 3 TIMES DAILY PRN
Qty: 15 TABLET | Refills: 0 | Status: SHIPPED | OUTPATIENT
Start: 2025-03-09 | End: 2025-03-14

## 2025-03-09 RX ORDER — KETOROLAC TROMETHAMINE 30 MG/ML
30 INJECTION, SOLUTION INTRAMUSCULAR; INTRAVENOUS
Status: COMPLETED | OUTPATIENT
Start: 2025-03-09 | End: 2025-03-09

## 2025-03-09 RX ADMIN — DEXAMETHASONE SODIUM PHOSPHATE 8 MG: 10 INJECTION INTRAMUSCULAR; INTRAVENOUS at 09:03

## 2025-03-09 RX ADMIN — KETOROLAC TROMETHAMINE 30 MG: 30 INJECTION, SOLUTION INTRAMUSCULAR; INTRAVENOUS at 09:03

## 2025-03-09 NOTE — PATIENT INSTRUCTIONS
Assessment/Plan:   Cervical radiculitis  -     dexAMETHasone injection 8 mg  -     ketorolac injection 30 mg  -     methocarbamoL (ROBAXIN) 750 MG Tab; Take 1 tablet (750 mg total) by mouth 3 (three) times daily as needed (Neck pain and stiffness).  Dispense: 15 tablet; Refill: 0  Low suspicion for meningitis at this time.  Treatment as above and below.  ER precautions should no improvement occur over the next 12 hours.  Radiculitis  As above and below.  Of bloating neck stretches exercises to her portal etc.         Education and counseling done face to face regarding medical conditions and plan. Contact office if new symptoms develop. Should any symptoms ever significantly worsen seek emergency medical attention/go to ER

## 2025-03-09 NOTE — PROGRESS NOTES
Patient ID: Regina Horton is a 29 y.o. female.  Chief Complaint: Neck Pain    HPI:   Patient presents here today for above reason.      Patient is a 29 y.o. female who presents to urgent care with complaints of posterior neck and shoulder pain x 3 day onset. Mechanism of injury sustained from lifting heavy objects, medcicated with ibuprofen w/o improvement. (+) stiffness, (-) numbness, tingling, HA, n/v. Currently at 8-9/10 pain scale. Exacerbated with deep breathing, bending/turning the neck.     Past Medical History:  Past Medical History:   Diagnosis Date    Abnormal Pap smear of cervix      Past Surgical History:   Procedure Laterality Date    CHROMOTUBATION OF FALLOPIAN TUBE Bilateral 4/11/2024    Procedure: CHROMOTUBATION, OVIDUCT;  Surgeon: Tammy Saunders MD;  Location: [facility];  Service: OB/GYN;  Laterality: Bilateral;    CONIZATION OF CERVIX USING LOOP ELECTROSURGICAL EXCISION PROCEDURE (LEEP) N/A 4/11/2024    Procedure: LEEP CONIZATION, CERVIX;  Surgeon: Tammy Saunders MD;  Location: [facility];  Service: OB/GYN;  Laterality: N/A;    CYST REMOVAL      On right foot    CYSTOSCOPY N/A 4/11/2024    Procedure: CYSTOSCOPY;  Surgeon: Tammy Saunders MD;  Location: [facility] OR;  Service: OB/GYN;  Laterality: N/A;    DIAGNOSTIC LAPAROSCOPY N/A 4/11/2024    Procedure: LAPAROSCOPY, DIAGNOSTIC;  Surgeon: Tammy Saunders MD;  Location: [facility] OR;  Service: OB/GYN;  Laterality: N/A;  2 cameras, 2 light cords  Top & bottom @same time    HYSTEROSCOPY WITH DILATION AND CURETTAGE OF UTERUS N/A 4/11/2024    Procedure: HYSTEROSCOPY, WITH DILATION AND CURETTAGE OF UTERUS;  Surgeon: Tammy Saunders MD;  Location: [facility] OR;  Service: OB/GYN;  Laterality: N/A;    REPAIR, HERNIA, UMBILICAL N/A 7/26/2024    Procedure: REPAIR, HERNIA, UMBILICAL;  Surgeon: Grant Wallace Jr., MD;  Location: [facility] OR;  Service: General;  Laterality: N/A;     Review of patient's allergies indicates:   Allergen Reactions    Amoxicillin Dermatitis     " Other reaction(s): Rash    Penicillin      Current Outpatient Medications   Medication Instructions    acetaminophen (TYLENOL) 1,000 mg, Oral, Every 6 hours PRN    fluticasone propionate (FLONASE) 100 mcg, Each Nostril, Daily    ibuprofen (ADVIL,MOTRIN) 600 mg, Oral, 3 times daily    ibuprofen (ADVIL,MOTRIN) 800 mg, Oral, 3 times daily    LIDOcaine (LIDODERM) 5 % 1 patch, Transdermal, Daily, Remove & Discard patch within 12 hours or as directed by MD. Apply on abdomen near but not directly touching incision.    methocarbamoL (ROBAXIN) 750 mg, Oral, 3 times daily PRN     Social History[1]    ROS:   Review of Systems  12 point review of systems conducted, negative except as stated in the history of present illness. See HPI for details.   Vitals/PE:   Visit Vitals  /76 (Patient Position: Sitting)   Pulse 79   Temp 98.1 °F (36.7 °C)   Resp 17   Ht 5' 7" (1.702 m)   Wt 77.1 kg (170 lb)   LMP  (LMP Unknown)   SpO2 99%   BMI 26.63 kg/m²     Physical Exam  General: Alert and oriented, No acute distress.   Eye: Normal conjunctiva without exudate.  HENMT: Normocephalic/AT, Normal hearing, Oral mucosa is moist and pink   Neck: No goiter visualized.   Respiratory: Lungs CTAB, Respirations are non-labored, Breath sounds are equal, Symmetrical chest wall expansion.  Cardiovascular: Normal rate, Regular rhythm, No murmur, No edema.   Gastrointestinal: Non-distended.   Genitourinary: Deferred.  Musculoskeletal: Normal ROM, Normal gait, No deformities or amputations.  Integumentary: Warm, Dry, Intact. No diaphoresis, or flushing.  Neurologic: No focal deficits, Cranial Nerves II-XII are grossly intact.   Psychiatric: Cooperative, Appropriate mood & affect, Normal judgment, Non-suicidal.    Results for orders placed or performed in visit on 01/15/25   POCT Urinalysis, Dipstick, Automated, W/O Scope    Collection Time: 01/15/25  2:06 PM   Result Value Ref Range    POC Blood, Urine Positive (A) Negative    POC Bilirubin, Urine " Negative Negative    POC Urobilinogen, Urine 0.2e.u./dl 0.1 - 1.1    POC Ketones, Urine Negative Negative    POC Protein, Urine Negative Negative    POC Nitrates, Urine Negative Negative    POC Glucose, Urine Negative Negative    pH, UA 6.0     POC Specific Gravity, Urine 1.025 1.003 - 1.029    POC Leukocytes, Urine Negative Negative   POCT urine pregnancy    Collection Time: 01/15/25  2:07 PM   Result Value Ref Range    POC Preg Test, Ur Negative Negative     Acceptable Yes    Urinalysis, Reflex to Urine Culture    Collection Time: 01/15/25  2:18 PM    Specimen: Urine   Result Value Ref Range    Color, UA Light-Yellow Yellow, Light-Yellow, Dark Yellow, Sandra, Straw    Appearance, UA Clear Clear    Specific Gravity, UA 1.021 1.005 - 1.030    pH, UA 5.5 5.0 - 8.5    Protein, UA Negative Negative    Glucose, UA Normal Negative, Normal    Ketones, UA Negative Negative    Blood, UA Trace (A) Negative    Bilirubin, UA Negative Negative    Urobilinogen, UA Normal 0.2, 1.0, Normal    Nitrites, UA Negative Negative    Leukocyte Esterase, UA Negative Negative    RBC, UA 0-5 None Seen, 0-2, 3-5, 0-5 /HPF    WBC, UA 0-5 None Seen, 0-2, 3-5, 0-5 /HPF    Bacteria, UA None Seen None Seen /HPF    Squamous Epithelial Cells, UA Trace (A) None Seen /HPF    Mucous, UA Occasional (A) None Seen /LPF    Hyaline Casts, UA None Seen None Seen /lpf     Assessment/Plan:   Cervical radiculitis  -     dexAMETHasone injection 8 mg  -     ketorolac injection 30 mg  -     methocarbamoL (ROBAXIN) 750 MG Tab; Take 1 tablet (750 mg total) by mouth 3 (three) times daily as needed (Neck pain and stiffness).  Dispense: 15 tablet; Refill: 0  Low suspicion for meningitis at this time.  Treatment as above and below.  ER precautions should no improvement occur over the next 12 hours.  Radiculitis  As above and below.  Of bloating neck stretches exercises to her portal etc.         Education and counseling done face to face regarding medical  conditions and plan. Contact office if new symptoms develop. Should any symptoms ever significantly worsen seek emergency medical attention/go to ER. Follow up at least yearly for wellness or sooner PRN. Nurse to call patient with any results. The patient is receptive, expresses understanding and is agreeable to plan. All questions have been answered.             [1]   Social History  Socioeconomic History    Marital status: Single   Tobacco Use    Smoking status: Never     Passive exposure: Never    Smokeless tobacco: Never   Substance and Sexual Activity    Alcohol use: Not Currently     Comment: Occasionally    Drug use: Yes     Types: Marijuana     Comment: occ    Sexual activity: Not Currently     Partners: Male     Birth control/protection: None   Social History Narrative    ** Merged History Encounter **

## 2025-03-09 NOTE — LETTER
March 9, 2025      [facility] [place] [address] [address] [facility] [facility] [address]  Phone: [phone]       Patient: Regina Horton   YOB: 1995  Date of Visit: 03/09/2025    To Whom It May Concern:    Aline Horton  was at [facility] on 03/09/2025. The patient may return to work on 03/11/2025 with no restrictions. If you have any questions or concerns, or if I can be of further assistance, please do not hesitate to contact me.    Sincerely,    Betsy Ernst LPN

## 2025-03-10 ENCOUNTER — HOSPITAL ENCOUNTER (EMERGENCY)
Facility: HOSPITAL | Age: 30
Discharge: HOME | End: 2025-03-10
Attending: EMERGENCY MEDICINE
Payer: COMMERCIAL

## 2025-03-10 VITALS
HEIGHT: 67 IN | OXYGEN SATURATION: 100 % | DIASTOLIC BLOOD PRESSURE: 78 MMHG | SYSTOLIC BLOOD PRESSURE: 123 MMHG | BODY MASS INDEX: 26.37 KG/M2 | HEART RATE: 68 BPM | WEIGHT: 168 LBS | TEMPERATURE: 99 F | RESPIRATION RATE: 18 BRPM

## 2025-03-10 DIAGNOSIS — M62.838 MUSCLE SPASMS OF NECK: ICD-10-CM

## 2025-03-10 DIAGNOSIS — S16.1XXA STRAIN OF NECK MUSCLE, INITIAL ENCOUNTER: Primary | ICD-10-CM

## 2025-03-10 LAB
B-HCG UR QL: NEGATIVE
CTP QC/QA: YES

## 2025-03-10 PROCEDURE — 63600175 PHARM REV CODE 636 W HCPCS: Mod: JZ,TB | Performed by: EMERGENCY MEDICINE

## 2025-03-10 PROCEDURE — 96372 THER/PROPH/DIAG INJ SC/IM: CPT | Performed by: EMERGENCY MEDICINE

## 2025-03-10 PROCEDURE — 81025 URINE PREGNANCY TEST: CPT | Performed by: EMERGENCY MEDICINE

## 2025-03-10 PROCEDURE — 25000003 PHARM REV CODE 250: Performed by: EMERGENCY MEDICINE

## 2025-03-10 PROCEDURE — 99284 EMERGENCY DEPT VISIT MOD MDM: CPT | Mod: 25

## 2025-03-10 RX ORDER — HYDROCODONE BITARTRATE AND ACETAMINOPHEN 10; 325 MG/1; MG/1
1 TABLET ORAL
Refills: 0 | Status: DISCONTINUED | OUTPATIENT
Start: 2025-03-10 | End: 2025-03-10 | Stop reason: HOSPADM

## 2025-03-10 RX ORDER — CYCLOBENZAPRINE HCL 10 MG
10 TABLET ORAL 3 TIMES DAILY PRN
Status: DISCONTINUED | OUTPATIENT
Start: 2025-03-10 | End: 2025-03-10 | Stop reason: HOSPADM

## 2025-03-10 RX ORDER — KETOROLAC TROMETHAMINE 30 MG/ML
15 INJECTION, SOLUTION INTRAMUSCULAR; INTRAVENOUS
Status: COMPLETED | OUTPATIENT
Start: 2025-03-10 | End: 2025-03-10

## 2025-03-10 RX ORDER — METHYLPREDNISOLONE 4 MG/1
TABLET ORAL
Qty: 21 EACH | Refills: 0 | Status: SHIPPED | OUTPATIENT
Start: 2025-03-10 | End: 2025-03-31

## 2025-03-10 RX ORDER — HYDROCODONE BITARTRATE AND ACETAMINOPHEN 7.5; 325 MG/1; MG/1
1 TABLET ORAL EVERY 6 HOURS PRN
Qty: 11 TABLET | Refills: 0 | Status: SHIPPED | OUTPATIENT
Start: 2025-03-10 | End: 2025-03-15

## 2025-03-10 RX ADMIN — KETOROLAC TROMETHAMINE 15 MG: 30 INJECTION, SOLUTION INTRAMUSCULAR; INTRAVENOUS at 08:03

## 2025-03-10 RX ADMIN — CYCLOBENZAPRINE 10 MG: 10 TABLET, FILM COATED ORAL at 08:03

## 2025-03-10 NOTE — ED PROVIDER NOTES
Encounter Date: 3/10/2025       History     Chief Complaint   Patient presents with    Neck Pain     Pt reports upper back,posterior neck pain/stiffness, n/v, getting progressively worse over the past 2 weeks. Night sweats reported as well. Went to walk in clinic yesterday prescribed muscle relaxers and pain medication without relief. Was told to come back if symptoms persist. Awake,alert,oriented.      The history is provided by the patient.   Neck Pain   This is a chronic problem. The current episode started several weeks ago. The problem occurs constantly. The problem has been unchanged. The pain is associated with lifting a heavy object. The pain is present in the generalized neck. The quality of the pain is described as aching. Pertinent negatives include no chest pain, no numbness, no headaches and no weakness.     Review of patient's allergies indicates:   Allergen Reactions    Amoxicillin Dermatitis     Other reaction(s): Rash    Penicillin      Past Medical History:   Diagnosis Date    Abnormal Pap smear of cervix      Past Surgical History:   Procedure Laterality Date    CHROMOTUBATION OF FALLOPIAN TUBE Bilateral 4/11/2024    Procedure: CHROMOTUBATION, OVIDUCT;  Surgeon: Tammy Saunders MD;  Location: [facility];  Service: OB/GYN;  Laterality: Bilateral;    CONIZATION OF CERVIX USING LOOP ELECTROSURGICAL EXCISION PROCEDURE (LEEP) N/A 4/11/2024    Procedure: LEEP CONIZATION, CERVIX;  Surgeon: Tammy Saunders MD;  Location: [facility] OR;  Service: OB/GYN;  Laterality: N/A;    CYST REMOVAL      On right foot    CYSTOSCOPY N/A 4/11/2024    Procedure: CYSTOSCOPY;  Surgeon: Tammy Saunders MD;  Location: [facility] OR;  Service: OB/GYN;  Laterality: N/A;    DIAGNOSTIC LAPAROSCOPY N/A 4/11/2024    Procedure: LAPAROSCOPY, DIAGNOSTIC;  Surgeon: Tammy Saunders MD;  Location: [facility] OR;  Service: OB/GYN;  Laterality: N/A;  2 cameras, 2 light cords  Top & bottom @same time    HYSTEROSCOPY WITH DILATION AND CURETTAGE OF UTERUS  N/A 4/11/2024    Procedure: HYSTEROSCOPY, WITH DILATION AND CURETTAGE OF UTERUS;  Surgeon: Tammy Saunders MD;  Location: [facility] OR;  Service: OB/GYN;  Laterality: N/A;    REPAIR, HERNIA, UMBILICAL N/A 7/26/2024    Procedure: REPAIR, HERNIA, UMBILICAL;  Surgeon: Grant Wallace Jr., MD;  Location: [facility] OR;  Service: General;  Laterality: N/A;     Family History   Problem Relation Name Age of Onset    No Known Problems Mother      No Known Problems Father      Diabetes Maternal Aunt      Ovarian cancer Maternal Grandmother      Ovarian cancer Paternal Grandmother       Social History[1]  Review of Systems   Constitutional:  Negative for fever.   HENT: Negative.  Negative for congestion and sore throat.    Eyes: Negative.    Respiratory: Negative.  Negative for cough and shortness of breath.    Cardiovascular:  Negative for chest pain.   Gastrointestinal:  Negative for abdominal pain, nausea and vomiting.   Genitourinary:  Negative for dysuria.   Musculoskeletal:  Positive for neck pain.   Neurological:  Negative for weakness, numbness and headaches.   Psychiatric/Behavioral:  Negative for confusion.        Physical Exam     Initial Vitals [03/10/25 0809]   BP Pulse Resp Temp SpO2   118/73 71 18 98.6 °F (37 °C) 99 %      MAP       --         Physical Exam    Constitutional: She appears well-developed and well-nourished. No distress.   HENT:   Head: Normocephalic and atraumatic.   Eyes: Conjunctivae are normal. Pupils are equal, round, and reactive to light.   Neck: Neck supple.   Normal range of motion.  Cardiovascular:  Normal rate, regular rhythm and normal heart sounds.           Pulmonary/Chest: Breath sounds normal.   Abdominal: Abdomen is soft. Bowel sounds are normal. She exhibits no distension. There is no abdominal tenderness. There is no rebound.   Musculoskeletal:         General: No edema. Normal range of motion.      Cervical back: Normal range of motion and neck supple. Spasms present.      Thoracic  back: Spasms present.      Lumbar back: Spasms present.     Neurological: She is alert and oriented to person, place, and time. She has normal strength.   Skin: Skin is warm and dry.   Psychiatric: She has a normal mood and affect.         ED Course   Procedures  Labs Reviewed   POCT URINE PREGNANCY       Result Value    POC Preg Test, Ur Negative       Acceptable Yes            Imaging Results              X-Ray Cervical Spine AP And Lateral (Final result)  Result time 03/10/25 09:55:39      Final result by Dennys Ross MD (03/10/25 09:55:39)                   Narrative:    EXAMINATION  XR CERVICAL SPINE AP LATERAL    CLINICAL HISTORY  pain cer;    TECHNIQUE  A total of 3 images submitted of the cervical spine.    COMPARISON  None available at the time of initial interpretation.    FINDINGS  Exam quality: adequately visualized through C7    Vertebral segments: No convincing acute cortical displacement or compression deformity. Disc spaces are unremarkable. No evidence of traumatic subluxation. The C1 lateral masses are symmetrically aligned on C2.    Curvature: Straightening of the normal lordosis is evident. There is no abnormal lateral curvature.    Soft tissues: No acute or focal abnormality. There is no thickening of the prevertebral tissues.    IMPRESSION  1. No evidence of displaced fracture or traumatic malalignment.  2. Straightening of the spine may be positional and/or due to element of muscle spasm.  3. Nonacute/secondary details discussed above.      Electronically signed by: Dennys Ross  Date:    03/10/2025  Time:    09:55                                     X-Ray Thoracic Spine AP Lateral (Final result)  Result time 03/10/25 09:56:01      Final result by Dennys Ross MD (03/10/25 09:56:01)                   Narrative:    EXAMINATION  XR THORACIC SPINE AP LATERAL    CLINICAL HISTORY  Pain Thoracic Spine (724.1);    TECHNIQUE  A total of 3 images submitted of the thoracic  spine.    COMPARISON  None available at the time of initial interpretation.    FINDINGS  Vertebral segments: No acute cortical displacement, compression deformity, or traumatic listhesis. Intervertebral spaces are unremarkable.    Curvature: No grossly abnormal curvature is identified.    Soft tissues: No acute or focal abnormality.    IMPRESSION  No convincing acute abnormality.      Electronically signed by: Dennys Ross  Date:    03/10/2025  Time:    09:56                                     Medications   cyclobenzaprine tablet 10 mg (10 mg Oral Given 3/10/25 0829)   HYDROcodone-acetaminophen  mg per tablet 1 tablet (1 tablet Oral Not Given 3/10/25 0915)   ketorolac injection 15 mg (15 mg Intramuscular Given 3/10/25 0829)     Medical Decision Making  DDx: neck pain, muscle spasms    Amount and/or Complexity of Data Reviewed  Labs: ordered.  Radiology: ordered.     Details: negative  Discussion of management or test interpretation with external provider(s): Feeling better after treatment in the department.  Will refer to PT.    Risk  Prescription drug management.                                      Clinical Impression:  Final diagnoses:  [S16.1XXA] Strain of neck muscle, initial encounter (Primary)  [M62.838] Muscle spasms of neck          ED Disposition Condition    Discharge Stable          ED Prescriptions       Medication Sig Dispense Start Date End Date Auth. Provider    HYDROcodone-acetaminophen (NORCO) 7.5-325 mg per tablet Take 1 tablet by mouth every 6 (six) hours as needed for Pain. 11 tablet 3/10/2025 3/15/2025 Boston Tobar MD    methylPREDNISolone (MEDROL DOSEPACK) 4 mg tablet use as directed 21 each 3/10/2025 3/31/2025 Boston Tobar MD          Follow-up Information       Follow up With Specialties Details Why Contact Info    Mena Rodriguez NP Urgent Care, Emergency Medicine, Family Medicine   [address] [place] [id number] [phone]                 [1]   Social  History  Tobacco Use    Smoking status: Never     Passive exposure: Never    Smokeless tobacco: Never   Substance Use Topics    Alcohol use: Not Currently     Comment: Occasionally    Drug use: Yes     Types: Marijuana     Comment: Boston Desai MD  03/10/25 1041

## 2025-03-10 NOTE — Clinical Note
"Regina Horton (Bre) was seen and treated in our emergency department on 3/10/2025.  She may return to work on 03/13/2025.       If you have any questions or concerns, please don't hesitate to call.       RN    "

## 2025-04-06 ENCOUNTER — OFFICE VISIT (OUTPATIENT)
Dept: URGENT CARE | Facility: CLINIC | Age: 30
End: 2025-04-06
Payer: COMMERCIAL

## 2025-04-06 VITALS
SYSTOLIC BLOOD PRESSURE: 107 MMHG | HEART RATE: 69 BPM | TEMPERATURE: 98 F | HEIGHT: 67 IN | WEIGHT: 168 LBS | RESPIRATION RATE: 16 BRPM | DIASTOLIC BLOOD PRESSURE: 73 MMHG | BODY MASS INDEX: 26.37 KG/M2 | OXYGEN SATURATION: 100 %

## 2025-04-06 DIAGNOSIS — R11.2 NAUSEA AND VOMITING, UNSPECIFIED VOMITING TYPE: Primary | ICD-10-CM

## 2025-04-06 DIAGNOSIS — K52.9 GASTROENTERITIS: ICD-10-CM

## 2025-04-06 PROCEDURE — 99213 OFFICE O/P EST LOW 20 MIN: CPT | Mod: ,,, | Performed by: NURSE PRACTITIONER

## 2025-04-06 RX ORDER — ONDANSETRON 8 MG/1
8 TABLET, ORALLY DISINTEGRATING ORAL
Status: COMPLETED | OUTPATIENT
Start: 2025-04-06 | End: 2025-04-06

## 2025-04-06 RX ORDER — HYOSCYAMINE SULFATE 0.125 MG
125 TABLET ORAL EVERY 6 HOURS PRN
Qty: 30 TABLET | Refills: 0 | Status: SHIPPED | OUTPATIENT
Start: 2025-04-06

## 2025-04-06 RX ORDER — ONDANSETRON 4 MG/1
4 TABLET, ORALLY DISINTEGRATING ORAL EVERY 6 HOURS PRN
Qty: 15 TABLET | Refills: 0 | Status: SHIPPED | OUTPATIENT
Start: 2025-04-06

## 2025-04-06 RX ORDER — PROMETHAZINE HYDROCHLORIDE 25 MG/1
12.5 TABLET ORAL EVERY 6 HOURS PRN
Qty: 15 TABLET | Refills: 0 | Status: SHIPPED | OUTPATIENT
Start: 2025-04-06 | End: 2025-04-14

## 2025-04-06 RX ADMIN — ONDANSETRON 8 MG: 8 TABLET, ORALLY DISINTEGRATING ORAL at 08:04

## 2025-04-06 NOTE — PROGRESS NOTES
"Subjective:      Patient ID: eRgina Horton is a 29 y.o. female.    Vitals:  height is 5' 7" (1.702 m) and weight is 76.2 kg (168 lb). Her temperature is 98.1 °F (36.7 °C). Her blood pressure is 107/73 and her pulse is 69. Her respiration is 16 and oxygen saturation is 100%.     Chief Complaint: Nausea    Patient is a 29 y.o. female who presents to urgent care with complaints of  nausea, vomiting, abdominal pain x 2 day onset. Reports approximately 3 episodes of vomiting, (-) diarrhea. Alleviating factors include zofran 8mg x Friday and Saturday without  relief. Attributes to likely food poisoning--(oysters).       Nausea  Associated symptoms include nausea. Pertinent negatives include no chills, fatigue, fever, headaches, myalgias, sore throat or vomiting.       Constitution: Negative for chills, fatigue and fever.   HENT: Negative.  Negative for sinus pain and sore throat.    Cardiovascular: Negative.    Eyes: Negative.    Gastrointestinal:  Positive for abdominal bloating and nausea. Negative for vomiting, diarrhea, bright red blood in stool, rectal bleeding and heartburn.   Endocrine: negative.   Genitourinary:  Negative for dysuria, frequency, urgency and urine decreased.   Musculoskeletal:  Negative for muscle ache.   Neurological: Negative.  Negative for headaches.      Objective:     Physical Exam   Constitutional: She is oriented to person, place, and time. She appears well-developed. She is cooperative.  Non-toxic appearance. She does not appear ill. No distress.   HENT:   Head: Normocephalic and atraumatic.   Ears:   Right Ear: Hearing, tympanic membrane, external ear and ear canal normal.   Left Ear: Hearing, tympanic membrane, external ear and ear canal normal.   Nose: Nose normal. No mucosal edema, rhinorrhea or nasal deformity. No epistaxis. Right sinus exhibits no maxillary sinus tenderness and no frontal sinus tenderness. Left sinus exhibits no maxillary sinus tenderness and no frontal sinus " tenderness.   Mouth/Throat: Uvula is midline, oropharynx is clear and moist and mucous membranes are normal. No trismus in the jaw. Normal dentition. No uvula swelling. No oropharyngeal exudate, posterior oropharyngeal edema or posterior oropharyngeal erythema.   Eyes: Conjunctivae and lids are normal. No scleral icterus.   Neck: Trachea normal and phonation normal. Neck supple. No edema present. No erythema present. No neck rigidity present.   Cardiovascular: Normal rate, regular rhythm, normal heart sounds and normal pulses.   Pulmonary/Chest: Effort normal and breath sounds normal. No respiratory distress. She has no decreased breath sounds. She has no rhonchi.   Abdominal: Normal appearance.   Musculoskeletal: Normal range of motion.         General: No deformity. Normal range of motion.   Neurological: She is alert and oriented to person, place, and time. She exhibits normal muscle tone. Coordination normal.   Skin: Skin is warm, dry, intact, not diaphoretic and not pale.   Psychiatric: Her speech is normal and behavior is normal. Judgment and thought content normal.   Nursing note and vitals reviewed.      Assessment:     1. Nausea and vomiting, unspecified vomiting type    2. Gastroenteritis        Plan:       Nausea and vomiting, unspecified vomiting type  -     ondansetron disintegrating tablet 8 mg    Gastroenteritis    Other orders  -     hyoscyamine (ANASPAZ,LEVSIN) 0.125 mg Tab; Take 1 tablet (125 mcg total) by mouth every 6 (six) hours as needed (for spasm).  Dispense: 30 tablet; Refill: 0  -     ondansetron (ZOFRAN-ODT) 4 MG TbDL; Take 1 tablet (4 mg total) by mouth every 6 (six) hours as needed (nausea).  Dispense: 15 tablet; Refill: 0  -     promethazine (PHENERGAN) 25 MG tablet; Take 0.5 tablets (12.5 mg total) by mouth every 6 (six) hours as needed for Nausea.  Dispense: 15 tablet; Refill: 0

## 2025-04-06 NOTE — PATIENT INSTRUCTIONS
Recommend Imodium or Pepto bismol for diarrhea.   Recommend Imodium (Initial: 4 mg, followed by 2 mg after each loose stool; maximum: 16 mg/day (OTC: 8 mg/day). Limit use to <=48 hours.   Pepto-Bismol - One common side effect is your stool or your tongue turning black. This is harmless.This happens when bismuth (the active ingredient in this medicine) comes into contact with small amounts of sulphur in your saliva and digestive system. They combine to form bismuth sulfide, a black substance. As it slowly makes its way out of your body you may see black stools.This side effect usually goes away when you stop taking the medicine but it may take several days.   Peptobismol: 524 mg (15 ml) every 30 to 60 minutes or 1,050 mg (30 ml) every 60 minutes as needed for up to 2 days    Recommend simethicone for gas. It is found in Gas-ex and Mylanta.     Drink small amounts of fluid every 15 to 30 minutes. Good fluids to drink are water, broth, sports drinks, and oral electrolyte solutions (Liquid IV, Gatorade, Powerade). It is also important to eat if you are able to keep food down.  Avoid beer, wine, and mixed drinks.  Check your blood sugar more often if you have high blood sugar.  If you are throwing up, try to drink if you can until you are able to eat small amounts of food.  If you cannot keep fluids down, suck on ice chips.  If you have loose stools, try to drink extra fluids to replace the water your body has lost.  If you are breastfeeding, keep feeding your baby as you normally would.  Avoid sharing your food and drinks.  Stay away from others until the throwing up or loose stools have stopped.  Follow good hygiene practices. Wash your hands often with soap and water for at least 20 seconds. Alcohol-based hand sanitizers also work to kill the virus. This is very important:  After using the bathroom  Before eating  Before cooking  Recommend Zofran 4 mg every 6 to 8 hours prn nausea.  Recommend BRAT/[place] diet. A bland  diet is made up of foods which are soft, not spicy, cooked, low in fat, and low in fiber. These foods are not likely to upset the GI tract.  BRAT Diet = Bananas, Rice, Apples, and Toast.      Please remember that you have received care at an urgent care today. Urgent cares are not emergency rooms and are not equipped to handle life threatening emergencies and cannot rule in or out certain medical conditions and you may be released before all of your medical problems are known or treated. Please arrange follow up with your primary care physician or speciality clinic  within 2-5 days if your signs and symptoms have not resolved or worsen. Patient can call our Referral Hotline at [phone] to make an appointment.  You feel very weak, like you cant stand up, and your skin is cool, clammy, or looks blue or gray.  You have severe abdominal pain.  You have chest pain or trouble breathing.  You have signs of severe fluid loss, such as:  No urine for more than 8 hours.  You feel very lightheaded or like you are going to pass out.  You feel weak like you are going to fall.  You are not able to keep any fluids down.  You develop early signs of fluid loss again, such as:  Your urine is very dark colored.  Your mouth is dry.  You have muscle cramps.  You have a lack of energy.  You feel light-headed when you get up.

## 2025-04-06 NOTE — LETTER
April 6, 2025      [facility] [place] [address] [address] [facility] [address]  Phone: [phone]       Patient: Regina Horton   YOB: 1995  Date of Visit: 04/06/2025    To Whom It May Concern:    Aline Horton  was at [facility] on 04/06/2025.  Was diagnosed with gastroenteritis. The patient may return to work/school on 4/9/2025 with no restrictions. If you have any questions or concerns, or if I can be of further assistance, please do not hesitate to contact me.    Sincerely,    Timothy Ernst NP

## 2025-04-15 ENCOUNTER — TELEPHONE (OUTPATIENT)
Dept: GYNECOLOGY | Facility: CLINIC | Age: 30
End: 2025-04-15
Payer: COMMERCIAL

## 2025-04-15 NOTE — TELEPHONE ENCOUNTER
Made virtual with patient on May 2nd ----- Message from Zamzam Engel sent at 4/15/2025  1:05 PM CDT -----  Sure, 4 weeks  ----- Message -----  From: Kriss Moon, RN  Sent: 4/15/2025  12:26 PM CDT  To: [facility] Residents    Would y'all like me to set up next available telemed to discuss  ----- Message -----  From: Natasha Moses  Sent: 4/15/2025  11:23 AM CDT  To: [facility] Clinical Support Staff    Pt requesting a call back. Pt states every time it comes to taking the shot it makes her mood change.

## 2025-05-23 ENCOUNTER — CLINICAL SUPPORT (OUTPATIENT)
Dept: GYNECOLOGY | Facility: CLINIC | Age: 30
End: 2025-05-23
Payer: COMMERCIAL

## 2025-05-23 DIAGNOSIS — Z30.9 ENCOUNTER FOR CONTRACEPTIVE MANAGEMENT, UNSPECIFIED TYPE: Primary | ICD-10-CM

## 2025-05-23 NOTE — PROGRESS NOTES
[facility] Progress Note    Primary Site: [facility]  Patient location: Home  Physician location: In office      Chief Complaint: mood changes    HPI  Regina Horton joined me via our telemedicine platform.  Was started on Depo Provera 2 years ago which has significantly helped her dysmenorrhea. She has bleeding every 3 mo around the time her next shot is due. She reports for the past few mo she has been angry and irritable all the time, which she attributes to Depo Provera. Does not endorse SI/HI.    Discussed this is possible but not most likely given that mood changes are new and she has been on Depo Provera longer, but discussed other options for menstrual suppression, including OCPs and IUD- recommend IUD as it works more locally and she says she can't take a pill everyday.     Otherwise, she has been seen at [facility] but also at [facility] Dr Cha for cervical dysplasia- had sought second opinion there. Now s/p colposcopy with Dr Cha 4/11/2025 in which ECC was done w no cervical bx- pathology returned benign w rec for f/up pap in 6mo.    I have reviewed family history, social history, medications and allergies as documented in the patient's electronic medical record.      Reports, labs, outside records, and images have been reviewed with pertinent interpretations below. See telemedicine notes records for intervening communications.    Assessment/Plan  Problem List Items Addressed This Visit    None  Visit Diagnoses         Encounter for contraceptive management, unspecified type    -  Primary          She desires to review options and think about it independently. If she decides she wants IUD placement, she will call clinic back to make an appt, otherwise she will f/up in October as already scheduled. She also would like to have her 6mo pap done here at [facility] at that visit.    See correspondence above for plan.   Patient's needs assessed and health education provided. Patient understands risks,  benefits, and alternatives of treatment prescribed above. Patient verbalizes understanding and agrees to follow plan.    Patient's identity was confirmed and confidentiality/privacy confirmed prior to visit. I certify that this visit was done via secure two-way transmission with informed consent of the patient and/or guardian.  Each patient to whom I provide medical services by telemedicine is:  (1) informed of the relationship between the physician and patient and the respective role of any other health care provider with respect to management of the patient; and (2) notified that they may decline to receive medical services by telemedicine and may withdraw from such care at any time. Patient verbally consented to receive this service via voice-only telephone call.    Audio only was selected because there was no capability for video conferencing with patient.    Trisha Hill MD  [employer] [facility] PGY4

## 2025-06-01 ENCOUNTER — OFFICE VISIT (OUTPATIENT)
Dept: URGENT CARE | Facility: CLINIC | Age: 30
End: 2025-06-01
Payer: COMMERCIAL

## 2025-06-01 VITALS
BODY MASS INDEX: 26.37 KG/M2 | SYSTOLIC BLOOD PRESSURE: 101 MMHG | HEART RATE: 81 BPM | OXYGEN SATURATION: 99 % | RESPIRATION RATE: 16 BRPM | TEMPERATURE: 99 F | HEIGHT: 67 IN | DIASTOLIC BLOOD PRESSURE: 72 MMHG | WEIGHT: 168 LBS

## 2025-06-01 DIAGNOSIS — A08.4 VIRAL GASTROENTERITIS: Primary | ICD-10-CM

## 2025-06-01 DIAGNOSIS — R11.0 NAUSEA: ICD-10-CM

## 2025-06-01 PROCEDURE — 99214 OFFICE O/P EST MOD 30 MIN: CPT | Mod: ,,, | Performed by: FAMILY MEDICINE

## 2025-06-01 RX ORDER — ONDANSETRON 8 MG/1
8 TABLET, ORALLY DISINTEGRATING ORAL
Status: COMPLETED | OUTPATIENT
Start: 2025-06-01 | End: 2025-06-01

## 2025-06-01 RX ORDER — ONDANSETRON 8 MG/1
8 TABLET, ORALLY DISINTEGRATING ORAL EVERY 8 HOURS PRN
Qty: 10 TABLET | Refills: 0 | Status: SHIPPED | OUTPATIENT
Start: 2025-06-01

## 2025-06-01 RX ADMIN — ONDANSETRON 8 MG: 8 TABLET, ORALLY DISINTEGRATING ORAL at 09:06

## 2025-06-10 ENCOUNTER — OFFICE VISIT (OUTPATIENT)
Dept: URGENT CARE | Facility: CLINIC | Age: 30
End: 2025-06-10
Payer: COMMERCIAL

## 2025-06-10 VITALS
HEART RATE: 79 BPM | TEMPERATURE: 100 F | RESPIRATION RATE: 18 BRPM | OXYGEN SATURATION: 98 % | WEIGHT: 160 LBS | SYSTOLIC BLOOD PRESSURE: 103 MMHG | BODY MASS INDEX: 25.11 KG/M2 | HEIGHT: 67 IN | DIASTOLIC BLOOD PRESSURE: 72 MMHG

## 2025-06-10 DIAGNOSIS — J02.9 SORE THROAT: Primary | ICD-10-CM

## 2025-06-10 DIAGNOSIS — J32.9 SINUSITIS, UNSPECIFIED CHRONICITY, UNSPECIFIED LOCATION: ICD-10-CM

## 2025-06-10 LAB
CTP QC/QA: YES
MOLECULAR STREP A: NEGATIVE
POC MOLECULAR INFLUENZA A AGN: NEGATIVE
POC MOLECULAR INFLUENZA B AGN: NEGATIVE
SARS-COV+SARS-COV-2 AG RESP QL IA.RAPID: NEGATIVE

## 2025-06-10 PROCEDURE — 87651 STREP A DNA AMP PROBE: CPT | Mod: QW,,, | Performed by: FAMILY MEDICINE

## 2025-06-10 PROCEDURE — 99213 OFFICE O/P EST LOW 20 MIN: CPT | Mod: 25,,, | Performed by: FAMILY MEDICINE

## 2025-06-10 PROCEDURE — 87502 INFLUENZA DNA AMP PROBE: CPT | Mod: QW,,, | Performed by: FAMILY MEDICINE

## 2025-06-10 PROCEDURE — 96372 THER/PROPH/DIAG INJ SC/IM: CPT | Mod: ,,, | Performed by: FAMILY MEDICINE

## 2025-06-10 PROCEDURE — 87811 SARS-COV-2 COVID19 W/OPTIC: CPT | Mod: QW,,, | Performed by: FAMILY MEDICINE

## 2025-06-10 RX ORDER — BETAMETHASONE SODIUM PHOSPHATE AND BETAMETHASONE ACETATE 3; 3 MG/ML; MG/ML
9 INJECTION, SUSPENSION INTRA-ARTICULAR; INTRALESIONAL; INTRAMUSCULAR; SOFT TISSUE
Status: COMPLETED | OUTPATIENT
Start: 2025-06-10 | End: 2025-06-10

## 2025-06-10 RX ORDER — DOXYCYCLINE 100 MG/1
100 CAPSULE ORAL EVERY 12 HOURS
Qty: 14 CAPSULE | Refills: 0 | Status: SHIPPED | OUTPATIENT
Start: 2025-06-10 | End: 2025-06-17

## 2025-06-10 RX ADMIN — BETAMETHASONE SODIUM PHOSPHATE AND BETAMETHASONE ACETATE 9 MG: 3; 3 INJECTION, SUSPENSION INTRA-ARTICULAR; INTRALESIONAL; INTRAMUSCULAR; SOFT TISSUE at 07:06

## 2025-06-10 NOTE — PROGRESS NOTES
Patient ID: Regina Horton is a 29 y.o. female.  Chief Complaint: Sinus Problem    HPI:   Patient presents here today for above reason.        Patient is a 29 y.o. female who presents to urgent care with complaints of coughing (with lower back/pain level is at a 9 with coughing), HA, nasal congestion, loss of appetite, sore throat  x 3  days. Alleviating factors include Nyquil/Dayquil, Claritin  with mild amount of relief.       Past Medical History:  Past Medical History:   Diagnosis Date    Abnormal Pap smear of cervix      Past Surgical History:   Procedure Laterality Date    CHROMOTUBATION OF FALLOPIAN TUBE Bilateral 4/11/2024    Procedure: CHROMOTUBATION, OVIDUCT;  Surgeon: Tammy Saunders MD;  Location: [facility];  Service: OB/GYN;  Laterality: Bilateral;    CONIZATION OF CERVIX USING LOOP ELECTROSURGICAL EXCISION PROCEDURE (LEEP) N/A 4/11/2024    Procedure: LEEP CONIZATION, CERVIX;  Surgeon: Tammy Saunders MD;  Location: [facility];  Service: OB/GYN;  Laterality: N/A;    CYST REMOVAL      On right foot    CYSTOSCOPY N/A 4/11/2024    Procedure: CYSTOSCOPY;  Surgeon: Tammy Saunders MD;  Location: [facility];  Service: OB/GYN;  Laterality: N/A;    DIAGNOSTIC LAPAROSCOPY N/A 4/11/2024    Procedure: LAPAROSCOPY, DIAGNOSTIC;  Surgeon: Tammy Saunders MD;  Location: [facility];  Service: OB/GYN;  Laterality: N/A;  2 cameras, 2 light cords  Top & bottom @same time    HYSTEROSCOPY WITH DILATION AND CURETTAGE OF UTERUS N/A 4/11/2024    Procedure: HYSTEROSCOPY, WITH DILATION AND CURETTAGE OF UTERUS;  Surgeon: Tammy Saunders MD;  Location: [facility];  Service: OB/GYN;  Laterality: N/A;    REPAIR, HERNIA, UMBILICAL N/A 7/26/2024    Procedure: REPAIR, HERNIA, UMBILICAL;  Surgeon: Grant Wallace Jr., MD;  Location: [facility];  Service: General;  Laterality: N/A;     Review of patient's allergies indicates:   Allergen Reactions    Amoxicillin Dermatitis     Other reaction(s): Rash    Penicillin      Current Outpatient  "Medications   Medication Instructions    doxycycline (MONODOX) 100 mg, Oral, Every 12 hours    ondansetron (ZOFRAN-ODT) 8 mg, Oral, Every 8 hours PRN     Social History[1]    ROS:   Review of Systems  12 point review of systems conducted, negative except as stated in the history of present illness. See HPI for details.   Vitals/PE:   Visit Vitals  /72   Pulse 79   Temp 99.8 °F (37.7 °C) (Oral)   Resp 18   Ht 5' 7" (1.702 m)   Wt 72.6 kg (160 lb)   SpO2 98%   BMI 25.06 kg/m²     Physical Exam  Vitals and nursing note reviewed.   Constitutional:       Appearance: She is ill-appearing. She is not toxic-appearing or diaphoretic.   HENT:      Right Ear: Tympanic membrane normal.      Left Ear: Tympanic membrane normal.      Nose: Mucosal edema and congestion present.      Right Turbinates: Enlarged and swollen.      Left Turbinates: Enlarged and swollen.      Right Sinus: Maxillary sinus tenderness and frontal sinus tenderness present.      Left Sinus: Maxillary sinus tenderness and frontal sinus tenderness present.      Mouth/Throat:      Pharynx: Posterior oropharyngeal erythema present.   Eyes:      Pupils: Pupils are equal, round, and reactive to light.   Cardiovascular:      Rate and Rhythm: Normal rate.      Pulses: Normal pulses.   Pulmonary:      Effort: Pulmonary effort is normal.   Skin:     General: Skin is warm.      Capillary Refill: Capillary refill takes less than 2 seconds.   Neurological:      General: No focal deficit present.      Mental Status: She is alert and oriented to person, place, and time.   Psychiatric:         Mood and Affect: Mood normal.         Results for orders placed or performed in visit on 06/10/25   POCT Strep A, Molecular    Collection Time: 06/10/25  6:58 PM   Result Value Ref Range    Molecular Strep A, POC Negative Negative     Acceptable Yes      Assessment/Plan:   Sore throat  -     POCT Influenza A/B Molecular  -     SARS Coronavirus 2 Antigen, POCT " "Manual Read  -     POCT Strep A, Molecular  All testing negative in clinic today.  Sinusitis, unspecified chronicity, unspecified location  -     betamethasone acetate-betamethasone sodium phosphate injection 9 mg  -     doxycycline (MONODOX) 100 MG capsule; Take 1 capsule (100 mg total) by mouth every 12 (twelve) hours. for 7 days  Dispense: 14 capsule; Refill: 0     Sinusitis   If your condition worsens or fails to improve we recommend that you receive another evaluation at the ER immediately or contact your PCP to discuss your concerns or return here. You must understand that you've received an urgent care treatment only and that you may be released before all your medical problems are known or treated. You the patient will arrange for followup care as instructed.   If we discussed that I think your illness is viral it will not respond to antibiotics and it will last 10-14 days. However, if over the next few days the symptoms worsen start the antibiotics I have given you.   If we discussed that you require antibiotics start them now and take them to completion.   If you are female and on BCP and do take the antibiotics, use additional methods to prevent pregnancy while on the antibiotics and for one cycle after.   Flonase (fluticasone) is a nasal spray which is available over the counter and may help with your symptoms, Asterpro (Azelastine) is a nasal antihistamine that can also be taken    Zyrtec D, Claritin D or allegra D can also help with symptoms of congestion and drainage.   If you have hypertension avoid using the "D" which is the decongestant   If you just have drainage you can take plain zyrtec, claritin or allegra   If you just have a congested feeling you can take pseudoephedrine (unless you have high blood pressure) which you have to sign for behind the counter. Do not buy the phenylephrine which is on the shelf as it is not effective   Rest and fluids are also important.   Tylenol or ibuprofen can " also be used as directed for pain unless you have an allergy to them or medical condition such as stomach ulcers, kidney or liver disease or blood thinners etc for which you should not be taking these type of medications.   If you are flying in the next few days Afrin nose drops for the airplane flight upon take off and landing may help. Other than at those times refrain from using afrin.   If you were prescribed a narcotic do not drive or operate heavy machinery while taking these medications.    Orders Placed This Encounter   Procedures    POCT Influenza A/B Molecular    SARS Coronavirus 2 Antigen, POCT Manual Read    POCT Strep A, Molecular       Education and counseling done face to face regarding medical conditions and plan. Contact office if new symptoms develop. Should any symptoms ever significantly worsen seek emergency medical attention/go to ER. Follow up at least yearly for wellness or sooner PRN. Nurse to call patient with any results. The patient is receptive, expresses understanding and is agreeable to plan. All questions have been answered.             [1]   Social History  Socioeconomic History    Marital status: Single   Tobacco Use    Smoking status: Never     Passive exposure: Never    Smokeless tobacco: Never   Substance and Sexual Activity    Alcohol use: Yes     Alcohol/week: 2.0 standard drinks of alcohol     Types: 2 Glasses of wine per week     Comment: occasional    Drug use: Yes     Types: Marijuana     Comment: occ    Sexual activity: Not Currently     Partners: Male     Birth control/protection: None   Social History Narrative    ** Merged History Encounter **

## 2025-06-11 NOTE — PATIENT INSTRUCTIONS
"  Assessment/Plan:   Sore throat  -     POCT Influenza A/B Molecular  -     SARS Coronavirus 2 Antigen, POCT Manual Read  -     POCT Strep A, Molecular  All testing negative in clinic today.  Sinusitis, unspecified chronicity, unspecified location  -     betamethasone acetate-betamethasone sodium phosphate injection 9 mg  -     doxycycline (MONODOX) 100 MG capsule; Take 1 capsule (100 mg total) by mouth every 12 (twelve) hours. for 7 days  Dispense: 14 capsule; Refill: 0     Sinusitis   If your condition worsens or fails to improve we recommend that you receive another evaluation at the ER immediately or contact your PCP to discuss your concerns or return here. You must understand that you've received an urgent care treatment only and that you may be released before all your medical problems are known or treated. You the patient will arrange for followup care as instructed.   If we discussed that I think your illness is viral it will not respond to antibiotics and it will last 10-14 days. However, if over the next few days the symptoms worsen start the antibiotics I have given you.   If we discussed that you require antibiotics start them now and take them to completion.   If you are female and on BCP and do take the antibiotics, use additional methods to prevent pregnancy while on the antibiotics and for one cycle after.   Flonase (fluticasone) is a nasal spray which is available over the counter and may help with your symptoms, Asterpro (Azelastine) is a nasal antihistamine that can also be taken    Zyrtec D, Claritin D or allegra D can also help with symptoms of congestion and drainage.   If you have hypertension avoid using the "D" which is the decongestant   If you just have drainage you can take plain zyrtec, claritin or allegra   If you just have a congested feeling you can take pseudoephedrine (unless you have high blood pressure) which you have to sign for behind the counter. Do not buy the phenylephrine " which is on the shelf as it is not effective   Rest and fluids are also important.   Tylenol or ibuprofen can also be used as directed for pain unless you have an allergy to them or medical condition such as stomach ulcers, kidney or liver disease or blood thinners etc for which you should not be taking these type of medications.   If you are flying in the next few days Afrin nose drops for the airplane flight upon take off and landing may help. Other than at those times refrain from using afrin.   If you were prescribed a narcotic do not drive or operate heavy machinery while taking these medications.    Orders Placed This Encounter   Procedures    POCT Influenza A/B Molecular    SARS Coronavirus 2 Antigen, POCT Manual Read    POCT Strep A, Molecular       Education and counseling done face to face regarding medical conditions and plan. Contact office if new symptoms develop. Should any symptoms ever significantly worsen seek emergency medical attention/go to ER. Follow up at least yearly for wellness or sooner PRN. Nurse to call patient with any results. The patient is receptive, expresses understanding and is agreeable to plan. All questions have been answered.

## 2025-06-17 ENCOUNTER — HOSPITAL ENCOUNTER (EMERGENCY)
Facility: HOSPITAL | Age: 30
Discharge: HOME | End: 2025-06-17
Attending: EMERGENCY MEDICINE
Payer: COMMERCIAL

## 2025-06-17 VITALS
TEMPERATURE: 99 F | SYSTOLIC BLOOD PRESSURE: 101 MMHG | HEIGHT: 67 IN | RESPIRATION RATE: 18 BRPM | WEIGHT: 160 LBS | BODY MASS INDEX: 25.11 KG/M2 | HEART RATE: 75 BPM | OXYGEN SATURATION: 100 % | DIASTOLIC BLOOD PRESSURE: 66 MMHG

## 2025-06-17 DIAGNOSIS — J06.9 UPPER RESPIRATORY TRACT INFECTION, UNSPECIFIED TYPE: Primary | ICD-10-CM

## 2025-06-17 DIAGNOSIS — R05.9 COUGH: ICD-10-CM

## 2025-06-17 LAB
B-HCG UR QL: NEGATIVE
CTP QC/QA: YES

## 2025-06-17 PROCEDURE — 99284 EMERGENCY DEPT VISIT MOD MDM: CPT | Mod: 25

## 2025-06-17 PROCEDURE — 25000003 PHARM REV CODE 250: Performed by: NURSE PRACTITIONER

## 2025-06-17 PROCEDURE — 81025 URINE PREGNANCY TEST: CPT | Performed by: NURSE PRACTITIONER

## 2025-06-17 RX ORDER — PROMETHAZINE HYDROCHLORIDE AND DEXTROMETHORPHAN HYDROBROMIDE 6.25; 15 MG/5ML; MG/5ML
5 SYRUP ORAL EVERY 6 HOURS PRN
Qty: 100 ML | Refills: 0 | Status: SHIPPED | OUTPATIENT
Start: 2025-06-17 | End: 2025-06-22

## 2025-06-17 RX ORDER — ALBUTEROL SULFATE 90 UG/1
1-2 INHALANT RESPIRATORY (INHALATION) EVERY 6 HOURS PRN
Qty: 8 G | Refills: 0 | Status: SHIPPED | OUTPATIENT
Start: 2025-06-17

## 2025-06-17 RX ORDER — METHYLPREDNISOLONE 4 MG/1
TABLET ORAL
Qty: 21 EACH | Refills: 0 | Status: SHIPPED | OUTPATIENT
Start: 2025-06-17

## 2025-06-17 RX ORDER — GUAIFENESIN AND DEXTROMETHORPHAN HYDROBROMIDE 10; 100 MG/5ML; MG/5ML
10 SYRUP ORAL
Status: COMPLETED | OUTPATIENT
Start: 2025-06-17 | End: 2025-06-17

## 2025-06-17 RX ORDER — CETIRIZINE HYDROCHLORIDE 10 MG/1
10 TABLET ORAL DAILY
Qty: 14 TABLET | Refills: 0 | Status: SHIPPED | OUTPATIENT
Start: 2025-06-17 | End: 2025-07-01

## 2025-06-17 RX ADMIN — GUAIFENESIN AND DEXTROMETHORPHAN 10 ML: 100; 10 SYRUP ORAL at 10:06

## 2025-06-17 NOTE — ED PROVIDER NOTES
Encounter Date: 6/17/2025       History     Chief Complaint   Patient presents with    Cough     Productive cough, sinus congestion, chest pain with inhaling and coughing x 2-3 weeks. Seen at urgent care on 6/10. Completed round of antibiotics. Symptoms persist.     Patient is a 29-year-old female who presents for evaluation after experiencing persistent cough for the last 2-3 weeks.  Reports being seen at a local urgent clinic on 06/10 and was placed on doxycycline for suspected infection.  The patient denies relief in symptoms despite taking the antibiotic as prescribed.  Denies being placed on any medication for symptom relief at that visit.  Reports mild bilateral rib pain secondary to cough.  Denies shortness of breath, abdominal pain, nausea/vomiting, or loss of bowel or bladder control.      Review of patient's allergies indicates:   Allergen Reactions    Amoxicillin Dermatitis     Other reaction(s): Rash    Penicillin      Past Medical History:   Diagnosis Date    Abnormal Pap smear of cervix      Past Surgical History:   Procedure Laterality Date    CHROMOTUBATION OF FALLOPIAN TUBE Bilateral 4/11/2024    Procedure: CHROMOTUBATION, OVIDUCT;  Surgeon: Tammy Saunders MD;  Location: [facility];  Service: OB/GYN;  Laterality: Bilateral;    CONIZATION OF CERVIX USING LOOP ELECTROSURGICAL EXCISION PROCEDURE (LEEP) N/A 4/11/2024    Procedure: LEEP CONIZATION, CERVIX;  Surgeon: Tammy Saunders MD;  Location: [facility];  Service: OB/GYN;  Laterality: N/A;    CYST REMOVAL      On right foot    CYSTOSCOPY N/A 4/11/2024    Procedure: CYSTOSCOPY;  Surgeon: Tammy Saunders MD;  Location: [facility] OR;  Service: OB/GYN;  Laterality: N/A;    DIAGNOSTIC LAPAROSCOPY N/A 4/11/2024    Procedure: LAPAROSCOPY, DIAGNOSTIC;  Surgeon: Tammy Saunders MD;  Location: [facility] OR;  Service: OB/GYN;  Laterality: N/A;  2 cameras, 2 light cords  Top & bottom @same time    HYSTEROSCOPY WITH DILATION AND CURETTAGE OF UTERUS N/A 4/11/2024     Procedure: HYSTEROSCOPY, WITH DILATION AND CURETTAGE OF UTERUS;  Surgeon: Tammy Saunders MD;  Location: [facility] OR;  Service: OB/GYN;  Laterality: N/A;    REPAIR, HERNIA, UMBILICAL N/A 7/26/2024    Procedure: REPAIR, HERNIA, UMBILICAL;  Surgeon: Grant Wallace Jr., MD;  Location: [facility] OR;  Service: General;  Laterality: N/A;     Family History   Problem Relation Name Age of Onset    No Known Problems Mother      No Known Problems Father      Diabetes Maternal Aunt      Ovarian cancer Maternal Grandmother      Ovarian cancer Paternal Grandmother       Social History[1]  Review of Systems   Constitutional:  Negative for chills, diaphoresis, fatigue and fever.   HENT:  Positive for rhinorrhea. Negative for facial swelling, sinus pressure, sinus pain, sore throat and trouble swallowing.    Respiratory:  Positive for cough. Negative for chest tightness, shortness of breath and wheezing.    Cardiovascular:  Negative for chest pain, palpitations and leg swelling.   Gastrointestinal:  Negative for abdominal pain, diarrhea, nausea and vomiting.   Genitourinary:  Negative for dysuria, flank pain, frequency, hematuria and urgency.   Musculoskeletal:  Negative for arthralgias, back pain, joint swelling and myalgias.   Skin:  Negative for color change and rash.   Neurological:  Negative for dizziness, syncope, weakness and light-headedness.   Hematological:  Does not bruise/bleed easily.   All other systems reviewed and are negative.      Physical Exam     Initial Vitals [06/17/25 0910]   BP Pulse Resp Temp SpO2   118/84 72 20 98.6 °F (37 °C) 100 %      MAP       --         Physical Exam    Nursing note and vitals reviewed.  Constitutional: She appears well-developed and well-nourished.   HENT:   Head: Normocephalic and atraumatic.   Nose: Mucosal edema and rhinorrhea present. Mouth/Throat: Oropharynx is clear and moist.   Eyes: Conjunctivae and EOM are normal. Pupils are equal, round, and reactive to light.   Neck: Neck  supple.   Normal range of motion.  Cardiovascular:  Normal rate, regular rhythm, normal heart sounds and intact distal pulses.           Pulmonary/Chest: Effort normal and breath sounds normal. No respiratory distress. She has no wheezes. She has no rhonchi. She has no rales. She exhibits no tenderness.   Dry cough present.     Abdominal: Abdomen is soft and flat. Bowel sounds are normal. She exhibits no distension. There is no abdominal tenderness. There is no rebound, no guarding, no tenderness at McBurney's point and negative Perez's sign. negative psoas sign  Musculoskeletal:         General: Normal range of motion.      Cervical back: Normal range of motion and neck supple.     Neurological: She is alert and oriented to person, place, and time. She has normal strength and normal reflexes.   Skin: Skin is warm and dry. Capillary refill takes less than 2 seconds.   Psychiatric: She has a normal mood and affect. Her speech is normal and behavior is normal. Judgment and thought content normal.         ED Course   Procedures  Labs Reviewed   POCT URINE PREGNANCY       Result Value    POC Preg Test, Ur Negative       Acceptable Yes            Imaging Results              X-Ray Chest 1 View (Final result)  Result time 06/17/25 10:49:39      Final result by Moisés Mendoza MD (06/17/25 10:49:39)                   Impression:      Mildly increased perihilar peribronchial interstitial markings, suggestive of viral respiratory illness or reactive airway disease.  No focal consolidation.      Electronically signed by: Moisés Mendoza  Date:    06/17/2025  Time:    10:49               Narrative:    EXAMINATION:  XR CHEST 1 VIEW    CLINICAL HISTORY:  Cough, unspecified    TECHNIQUE:  PA and lateral views of the chest were performed.    COMPARISON:  None    FINDINGS:  The cardiomediastinal silhouette is within normal limits in size and configuration.  Mildly increased perihilar peribronchial interstitial  markings with no evidence of focal consolidation, pleural effusion, or pneumothorax.  Osseous structures are intact.                                       Medications   dextromethorphan-guaiFENesin  mg/5 ml liquid 10 mL (10 mLs Oral Given 6/17/25 1005)     Medical Decision Making  Differential:  URI  Bronchitis  Pneumonia      Amount and/or Complexity of Data Reviewed  Labs: ordered.  Radiology: ordered.    Risk  OTC drugs.               ED Course as of 06/17/25 1102   Tue Jun 17, 2025   1057 Given strict ED return precautions. I have spoken with the patient and/or caregivers. I have explained the patient's condition, diagnoses and treatment plan based on the information available to me at this time. I have answered the patient's and/or caregiver's questions and addressed any concerns. The patient and/or caregivers have as good an understanding of the patient's diagnosis, condition and treatment plan as can be expected at this point. The vital signs have been stable. The patient's condition is stable and appropriate for discharge from the emergency department.      The patient will pursue further outpatient evaluation with the primary care physician or other designated or consulting physician as outlined in the discharge instructions. The patient and/or caregivers are agreeable to this plan of care and follow-up instructions have been explained in detail. The patient and/or caregivers have received these instructions in written format and have expressed an understanding of the discharge instructions. The patient and/or caregivers are aware that any significant change in condition or worsening of symptoms should prompt an immediate return to this or the closest emergency department or a call to 911.   [JA]      ED Course User Index  [JA] Curtis Otto Jr., [facility]                       This chart is generated using a voice recognition system. Grammatical and content areas may inadvertently be generated in error.  Please contact me if you find a perceive any inappropriate information in this chart. Thank you.       Clinical Impression:  Final diagnoses:  [R05.9] Cough  [J06.9] Upper respiratory tract infection, unspecified type (Primary)          ED Disposition Condition    Discharge Stable          ED Prescriptions       Medication Sig Dispense Start Date End Date Auth. Provider    cetirizine (ZYRTEC) 10 MG tablet Take 1 tablet (10 mg total) by mouth once daily. for 14 days 14 tablet 6/17/2025 7/1/2025 Curtis Otto Jr., DNP    methylPREDNISolone (MEDROL DOSEPACK) 4 mg tablet use as directed 21 each 6/17/2025 -- Curtis Otto Jr., DNP    promethazine-dextromethorphan (PROMETHAZINE-DM) 6.25-15 mg/5 mL Syrp Take 5 mLs by mouth every 6 (six) hours as needed (cough). 100 mL 6/17/2025 6/22/2025 Curtis Otto Jr., DNP    albuterol (PROVENTIL/VENTOLIN HFA) 90 mcg/actuation inhaler Inhale 1-2 puffs into the lungs every 6 (six) hours as needed for Wheezing. Rescue 8 g 6/17/2025 -- Curtis Otto Jr., DNP          Follow-up Information       Follow up With Specialties Details Why Contact Info    Mena Rodriguez NP Urgent Care, Emergency Medicine, Family Medicine In 3 days  [address] [facility] [address] [phone]      [facility] Emergency Medicine In 3 days As needed, If symptoms worsen [address] [facility] [address] [phone]                   [1]   Social History  Tobacco Use    Smoking status: Never     Passive exposure: Never    Smokeless tobacco: Never   Substance Use Topics    Alcohol use: Yes     Alcohol/week: 2.0 standard drinks of alcohol     Types: 2 Glasses of wine per week     Comment: occasional    Drug use: Yes     Types: Marijuana     Comment: Curtis Ayala Jr., DNP  06/17/25 6835

## 2025-06-17 NOTE — Clinical Note
"Regina Yi (Bre)omon was seen and treated in our emergency department on 6/17/2025.  She may return to work on 06/20/2025.       If you have any questions or concerns, please don't hesitate to call.      Tonny KENNY    "

## 2025-06-17 NOTE — DISCHARGE INSTRUCTIONS
Follow up with your primary care physician in 3-5 days for follow up evaluation.  Take medication as prescribed.  Avoid smoke exposure or smoking.  Increase oral fluid intake.

## 2025-06-29 ENCOUNTER — OFFICE VISIT (OUTPATIENT)
Dept: URGENT CARE | Facility: CLINIC | Age: 30
End: 2025-06-29
Payer: COMMERCIAL

## 2025-06-29 VITALS
DIASTOLIC BLOOD PRESSURE: 64 MMHG | RESPIRATION RATE: 18 BRPM | TEMPERATURE: 99 F | SYSTOLIC BLOOD PRESSURE: 110 MMHG | BODY MASS INDEX: 24.96 KG/M2 | HEART RATE: 99 BPM | OXYGEN SATURATION: 98 % | WEIGHT: 159 LBS | HEIGHT: 67 IN

## 2025-06-29 DIAGNOSIS — R11.2 NAUSEA AND VOMITING, UNSPECIFIED VOMITING TYPE: ICD-10-CM

## 2025-06-29 DIAGNOSIS — R10.9 ABDOMINAL PAIN, UNSPECIFIED ABDOMINAL LOCATION: ICD-10-CM

## 2025-06-29 DIAGNOSIS — K52.9 GASTROENTERITIS: Primary | ICD-10-CM

## 2025-06-29 LAB
BILIRUB UR QL STRIP: NEGATIVE
GLUCOSE UR QL STRIP: NEGATIVE
KETONES UR QL STRIP: NEGATIVE
LEUKOCYTE ESTERASE UR QL STRIP: NEGATIVE
PH, POC UA: 7
POC BLOOD, URINE: POSITIVE
POC NITRATES, URINE: NEGATIVE
PROT UR QL STRIP: NEGATIVE
SP GR UR STRIP: 1.02 (ref 1–1.03)
UROBILINOGEN UR STRIP-ACNC: ABNORMAL (ref 0.1–1.1)

## 2025-06-29 PROCEDURE — 81003 URINALYSIS AUTO W/O SCOPE: CPT | Mod: PBBFAC

## 2025-06-29 PROCEDURE — 99214 OFFICE O/P EST MOD 30 MIN: CPT | Mod: S$PBB,,,

## 2025-06-29 PROCEDURE — 99214 OFFICE O/P EST MOD 30 MIN: CPT | Mod: PBBFAC

## 2025-06-29 RX ORDER — DICYCLOMINE HYDROCHLORIDE 10 MG/1
10 CAPSULE ORAL 3 TIMES DAILY
Qty: 15 CAPSULE | Refills: 0 | Status: SHIPPED | OUTPATIENT
Start: 2025-06-29 | End: 2025-07-04

## 2025-06-29 RX ORDER — ONDANSETRON 8 MG/1
8 TABLET, ORALLY DISINTEGRATING ORAL EVERY 6 HOURS PRN
Qty: 10 TABLET | Refills: 1 | Status: SHIPPED | OUTPATIENT
Start: 2025-06-29

## 2025-06-29 RX ORDER — PROMETHAZINE HYDROCHLORIDE 25 MG/1
25 TABLET ORAL EVERY 6 HOURS PRN
Qty: 15 TABLET | Refills: 0 | Status: SHIPPED | OUTPATIENT
Start: 2025-06-29

## 2025-06-29 NOTE — PATIENT INSTRUCTIONS
Increase fluid intake and monitor for signs of dehydration including dark colored urine, weakness, lethargy, dizziness, etc.   Get plenty of rest.   BRAT Diet: Begin eating a BRAT diet as tolerated (bananas, plain rice, apple sauce, plain toast).  Fever / Body Aches: Take OTC Tylenol or Motrin per package instructions as needed.   Diarrhea: Take OTC Imodium per package instructions as needed for non-bloody diarrhea.   Follow-up with your Primary Care Provider as needed.   Present to the nearest Emergency Department with any significant change or worsening symptoms.

## 2025-06-29 NOTE — LETTER
June 29, 2025      [facility]  [facility] [facility] [address]  Phone: [phone]       Patient: Regina Horton   YOB: 1995  Date of Visit: 06/29/2025    To Whom It May Concern:    Aline Horton  was at [facility] on 06/29/2025. The patient may return to work/school on 07/01/2025 with no restrictions. If you have any questions or concerns, or if I can be of further assistance, please do not hesitate to contact me.    Sincerely,    ROGER Cline

## 2025-06-29 NOTE — PROGRESS NOTES
"Subjective:       Patient ID: Regina Horton is a 29 y.o. female.    Vitals:  height is 5' 7" (1.702 m) and weight is 72.1 kg (159 lb). Her oral temperature is 98.9 °F (37.2 °C). Her blood pressure is 110/64 and her pulse is 99. Her respiration is 18 and oxygen saturation is 98%.     Chief Complaint: Nausea (Nausea and vomiting. Started 2-3 days ago. No fever noted. No diarrhea.Abdominal cramping. )    29-year-old female reports to the clinic with complaints of nausea and vomiting that began 3 days ago.  Patient denies fever, diarrhea, abdominal pain, , headache, chills, fatigue, body aches.  Patient states she is having some epigastric abdominal pain as well as intermittent abdominal cramping.  Patient states she was at work when symptoms started and had to leave work because of the vomiting and she ate crawfish the night before symptoms began.    All other systems are negative    Chart reviewed    Objective:   Physical Exam   Constitutional: She appears well-developed.  Non-toxic appearance. She does not appear ill. No distress.   Cardiovascular: Regular rhythm.   Pulmonary/Chest: Effort normal and breath sounds normal.   Abdominal: Bowel sounds are normal. She exhibits no distension. Soft. flat abdomen There is abdominal tenderness in the epigastric area. There is no rebound, no guarding, no tenderness at McBurney's point, negative Perez's sign, no left CVA tenderness, negative Rovsing's sign and no right CVA tenderness.   Musculoskeletal: Normal range of motion.         General: Normal range of motion.   Skin: Skin is warm, dry and not diaphoretic.   Nursing note and vitals reviewed.      Assessment:     1. Gastroenteritis    2. Abdominal pain, unspecified abdominal location    3. Nausea and vomiting, unspecified vomiting type        Results for orders placed or performed in visit on 06/29/25   POCT Urinalysis, Dipstick, Automated, W/O Scope    Collection Time: 06/29/25 11:30 AM   Result Value Ref Range    POC " Blood, Urine Positive (A) Negative    POC Bilirubin, Urine Negative Negative    POC Urobilinogen, Urine 0.2 E.U./dl 0.1 - 1.1    POC Ketones, Urine Negative Negative    POC Protein, Urine Negative Negative    POC Nitrates, Urine Negative Negative    POC Glucose, Urine Negative Negative    pH, UA 7.0     POC Specific Gravity, Urine 1.020 1.003 - 1.029    POC Leukocytes, Urine Negative Negative         Plan:   Discussed urinalysis results with patient  Begin taking Bentyl as needed for abdominal cramping  Begin taking Zofran and Phenergan as needed for nausea and vomiting, do not take these 2 medications together and medication should be taken at least 6 hours apart  Discussed precautions regarding Phenergan such as not driving, working, or operating machinery on this medication as it can cause sedation  Increase fluid intake and monitor for signs of dehydration including dark colored urine, weakness, lethargy, dizziness, etc.   Get plenty of rest.   BRAT Diet: Begin eating a BRAT diet as tolerated (bananas, plain rice, apple sauce, plain toast).  Fever / Body Aches: Take OTC Tylenol or Motrin per package instructions as needed.   Diarrhea: Take OTC Imodium per package instructions as needed for non-bloody diarrhea.   Follow-up with your Primary Care Provider as needed.   Present to the nearest Emergency Department with any significant change or worsening symptoms.     Gastroenteritis  -     dicyclomine (BENTYL) 10 MG capsule; Take 1 capsule (10 mg total) by mouth 3 (three) times daily. for 15 doses  Dispense: 15 capsule; Refill: 0  -     ondansetron (ZOFRAN-ODT) 8 MG TbDL; Take 1 tablet (8 mg total) by mouth every 6 (six) hours as needed (Nausea/vomiting).  Dispense: 10 tablet; Refill: 1  -     promethazine (PHENERGAN) 25 MG tablet; Take 1 tablet (25 mg total) by mouth every 6 (six) hours as needed for Nausea.  Dispense: 15 tablet; Refill: 0    Abdominal pain, unspecified abdominal location  -     POCT Urinalysis,  Dipstick, Automated, W/O Scope    Nausea and vomiting, unspecified vomiting type        Please note: This chart was completed via voice to text dictation. It may contain typographical/word recognition errors. If there are any questions, please contact the provider for final clarification.

## 2025-07-06 ENCOUNTER — HOSPITAL ENCOUNTER (OUTPATIENT)
Dept: RADIOLOGY | Facility: HOSPITAL | Age: 30
Discharge: HOME | End: 2025-07-06
Payer: COMMERCIAL

## 2025-07-06 ENCOUNTER — OFFICE VISIT (OUTPATIENT)
Dept: URGENT CARE | Facility: CLINIC | Age: 30
End: 2025-07-06
Payer: COMMERCIAL

## 2025-07-06 VITALS
HEART RATE: 86 BPM | HEIGHT: 67 IN | TEMPERATURE: 98 F | SYSTOLIC BLOOD PRESSURE: 109 MMHG | DIASTOLIC BLOOD PRESSURE: 72 MMHG | BODY MASS INDEX: 25.03 KG/M2 | OXYGEN SATURATION: 99 % | WEIGHT: 159.5 LBS | RESPIRATION RATE: 20 BRPM

## 2025-07-06 DIAGNOSIS — R10.12 LUQ ABDOMINAL PAIN: ICD-10-CM

## 2025-07-06 DIAGNOSIS — K59.00 CONSTIPATION, UNSPECIFIED CONSTIPATION TYPE: Primary | ICD-10-CM

## 2025-07-06 PROCEDURE — 74019 RADEX ABDOMEN 2 VIEWS: CPT | Mod: TC

## 2025-07-06 PROCEDURE — 99215 OFFICE O/P EST HI 40 MIN: CPT | Mod: PBBFAC,25

## 2025-07-06 PROCEDURE — 99214 OFFICE O/P EST MOD 30 MIN: CPT | Mod: S$PBB,,,

## 2025-07-06 RX ORDER — SYRING-NEEDL,DISP,INSUL,0.3 ML 29 G X1/2"
SYRINGE, EMPTY DISPOSABLE MISCELLANEOUS
Qty: 296 ML | Refills: 0 | Status: SHIPPED | OUTPATIENT
Start: 2025-07-06

## 2025-07-06 RX ORDER — PROMETHAZINE HYDROCHLORIDE 25 MG/1
25 TABLET ORAL EVERY 6 HOURS PRN
Qty: 20 TABLET | Refills: 0 | Status: SHIPPED | OUTPATIENT
Start: 2025-07-06

## 2025-07-06 NOTE — LETTER
July 6, 2025      [facility]  [facility] [facility] [address]  Phone: [phone]       Patient: Regina Horton   YOB: 1995  Date of Visit: 07/06/2025    To Whom It May Concern:    Aline Horton  was at [facility] on 07/06/2025. The patient may return to work/school on 07/07/2025 with no restrictions. If you have any questions or concerns, or if I can be of further assistance, please do not hesitate to contact me.    Sincerely,      ROGER Byrd

## 2025-07-06 NOTE — PROGRESS NOTES
"Subjective:       Patient ID: Regina Horton is a 29 y.o. female.    Vitals:  height is 5' 7" (1.702 m) and weight is 72.3 kg (159 lb 8 oz). Her oral temperature is 98.3 °F (36.8 °C). Her blood pressure is 109/72 and her pulse is 86. Her respiration is 20 and oxygen saturation is 99%.     Chief Complaint: Nausea (Pt co nausea, vomiting and abdominal cramping  x 2 weeks )    28 y/o AAF c/o LUQ pain and cramping x 2 weeks, was Rx Bentyl, she has not taken r/t "side effects." Admits to hard stool and constipation with persistent nausea. No LMP recorded (lmp unknown). (Menstrual status: Birth Control).      Nausea  Associated symptoms include abdominal pain and nausea. Pertinent negatives include no fever or vomiting.       Constitution: Negative for fever.   Gastrointestinal:  Positive for abdominal pain, history of abdominal surgery (hernia repair), nausea and constipation. Negative for abdominal trauma, vomiting, diarrhea, bright red blood in stool, dark colored stools, rectal bleeding, rectal pain, hemorrhoids, heartburn and bowel incontinence.   Genitourinary:  Negative for dysuria and frequency.       Objective:      Physical Exam   Constitutional: She is oriented to person, place, and time. She is cooperative. She is easily aroused.      Comments:Appears uncomfortable    awake  HENT:   Head: Normocephalic and atraumatic.   Pulmonary/Chest: Effort normal and breath sounds normal.   Abdominal: Soft. Bowel sounds are increased. flat abdomen There is abdominal tenderness in the left upper quadrant.   Neurological: She is alert, oriented to person, place, and time and easily aroused. GCS eye subscore is 4. GCS verbal subscore is 5. GCS motor subscore is 6.   Skin: Skin is warm, dry and intact. Capillary refill takes less than 2 seconds.   Psychiatric: Her speech is normal and behavior is normal.   Nursing note and vitals reviewed.        Assessment:       1. Constipation, unspecified constipation type    2. LUQ " abdominal pain          Plan:     X-ray findings consistent with moderate stool and nonobstructive gas, if radiologist detects any additional abnormalities, staff will notify patient.  Encouraged to increase water intake, high-fiber diet, follow up with PCP if symptoms does not improve with prescriptions.    Constipation, unspecified constipation type    LUQ abdominal pain  -     XR ABDOMEN FLAT AND ERECT; Future; Expected date: 07/06/2025  -     promethazine (PHENERGAN) 25 MG tablet; Take 1 tablet (25 mg total) by mouth every 6 (six) hours as needed for Nausea.  Dispense: 20 tablet; Refill: 0  -     magnesium citrate solution; Take 1/2 bottle initial , take second half in 12 hours if BM is not producible.  Dispense: 296 mL; Refill: 0           Results for orders placed or performed in visit on 06/29/25   POCT Urinalysis, Dipstick, Automated, W/O Scope    Collection Time: 06/29/25 11:30 AM   Result Value Ref Range    POC Blood, Urine Positive (A) Negative    POC Bilirubin, Urine Negative Negative    POC Urobilinogen, Urine 0.2 E.U./dl 0.1 - 1.1    POC Ketones, Urine Negative Negative    POC Protein, Urine Negative Negative    POC Nitrates, Urine Negative Negative    POC Glucose, Urine Negative Negative    pH, UA 7.0     POC Specific Gravity, Urine 1.020 1.003 - 1.029    POC Leukocytes, Urine Negative Negative        Medical Decision Making:   Clinical Tests:   Radiological Study: Ordered and Reviewed

## 2025-07-19 ENCOUNTER — HOSPITAL ENCOUNTER (OUTPATIENT)
Dept: RADIOLOGY | Facility: HOSPITAL | Age: 30
Discharge: HOME | End: 2025-07-19
Attending: NURSE PRACTITIONER
Payer: COMMERCIAL

## 2025-07-19 ENCOUNTER — OFFICE VISIT (OUTPATIENT)
Dept: URGENT CARE | Facility: CLINIC | Age: 30
End: 2025-07-19
Payer: COMMERCIAL

## 2025-07-19 VITALS
SYSTOLIC BLOOD PRESSURE: 94 MMHG | DIASTOLIC BLOOD PRESSURE: 64 MMHG | RESPIRATION RATE: 20 BRPM | WEIGHT: 157 LBS | HEIGHT: 67 IN | OXYGEN SATURATION: 100 % | BODY MASS INDEX: 24.64 KG/M2 | TEMPERATURE: 98 F | HEART RATE: 69 BPM

## 2025-07-19 DIAGNOSIS — R07.89 RIGHT-SIDED CHEST WALL PAIN: Primary | ICD-10-CM

## 2025-07-19 DIAGNOSIS — R07.89 RIGHT-SIDED CHEST WALL PAIN: ICD-10-CM

## 2025-07-19 PROCEDURE — 99213 OFFICE O/P EST LOW 20 MIN: CPT | Mod: S$PBB,,, | Performed by: NURSE PRACTITIONER

## 2025-07-19 PROCEDURE — 71046 X-RAY EXAM CHEST 2 VIEWS: CPT | Mod: TC

## 2025-07-19 PROCEDURE — 99215 OFFICE O/P EST HI 40 MIN: CPT | Mod: PBBFAC,25 | Performed by: NURSE PRACTITIONER

## 2025-07-19 RX ORDER — DICLOFENAC SODIUM 75 MG/1
75 TABLET, DELAYED RELEASE ORAL 2 TIMES DAILY
COMMUNITY

## 2025-07-19 RX ORDER — CYCLOBENZAPRINE HCL 10 MG
10 TABLET ORAL 3 TIMES DAILY PRN
COMMUNITY

## 2025-07-19 NOTE — PATIENT INSTRUCTIONS
Take cyclobenazprine, methylprednisolone and diclofenac as prescribed on 7/14/2024.   Take diclofenac with food and drink plenty of water. Do not take diclofenac with other NSAIDs (Ibiuprofen, Motrin, Aleve, BC powder, Naproxen).   Follow-up with primary care provider in 3-5 days for follow-up.   Go to ED for worsening of symptoms.

## 2025-07-19 NOTE — PROGRESS NOTES
"  Subjective:      Patient ID: Regina Horton is a 29 y.o. female.    Vitals:  height is 5' 7" (1.702 m) and weight is 71.2 kg (157 lb). Her oral temperature is 98.4 °F (36.9 °C). Her blood pressure is 94/64 and her pulse is 69. Her respiration is 20 and oxygen saturation is 100%.     Chief Complaint: Chest Pain (Right sided chest pain, worse when taking a deep breath. Patient reports having a pulled muscle.)    29 year old female presents to [facility] with complaints of muscle strain to right chest wall for the past 2 weeks following a "bad sinus infection". She can reproduce the pain when she presses on the right side of her chest. She also feels the pain when taking a deep breath in. She describes the pain as a "soreness". 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 She did not go to work today due to the muscle strain. She denies cough and difficulty breathing. She was seen at [facility] on 7/14/25 with the same compliant. At that time  she was prescribed Medrol dose pack, cyclobenzaprine and diclofenac. She says that she is taking these medications but when being checked in by staff, she denied taking these medications.  Of note, she was also seen on 7/7/25 and 7/11/25 at [facility] for the same compliant.     Chest Pain   Pertinent negatives include no abdominal pain, cough, dizziness, fever, headaches, nausea, palpitations, shortness of breath, syncope or vomiting.       Constitution: Negative for activity change, appetite change and fever.   HENT:  Negative for ear pain, congestion and sore throat.    Cardiovascular:  Positive for chest pain (Right chest wall). Negative for palpitations, sob on exertion and passing out.   Respiratory:  Negative for cough, shortness of breath and wheezing.    Gastrointestinal:  Negative for abdominal pain, nausea, vomiting, constipation and diarrhea.   Genitourinary: Negative.  Negative for urine decreased. "   Musculoskeletal:  Negative for pain and trauma.   Skin:  Negative for rash.   Neurological:  Negative for dizziness and headaches.      Objective:     Physical Exam   Constitutional: She is oriented to person, place, and time.  Non-toxic appearance. No distress.   HENT:   Head: Normocephalic and atraumatic.   Ears:   Right Ear: Tympanic membrane and ear canal normal.   Left Ear: Tympanic membrane and ear canal normal.   Nose: Nose normal. No rhinorrhea or congestion.   Mouth/Throat: Mucous membranes are moist. No oropharyngeal exudate or posterior oropharyngeal erythema. Oropharynx is clear.   Eyes: Pupils are equal, round, and reactive to light.   Neck: Neck supple.   Cardiovascular: Normal rate, regular rhythm and normal pulses.   No murmur heard.  Pulmonary/Chest: Effort normal and breath sounds normal. No respiratory distress. She exhibits tenderness. She exhibits no mass, no crepitus, no edema, no deformity, no swelling and no retraction.   Reproducible pain over right upper chest wall on palpation.         Comments: Reproducible pain over right upper chest wall on palpation.    Abdominal: Normal appearance and bowel sounds are normal. She exhibits no distension. Soft. There is no abdominal tenderness.   Musculoskeletal: Normal range of motion.         General: Normal range of motion.      Cervical back: She exhibits no tenderness.   Lymphadenopathy:     She has no cervical adenopathy.   Neurological: no focal deficit. She is alert and oriented to person, place, and time.   Skin: Skin is warm and dry. Capillary refill takes less than 2 seconds.   Nursing note and vitals reviewed.      Assessment:     1. Right-sided chest wall pain        Plan:       Right-sided chest wall pain  -     XR CHEST PA AND LATERAL; Future; Expected date: 07/19/2025    Normal chest x-ray today. Also reviewed abdominal and chest x-rays from 7/6/25 and 7/7/25.    Take cyclobenazprine, methylprednisolone and diclofenac as prescribed on  2024.   Take diclofenac with food and drink plenty of water. Do not take diclofenac with other NSAIDs (Ibiuprofen, Motrin, Aleve, BC powder, Naproxen).   Follow-up with primary care provider in 3-5 days for follow-up.   Go to ED for worsening of symptoms.     XR CHEST PA AND LATERAL  Result Date: 2025  EXAMINATION: XR CHEST PA AND LATERAL CLINICAL HISTORY: Other chest pain COMPARISON: 2025 FINDINGS: PA and lateral views of the chest were obtained. Heart and mediastinum within normal limits. The lungs are clear. No pneumothorax or significant effusion.     No acute cardiopulmonary abnormality. Electronically signed by: Curtsi Camara Date:    2025 Time:    14:50    X-Ray Chest PA And Lateral   Patient Name: MADELAINE WILLIAMSON  :  [birth year]  Patient MRN: [id number]  Gender:  F  Study Date: 2025, 9:34:50 AM CDT  Accession:  [id number]  Description: CHEST  Ref Phys:  ROSA ORTA  Number of Views:  2    Examination Description: Chest xray, frontal and lateral views Comparisons: None provided.   Findings The cardiomediastinal silhouette is unremarkable. The lungs are clear. No consolidation is present. No pleural effusion is noted. There is no pneumothorax present. The soft tissues and bones are unremarkable for age. IMPRESSION: Negative examination for age. No consolidation. Electronically signed on 2025, 9:41:09 AM CDT by: Dov Owens MD [facility]    XR ABDOMEN FLAT AND ERECT  Result Date: 2025  EXAMINATION: XR ABDOMEN FLAT AND ERECT CLINICAL HISTORY: Left upper quadrant pain TECHNIQUE: Two views COMPARISON: 2020 FINDINGS: The intestinal gas pattern is nonspecific and nonobstructive. No air fluid levels or pneumoperitoneum identified.  Visualized portion of the lungs are clear.     Nonspecific bowel gas pattern. Electronically signed by: Kelvin Mace Date:    2025 Time:    12:34

## 2025-07-27 ENCOUNTER — HOSPITAL ENCOUNTER (EMERGENCY)
Facility: HOSPITAL | Age: 30
Discharge: HOME | End: 2025-07-27
Attending: EMERGENCY MEDICINE
Payer: COMMERCIAL

## 2025-07-27 VITALS
HEIGHT: 66 IN | RESPIRATION RATE: 18 BRPM | BODY MASS INDEX: 25.21 KG/M2 | WEIGHT: 156.88 LBS | OXYGEN SATURATION: 100 % | SYSTOLIC BLOOD PRESSURE: 106 MMHG | TEMPERATURE: 98 F | HEART RATE: 67 BPM | DIASTOLIC BLOOD PRESSURE: 68 MMHG

## 2025-07-27 DIAGNOSIS — R11.0 NAUSEA: ICD-10-CM

## 2025-07-27 DIAGNOSIS — R10.31 RIGHT LOWER QUADRANT ABDOMINAL PAIN: Primary | ICD-10-CM

## 2025-07-27 LAB
ALBUMIN SERPL-MCNC: 3.8 G/DL (ref 3.5–5)
ALBUMIN/GLOB SERPL: 1.2 RATIO (ref 1.1–2)
ALP SERPL-CCNC: 61 UNIT/L (ref 40–150)
ALT SERPL-CCNC: 7 UNIT/L (ref 0–55)
ANION GAP SERPL CALC-SCNC: 4 MEQ/L
AST SERPL-CCNC: 10 UNIT/L (ref 11–45)
B-HCG UR QL: NEGATIVE
BASOPHILS # BLD AUTO: 0.03 X10(3)/MCL
BASOPHILS NFR BLD AUTO: 0.5 %
BILIRUB SERPL-MCNC: 0.3 MG/DL
BUN SERPL-MCNC: 8.2 MG/DL (ref 7–18.7)
CALCIUM SERPL-MCNC: 9 MG/DL (ref 8.4–10.2)
CHLORIDE SERPL-SCNC: 110 MMOL/L (ref 98–107)
CO2 SERPL-SCNC: 25 MMOL/L (ref 22–29)
CREAT SERPL-MCNC: 1.03 MG/DL (ref 0.55–1.02)
CREAT/UREA NIT SERPL: 8
CTP QC/QA: YES
EOSINOPHIL # BLD AUTO: 0.09 X10(3)/MCL (ref 0–0.9)
EOSINOPHIL NFR BLD AUTO: 1.5 %
ERYTHROCYTE [DISTWIDTH] IN BLOOD BY AUTOMATED COUNT: 11.8 % (ref 11.5–17)
GFR SERPLBLD CREATININE-BSD FMLA CKD-EPI: >60 ML/MIN/1.73/M2
GLOBULIN SER-MCNC: 3.3 GM/DL (ref 2.4–3.5)
GLUCOSE SERPL-MCNC: 76 MG/DL (ref 74–100)
HCT VFR BLD AUTO: 42.7 % (ref 37–47)
HGB BLD-MCNC: 14.1 G/DL (ref 12–16)
HOLD SPECIMEN: NORMAL
HOLD SPECIMEN: NORMAL
IMM GRANULOCYTES # BLD AUTO: 0.02 X10(3)/MCL (ref 0–0.04)
IMM GRANULOCYTES NFR BLD AUTO: 0.3 %
LIPASE SERPL-CCNC: 15 U/L
LYMPHOCYTES # BLD AUTO: 2.78 X10(3)/MCL (ref 0.6–4.6)
LYMPHOCYTES NFR BLD AUTO: 45.2 %
MCH RBC QN AUTO: 31.2 PG (ref 27–31)
MCHC RBC AUTO-ENTMCNC: 33 G/DL (ref 33–36)
MCV RBC AUTO: 94.5 FL (ref 80–94)
MONOCYTES # BLD AUTO: 0.69 X10(3)/MCL (ref 0.1–1.3)
MONOCYTES NFR BLD AUTO: 11.2 %
NEUTROPHILS # BLD AUTO: 2.54 X10(3)/MCL (ref 2.1–9.2)
NEUTROPHILS NFR BLD AUTO: 41.3 %
NRBC BLD AUTO-RTO: 0 %
PLATELET # BLD AUTO: 184 X10(3)/MCL (ref 130–400)
PMV BLD AUTO: 10.4 FL (ref 7.4–10.4)
POTASSIUM SERPL-SCNC: 3.9 MMOL/L (ref 3.5–5.1)
PROT SERPL-MCNC: 7.1 GM/DL (ref 6.4–8.3)
RBC # BLD AUTO: 4.52 X10(6)/MCL (ref 4.2–5.4)
SODIUM SERPL-SCNC: 139 MMOL/L (ref 136–145)
WBC # BLD AUTO: 6.15 X10(3)/MCL (ref 4.5–11.5)

## 2025-07-27 PROCEDURE — 80053 COMPREHEN METABOLIC PANEL: CPT

## 2025-07-27 PROCEDURE — 83690 ASSAY OF LIPASE: CPT

## 2025-07-27 PROCEDURE — 63600175 PHARM REV CODE 636 W HCPCS

## 2025-07-27 PROCEDURE — 85025 COMPLETE CBC W/AUTO DIFF WBC: CPT

## 2025-07-27 PROCEDURE — 99285 EMERGENCY DEPT VISIT HI MDM: CPT | Mod: 25

## 2025-07-27 PROCEDURE — 25500020 PHARM REV CODE 255

## 2025-07-27 PROCEDURE — 81025 URINE PREGNANCY TEST: CPT

## 2025-07-27 PROCEDURE — 96361 HYDRATE IV INFUSION ADD-ON: CPT

## 2025-07-27 PROCEDURE — 96360 HYDRATION IV INFUSION INIT: CPT

## 2025-07-27 RX ORDER — ONDANSETRON 4 MG/1
4 TABLET, ORALLY DISINTEGRATING ORAL EVERY 6 HOURS PRN
Qty: 12 TABLET | Refills: 0 | Status: SHIPPED | OUTPATIENT
Start: 2025-07-27

## 2025-07-27 RX ADMIN — IOHEXOL 100 ML: 350 INJECTION, SOLUTION INTRAVENOUS at 05:07

## 2025-07-27 RX ADMIN — SODIUM CHLORIDE, POTASSIUM CHLORIDE, SODIUM LACTATE AND CALCIUM CHLORIDE 1000 ML: 600; 310; 30; 20 INJECTION, SOLUTION INTRAVENOUS at 04:07

## 2025-07-27 NOTE — Clinical Note
"Regina Yi (Bre)omon was seen and treated in our emergency department on 7/27/2025.  She may return to work on 07/28/2025.       If you have any questions or concerns, please don't hesitate to call.      ABRAHAM Grimes RN    "

## 2025-07-27 NOTE — ED PROVIDER NOTES
Encounter Date: 7/27/2025       History     Chief Complaint   Patient presents with    Abdominal Pain     Abd pain and intermittent nausea x2 days.      29-year-old female who presents with complaints of right lower quadrant abdominal pain as well as nausea and vomiting onset Thursday.  She reports a total of 4 episodes of watery emesis since onset.  Denies blood.  Denies fever, chills, body aches, chest pain, shortness of breath, diarrhea, urinary changes, any other complaints.    The history is provided by the patient.     Review of patient's allergies indicates:   Allergen Reactions    Amoxicillin Dermatitis     Other reaction(s): Rash    Penicillin      Past Medical History:   Diagnosis Date    Abnormal Pap smear of cervix      Past Surgical History:   Procedure Laterality Date    CHROMOTUBATION OF FALLOPIAN TUBE Bilateral 4/11/2024    Procedure: CHROMOTUBATION, OVIDUCT;  Surgeon: Tammy Saunders MD;  Location: [facility];  Service: OB/GYN;  Laterality: Bilateral;    CONIZATION OF CERVIX USING LOOP ELECTROSURGICAL EXCISION PROCEDURE (LEEP) N/A 4/11/2024    Procedure: LEEP CONIZATION, CERVIX;  Surgeon: Tammy Saunders MD;  Location: [facility];  Service: OB/GYN;  Laterality: N/A;    CYST REMOVAL      On right foot    CYSTOSCOPY N/A 4/11/2024    Procedure: CYSTOSCOPY;  Surgeon: Tammy Saunders MD;  Location: [facility];  Service: OB/GYN;  Laterality: N/A;    DIAGNOSTIC LAPAROSCOPY N/A 4/11/2024    Procedure: LAPAROSCOPY, DIAGNOSTIC;  Surgeon: Tammy Saunders MD;  Location: [facility];  Service: OB/GYN;  Laterality: N/A;  2 cameras, 2 light cords  Top & bottom @same time    HYSTEROSCOPY WITH DILATION AND CURETTAGE OF UTERUS N/A 4/11/2024    Procedure: HYSTEROSCOPY, WITH DILATION AND CURETTAGE OF UTERUS;  Surgeon: Tammy Saunders MD;  Location: [facility];  Service: OB/GYN;  Laterality: N/A;    REPAIR, HERNIA, UMBILICAL N/A 7/26/2024    Procedure: REPAIR, HERNIA, UMBILICAL;  Surgeon: Grant Wallace Jr., MD;  Location:  [facility];  Service: General;  Laterality: N/A;     Family History   Problem Relation Name Age of Onset    No Known Problems Mother      No Known Problems Father      Diabetes Maternal Aunt      Ovarian cancer Maternal Grandmother      Ovarian cancer Paternal Grandmother       Social History[1]  Review of Systems   Constitutional:  Negative for fever.   HENT:  Negative for sore throat.    Respiratory:  Negative for shortness of breath.    Cardiovascular:  Negative for chest pain.   Gastrointestinal:  Positive for abdominal pain, nausea and vomiting.   Genitourinary:  Negative for dysuria.   Musculoskeletal:  Negative for back pain.   Skin:  Negative for rash.   Neurological:  Negative for weakness.   Hematological:  Does not bruise/bleed easily.       Physical Exam     Initial Vitals [07/27/25 1514]   BP Pulse Resp Temp SpO2   (!) 110/56 69 18 98.4 °F (36.9 °C) 99 %      MAP       --         Physical Exam    Nursing note and vitals reviewed.  Constitutional: She appears well-developed and well-nourished. She is not diaphoretic. No distress.   HENT:   Head: Normocephalic and atraumatic.   Right Ear: External ear normal.   Left Ear: External ear normal.   Nose: Nose normal. Mouth/Throat: Oropharynx is clear and moist.   Eyes: Conjunctivae and EOM are normal. Pupils are equal, round, and reactive to light. Right eye exhibits no discharge. Left eye exhibits no discharge.   Neck: Neck supple.   Normal range of motion.  Cardiovascular:  Normal rate.           Pulmonary/Chest: Breath sounds normal. No respiratory distress. She has no wheezes.   Abdominal: Abdomen is soft. Bowel sounds are normal. She exhibits no distension and no mass. There is abdominal tenderness in the right lower quadrant.   No right CVA tenderness.  No left CVA tenderness. There is no rebound, no guarding, no tenderness at McBurney's point and negative Perez's sign. negative Rovsing's sign  Musculoskeletal:         General: Normal range of motion.       Cervical back: Normal range of motion and neck supple.     Neurological: She is alert and oriented to person, place, and time. No cranial nerve deficit or sensory deficit. GCS score is 15. GCS eye subscore is 4. GCS verbal subscore is 5. GCS motor subscore is 6.   Skin: Skin is warm. Capillary refill takes less than 2 seconds.   Psychiatric: She has a normal mood and affect. Thought content normal.         ED Course   Procedures  Labs Reviewed   COMPREHENSIVE METABOLIC PANEL - Abnormal       Result Value    Sodium 139      Potassium 3.9      Chloride 110 (*)     CO2 25      Glucose 76      Blood Urea Nitrogen 8.2      Creatinine 1.03 (*)     Calcium 9.0      Protein Total 7.1      Albumin 3.8      Globulin 3.3      Albumin/Globulin Ratio 1.2      Bilirubin Total 0.3      ALP 61      ALT 7      AST 10 (*)     eGFR >60      Anion Gap 4.0      BUN/Creatinine Ratio 8     CBC WITH DIFFERENTIAL - Abnormal    WBC 6.15      RBC 4.52      Hgb 14.1      Hct 42.7      MCV 94.5 (*)     MCH 31.2 (*)     MCHC 33.0      RDW 11.8      Platelet 184      MPV 10.4      Neut % 41.3      Lymph % 45.2      Mono % 11.2      Eos % 1.5      Basophil % 0.5      Imm Grans % 0.3      Neut # 2.54      Lymph # 2.78      Mono # 0.69      Eos # 0.09      Baso # 0.03      Imm Gran # 0.02      NRBC% 0.0     LIPASE - Normal    Lipase Level 15     CBC W/ AUTO DIFFERENTIAL    Narrative:     The following orders were created for panel order CBC auto differential.  Procedure                               Abnormality         Status                     ---------                               -----------         ------                     CBC with Differential[[id number]]       Abnormal            Final result                 Please view results for these tests on the individual orders.   EXTRA TUBES    Narrative:     The following orders were created for panel order EXTRA TUBES.  Procedure                               Abnormality         Status                      ---------                               -----------         ------                     Light Blue Top Hold[[id number]]                             Final result               Gold Top Hold[[id number]]                                   Final result                 Please view results for these tests on the individual orders.   LIGHT BLUE TOP HOLD    Extra Tube Hold for add-ons.     GOLD TOP HOLD    Extra Tube Hold for add-ons.     URINALYSIS, REFLEX TO URINE CULTURE   POCT URINE PREGNANCY    POC Preg Test, Ur Negative       Acceptable Yes            Imaging Results              CT Abdomen Pelvis With IV Contrast NO Oral Contrast (Final result)  Result time 07/27/25 17:12:50      Final result by Nelson Huerta MD (07/27/25 17:12:50)                   Impression:      Changes suspicious for SMA syndrome would recommend correlation for a history of weight loss.      Electronically signed by: Nelson Huerta MD  Date:    07/27/2025  Time:    17:12               Narrative:    EXAMINATION:  CT ABDOMEN PELVIS WITH IV CONTRAST    CLINICAL HISTORY:  RLQ abdominal pain (Age >= 14y);    TECHNIQUE:  Low dose axial images, sagittal and coronal reformations were obtained from the lung bases to the pubic symphysis following the IV administration of 100 mL of Omnipaque 350 no oral contrast was given.    Automatic exposure control (AEC) was utilized for dose reduction.    Dose: 230 mGycm    COMPARISON:  05/31/2024    FINDINGS:  There are mild atelectatic changes in the right lung base.  Liver appears normal.  Spleen appears normal.  Pancreas appears normal.  Biliary system appears normal.  The adrenals are not enlarged.  Kidneys appear normal.  Aorta shows no evidence of an aneurysm.  The appendix appears normal.  Uterus appears normal.  Small bowel is not significantly dilated.  No free air is noted.  There is compression of the 3rd portion of the duodenum underneath the SMA cannot rule out SMA syndrome.  This  is best seen on series 2, image 53                                       Medications   lactated ringers bolus 1,000 mL (0 mLs Intravenous Stopped 7/27/25 1808)   iohexoL (OMNIPAQUE 350) injection 100 mL (100 mLs Intravenous Given 7/27/25 1707)     Medical Decision Making  Differential diagnosis:   Appendicitis   IBS   Gastroenteritis    Amount and/or Complexity of Data Reviewed  Labs: ordered.  Radiology: ordered.    Risk  Prescription drug management.               ED Course as of 07/27/25 2152   Sun Jul 27, 2025   1811 Reviewed UPT and urinalysis from urgent care just prior to arrival to ED. negative for pregnancy.  Negative for UTI.  Records scanned into chart. [DB]   1813 Given strict ED return precautions. I have spoken with the patient and/or caregivers. I have explained the patient's condition, diagnoses and treatment plan based on the information available to me at this time. I have answered the patient's and/or caregiver's questions and addressed any concerns. The patient and/or caregivers have as good an understanding of the patient's diagnosis, condition and treatment plan as can be expected at this point. The vital signs have been stable. The patient's condition is stable and appropriate for discharge from the emergency department.      The patient will pursue further outpatient evaluation with the primary care physician or other designated or consulting physician as outlined in the discharge instructions. The patient and/or caregivers are agreeable to this plan of care and follow-up instructions have been explained in detail. The patient and/or caregivers have received these instructions in written format and have expressed an understanding of the discharge instructions. The patient and/or caregivers are aware that any significant change in condition or worsening of symptoms should prompt an immediate return to this or the closest emergency department or a call to 911.    [DB]      ED Course User  Index  [DB] Dhaval Rosas PA-C                               Clinical Impression:  Final diagnoses:  [R10.31] Right lower quadrant abdominal pain (Primary)  [R11.0] Nausea          ED Disposition Condition    Discharge Stable          ED Prescriptions       Medication Sig Dispense Start Date End Date Auth. Provider    ondansetron (ZOFRAN-ODT) 4 MG TbDL Take 1 tablet (4 mg total) by mouth every 6 (six) hours as needed. 12 tablet 7/27/2025 -- Dhaval Rosas PA-C          Follow-up Information       Follow up With Specialties Details Why Contact Info [facility] Emergency Medicine  If symptoms worsen [address] [facility] [address] [phone]    Mena Rodriguez NP Urgent Care, Emergency Medicine, Family Medicine Schedule an appointment as soon as possible for a visit   [address] [place] [id number]  [phone]                     [1]   Social History  Tobacco Use    Smoking status: Never     Passive exposure: Never    Smokeless tobacco: Never   Substance Use Topics    Alcohol use: Yes     Alcohol/week: 2.0 standard drinks of alcohol     Types: 2 Glasses of wine per week     Comment: occasional    Drug use: Not Currently     Types: Marijuana     Comment: occ        Dhaval Rosas PA-C  07/27/25 0045

## 2025-07-27 NOTE — Clinical Note
"Regina Yi (Bre)omon was seen and treated in our emergency department on 7/27/2025.  She may return to work on 07/29/2025.       If you have any questions or concerns, please don't hesitate to call.      ABRAHAM Grimes RN    "

## 2025-07-30 ENCOUNTER — TELEPHONE (OUTPATIENT)
Dept: GYNECOLOGY | Facility: CLINIC | Age: 30
End: 2025-07-30
Payer: COMMERCIAL

## 2025-07-30 NOTE — TELEPHONE ENCOUNTER
Can this be done for her or does she need to see y'all first for an exam?     I looked in media and see that she last saw [facility] pelvic floor therapy dept on 6/20/24. I also noticed that she has seen Dr. Cha twice this past year. Her notes are in Care Everywhere to review.     ----- Message from Erika sent at 7/30/2025  2:27 PM CDT -----  Regarding: Referral  The patient above is requesting a referral for Pelvic floor therapy. She is coming to clinic for an appointment tomorrow and would like to get a copy of it. Please advise, Thanks

## 2025-07-31 ENCOUNTER — CLINICAL SUPPORT (OUTPATIENT)
Dept: GYNECOLOGY | Facility: CLINIC | Age: 30
End: 2025-07-31
Payer: COMMERCIAL

## 2025-07-31 ENCOUNTER — PATIENT MESSAGE (OUTPATIENT)
Dept: GYNECOLOGY | Facility: CLINIC | Age: 30
End: 2025-07-31

## 2025-07-31 DIAGNOSIS — N93.9 ABNORMAL UTERINE BLEEDING (AUB): Primary | ICD-10-CM

## 2025-07-31 PROCEDURE — 99211 OFF/OP EST MAY X REQ PHY/QHP: CPT | Mod: PBBFAC

## 2025-07-31 PROCEDURE — 96372 THER/PROPH/DIAG INJ SC/IM: CPT | Mod: PBBFAC

## 2025-07-31 RX ORDER — MEDROXYPROGESTERONE ACETATE 150 MG/ML
150 INJECTION, SUSPENSION INTRAMUSCULAR
Status: COMPLETED | OUTPATIENT
Start: 2025-07-31 | End: 2025-07-31

## 2025-07-31 RX ADMIN — MEDROXYPROGESTERONE ACETATE 150 MG: 150 INJECTION, SUSPENSION, EXTENDED RELEASE INTRAMUSCULAR at 10:07

## 2025-07-31 NOTE — PROGRESS NOTES
Depo-Provera injection administered per Dr. Walton Lalita order. Given in RGM. Patient tolerated well and left in stable condition.

## (undated) DEVICE — GOWN POLY REINF BRTH SLV XL

## (undated) DEVICE — MANIFOLD 4 PORT

## (undated) DEVICE — MARKER WRITESITE SKIN CHLRAPRP

## (undated) DEVICE — STAPLER SKIN COUNT 35 W STPL

## (undated) DEVICE — SOL IRRI STRL WATER 1000ML

## (undated) DEVICE — APPLICATOR CHLORAPREP ORN 26ML

## (undated) DEVICE — ELECTRODE PATIENT RETURN DISP

## (undated) DEVICE — GLOVE SENSICARE PI GRN 6

## (undated) DEVICE — GLOVE SIGNATURE ESSNTL LTX 6.5

## (undated) DEVICE — TUBING MEDI-VAC 20FT .25IN

## (undated) DEVICE — KIT LAPAROSCOPY UNIVERSITY

## (undated) DEVICE — SOLIDIFIER FLUID PWD 1200CC

## (undated) DEVICE — SEAL LENS SCOPE MYOSURE

## (undated) DEVICE — GLOVE SENSICARE PI GRN 6.5

## (undated) DEVICE — CAP SELF SEAL GYNEOCLOGY F4

## (undated) DEVICE — HEMOSTAT SURGICEL 2X3IN

## (undated) DEVICE — KIT SURGICAL TURNOVER

## (undated) DEVICE — SUT 3-0 MONOCRYL PLUS PS-2

## (undated) DEVICE — PAD PREP CUFFED NS 24X48IN

## (undated) DEVICE — SLEEVE KII ADV FIX 5X100MM

## (undated) DEVICE — DRAPE ORTH SPLIT 77X108IN

## (undated) DEVICE — GLOVE SIGNATURE ESSNTL LTX 6

## (undated) DEVICE — DEVICE MYOSURE REACH SYS

## (undated) DEVICE — DRAPE UNDERBUTTOCKS PCH STRL

## (undated) DEVICE — SUT MCRYL PLUS 4-0 PS2 27IN

## (undated) DEVICE — YANKAUER FLEX NO VENT REG CAP

## (undated) DEVICE — SYR 10CC LUER LOCK

## (undated) DEVICE — SOL NORMAL USPCA 0.9%

## (undated) DEVICE — NDL HYPO REG 25G X 1 1/2

## (undated) DEVICE — TUBING INSUFFLATOR W/ROT CONCT

## (undated) DEVICE — TRAY SKIN SCRUB WET PREMIUM

## (undated) DEVICE — TRAY CATH FOL SIL URIMTR 16FR

## (undated) DEVICE — SUT 2/0 36IN COATED VICRYL

## (undated) DEVICE — IRRIGATOR SUCTION W/TIP

## (undated) DEVICE — GLOVE SENSICARE PI GRN 7.5

## (undated) DEVICE — PACK FLUENT DISPOSABLE

## (undated) DEVICE — NDL PNEUMO INSUFFLATI 120MM

## (undated) DEVICE — COVER MAYO STND XL 30X57IN

## (undated) DEVICE — TOWEL OR DISP STRL BLUE 4/PK

## (undated) DEVICE — Device

## (undated) DEVICE — SUT COATED VICRYL 4/0 27IN

## (undated) DEVICE — ELECTRODE BALL RED 5MM

## (undated) DEVICE — SOL NACL IRR 3000ML

## (undated) DEVICE — SPONGE LAP 18X18 PREWASHED

## (undated) DEVICE — GLOVE SIGNATURE ESSNTL LTX 7

## (undated) DEVICE — SPONGE GAUZE 16PLY 4X4

## (undated) DEVICE — GLOVE SENSICARE NEOPRENE 6.5

## (undated) DEVICE — POSITIONER HEAD SUPINE PINK

## (undated) DEVICE — PAD PINK TRENDELENBURG POS XL

## (undated) DEVICE — TROCAR KII FIOS 5MM X 100MM

## (undated) DEVICE — ELECTRODE LOOP 20MMX12MM

## (undated) DEVICE — CONTAINER SPECIMEN OR STER 4OZ

## (undated) DEVICE — BLADE SURG STAINLESS STEEL #11

## (undated) DEVICE — SUT ETHIBOND EXCEL 0 MO6 18

## (undated) DEVICE — SET CYSTO IRR DRP CHMBR 84IN

## (undated) DEVICE — UTERINE MANIPULATOR HUMI 6003

## (undated) DEVICE — SUT CTD VICRYL 4-0 BR PS-2

## (undated) DEVICE — ADHESIVE DERMABOND ADVANCED

## (undated) DEVICE — GLOVE SENSICARE PI GRN 8

## (undated) DEVICE — SYR 50CC LL

## (undated) DEVICE — JELLY SURGILUBE LUBE PKT 3GM

## (undated) DEVICE — PAD CURAD NONADH 3X4IN

## (undated) DEVICE — SUT VICRYL+ 2-0 SH 18IN

## (undated) DEVICE — SOLIDIFIER BOTTLE 1500CC

## (undated) DEVICE — SOL NACL IRR 1000ML BTL

## (undated) DEVICE — GLOVE SIGNATURE ESSNTL LTX 8

## (undated) DEVICE — SOL 9P NACL IRR PIC IL

## (undated) DEVICE — SUT 2/0 30IN SILK BLK BRAI

## (undated) DEVICE — PACK DRAPE PERI/GYN TIBURON

## (undated) DEVICE — BLADE TONGUE DEPRESSOR STRL

## (undated) DEVICE — SLEEVE STEP SHORT

## (undated) DEVICE — CLIPPER BLADE MOD 4406 (CAREF)

## (undated) DEVICE — DRAPE LEGGINGS CUFF 31X48IN

## (undated) DEVICE — PROTECTOR ONE-STEP ARM REG